# Patient Record
Sex: FEMALE | Race: WHITE | NOT HISPANIC OR LATINO | Employment: FULL TIME | URBAN - METROPOLITAN AREA
[De-identification: names, ages, dates, MRNs, and addresses within clinical notes are randomized per-mention and may not be internally consistent; named-entity substitution may affect disease eponyms.]

---

## 2017-02-01 ENCOUNTER — ALLSCRIPTS OFFICE VISIT (OUTPATIENT)
Dept: OTHER | Facility: OTHER | Age: 47
End: 2017-02-01

## 2017-02-01 DIAGNOSIS — Z12.31 ENCOUNTER FOR SCREENING MAMMOGRAM FOR MALIGNANT NEOPLASM OF BREAST: ICD-10-CM

## 2017-03-06 DIAGNOSIS — Z13.1 ENCOUNTER FOR SCREENING FOR DIABETES MELLITUS: ICD-10-CM

## 2017-03-06 DIAGNOSIS — Z13.0 ENCOUNTER FOR SCREENING FOR DISEASES OF THE BLOOD AND BLOOD-FORMING ORGANS AND CERTAIN DISORDERS INVOLVING THE IMMUNE MECHANISM: ICD-10-CM

## 2017-03-06 DIAGNOSIS — Z13.29 ENCOUNTER FOR SCREENING FOR OTHER SUSPECTED ENDOCRINE DISORDER: ICD-10-CM

## 2017-03-06 DIAGNOSIS — Z13.220 ENCOUNTER FOR SCREENING FOR LIPOID DISORDERS: ICD-10-CM

## 2017-04-24 ENCOUNTER — ALLSCRIPTS OFFICE VISIT (OUTPATIENT)
Dept: OTHER | Facility: OTHER | Age: 47
End: 2017-04-24

## 2017-05-03 ENCOUNTER — TRANSCRIBE ORDERS (OUTPATIENT)
Dept: URGENT CARE | Facility: MEDICAL CENTER | Age: 47
End: 2017-05-03

## 2017-05-03 ENCOUNTER — OFFICE VISIT (OUTPATIENT)
Dept: URGENT CARE | Facility: MEDICAL CENTER | Age: 47
End: 2017-05-03
Payer: COMMERCIAL

## 2017-05-03 ENCOUNTER — APPOINTMENT (OUTPATIENT)
Dept: LAB | Facility: MEDICAL CENTER | Age: 47
End: 2017-05-03
Attending: FAMILY MEDICINE
Payer: COMMERCIAL

## 2017-05-03 DIAGNOSIS — Z02.1 PRE-EMPLOYMENT HEALTH SCREENING EXAMINATION: ICD-10-CM

## 2017-05-03 DIAGNOSIS — Z02.1 PRE-EMPLOYMENT HEALTH SCREENING EXAMINATION: Primary | ICD-10-CM

## 2017-05-03 PROCEDURE — 99213 OFFICE O/P EST LOW 20 MIN: CPT

## 2017-05-18 ENCOUNTER — ALLSCRIPTS OFFICE VISIT (OUTPATIENT)
Dept: OTHER | Facility: OTHER | Age: 47
End: 2017-05-18

## 2017-06-07 ENCOUNTER — HOSPITAL ENCOUNTER (OUTPATIENT)
Dept: MAMMOGRAPHY | Facility: CLINIC | Age: 47
Discharge: HOME | End: 2017-06-07
Payer: COMMERCIAL

## 2017-06-07 DIAGNOSIS — Z12.31 ENCOUNTER FOR SCREENING MAMMOGRAM FOR MALIGNANT NEOPLASM OF BREAST: ICD-10-CM

## 2017-06-07 PROCEDURE — G0202 SCR MAMMO BI INCL CAD: HCPCS

## 2017-06-07 PROCEDURE — 77063 BREAST TOMOSYNTHESIS BI: CPT

## 2017-06-13 ENCOUNTER — GENERIC CONVERSION - ENCOUNTER (OUTPATIENT)
Dept: OTHER | Facility: OTHER | Age: 47
End: 2017-06-13

## 2017-10-28 ENCOUNTER — GENERIC CONVERSION - ENCOUNTER (OUTPATIENT)
Dept: OTHER | Facility: OTHER | Age: 47
End: 2017-10-28

## 2017-12-26 ENCOUNTER — OFFICE VISIT (OUTPATIENT)
Dept: URGENT CARE | Facility: CLINIC | Age: 47
End: 2017-12-26
Payer: COMMERCIAL

## 2017-12-26 PROCEDURE — 99213 OFFICE O/P EST LOW 20 MIN: CPT

## 2017-12-27 NOTE — PROGRESS NOTES
Assessment   1  Sinusitis (473 9) (J32 9)    Plan   Sinusitis    · Fluticasone Propionate 50 MCG/ACT Nasal Suspension; 1 spray in ea nostril bid   · Zithromax Z-Claudy 250 MG Oral Tablet (Azithromycin); TAKE AS DIRECTED    Discussion/Summary   Discussion Summary: This appears to be a sinus infection  Inc fluids and use the spray as written  Cosider a decongestant antihistamine combination  the antibiotic if you worsen  Chief Complaint   1  Cough  Chief Complaint Free Text Note Form: Pt c/o cough, headache and sinus pressure since Friday  History of Present Illness   HPI: Pt presents with a five day history of inc sinus pressure and HA  No fever  Hospital Based Practices Required Assessment:      Pain Assessment      the patient states they have pain  (on a scale of 0 to 10, the patient rates the pain at 7 )      Abuse And Domestic Violence Screen       Yes, the patient is safe at home  Depression And Suicide Screen  No, the patient has not had thoughts of hurting themself  No, the patient has not felt depressed in the past 7 days  Prefered Language is  [place]  Primary Language is  English  Review of Systems   Focused-Female:      Constitutional: No fever, no chills, feels well, no tiredness, no recent weight gain or loss  ENT: no ear ache, no loss of hearing, no nosebleeds or nasal discharge, no sore throat or hoarseness  Respiratory: no complaints of shortness of breath, no wheezing, no dyspnea on exertion, no orthopnea or PND  Musculoskeletal: no complaints of arthralgia, no myalgia, no joint swelling or stiffness, no limb pain or swelling  Integumentary: no complaints of skin rash or lesion, no itching or dry skin, no skin wounds  Active Problems   1  BPPV (benign paroxysmal positional vertigo) (386 11) (H81 10)   2  Dietary calcium deficiency (269 3) (E58)   3   Encounter for gynecological examination without abnormal finding (V72 31) (Z01 419) 4  Flu vaccine need (V04 81) (Z23)   5  Hyperlipidemia (272 4) (E78 5)   6  Hypothyroidism (244 9) (E03 9)   7  Inadequate exercise (V69 0) (Z72 3)   8  Perimenopause (627 2) (N95 1)   9  Visit for screening mammogram (V76 12) (Z12 31)    Past Medical History   1  Denied: History of abnormal cervical Pap smear   2  History of Graves' disease (V12 29) (Z86 39)   3  Denied: History of herpes simplex infection   4  History of pregnancy (V13 29)   5  Denied: History of sexually transmitted disease   6  History of Impacted cerumen of right ear (380 4) (H61 21)   7  History of Migraine without aura and responsive to treatment (346 10) (G43 009)   8  History of Postablative hypothyroidism (244 1) (E89 0)   9  History of Screening for deficiency anemia (V78 1) (Z13 0)   10  History of Screening for diabetes mellitus (DM) (V77 1) (Z13 1)   11  History of Screening for lipid disorders (V77 91) (Z13 220)   12  History of Screening for thyroid disorder (V77 0) (Z13 29)   13  History of Varicella without complication (537 8) (K03 5)    Family History   Mother    1  Family history of depression (V17 0) (Z81 8)   2  Family history of migraine (V17 2) (Z82 0)   3  Family history of osteopenia (V17 89) (Z82 69)  Father    4  Family history of aortic aneurysm (V17 49) (Z82 49)   5  Family history of hypercholesterolemia (V18 19) (Z83 42)   6  Family history of hypertension (V17 49) (Z82 49)   7  Family history of ischemic heart disease (V17 3) (Z82 49)   8  Family history of thyroid disorder (V18 19) (Z83 49)  Son    5  Family history of anxiety disorder (V17 0) (Z81 8)   10  Family history of attention deficit hyperactivity disorder (ADHD) (V17 0) (Z81 8)  Brother    6  Family history of anxiety disorder (V17 0) (Z81 8)   12  Family history of depression (V17 0) (Z81 8)  Maternal Grandmother    13  Family history of stroke (V17 1) (Z82 3)   14  Family history of type 2 diabetes mellitus (V18 0) (Z83 3)  Paternal Grandmother    13  Family history of osteoporosis (V17 81) (Z82 62)  Maternal Grandfather    12  Family history of skin cancer (V16 8) (Z80 8)  Uncle    17  Family history of skin cancer (V16 8) (Z80 8)  Maternal Uncle    25  Family history of lung cancer (V16 1) (Z80 1)  Paternal Uncle    23  Family history of thyroid disorder (V18 19) (Z83 49)  Cousin    20  Family history of thyroid disorder (V18 19) (Z83 49)    Social History    · Alcohol use (V49 89) (Z78 9)   · Denied: History of drug use   · Inadequate exercise (V69 0) (Z72 3)   · Advent   ·    · Never a smoker   · Occupation    Surgical History   1  History of Neuroplasty Decompression Median Nerve At Carpal Tunnel   2  History of Oral Surgery Tooth Extraction   3  History of Uterine Myomectomy Vaginal Approach   4  History of Vaginal Hysterectomy    Current Meds    1  Daily Multi Oral Tablet; Therapy: (Recorded:28Nye1654) to Recorded   2  Levothyroxine Sodium 88 MCG Oral Tablet; TAKE 1 TABLET DAILY; Therapy: 24Apr2017 to (Evaluate:21Jan2018)  Requested for: 56Vrb9102; Last     Rx:[id number] Ordered    Allergies   1  No Known Drug Allergies    Vitals   Signs   Recorded: 23MCF9474 10:14AM   Temperature: 97 8 F  Heart Rate: 80  Respiration: 16  Systolic: 434  Diastolic: 73  Height: 5 ft 5 in  Weight: 153 lb   BMI Calculated: 25 46  BSA Calculated: 1 77  O2 Saturation: 99  Pain Scale: 7    Physical Exam        Constitutional      General appearance: No acute distress, well appearing and well nourished  Eyes      Conjunctiva and lids: No swelling, erythema or discharge  Ears, Nose, Mouth, and Throat      External inspection of ears and nose: Normal        Oropharynx: Normal with no erythema, edema, exudate or lesions  Pulmonary      Respiratory effort: No increased work of breathing or signs of respiratory distress  Auscultation of lungs: Clear to auscultation         Cardiovascular      Auscultation of heart: Normal rate and rhythm, normal S1 and S2, without murmurs  Musculoskeletal      Gait and station: Normal        Digits and nails: Normal without clubbing or cyanosis  Inspection/palpation of joints, bones, and muscles: Normal        Skin      Skin and subcutaneous tissue: Normal without rashes or lesions  Psychiatric      Orientation to person, place, and time: Normal        Mood and affect: Normal        Additional Exam:  There is vague frontal sinus tenderness        Signatures    Electronically signed by : Marbin Mendoza MD; Dec 26 2017  6:05PM EST                       (Author)

## 2018-01-12 VITALS
DIASTOLIC BLOOD PRESSURE: 84 MMHG | SYSTOLIC BLOOD PRESSURE: 120 MMHG | HEIGHT: 65 IN | BODY MASS INDEX: 26.33 KG/M2 | OXYGEN SATURATION: 98 % | WEIGHT: 158 LBS | HEART RATE: 56 BPM | TEMPERATURE: 98.4 F

## 2018-01-13 VITALS
WEIGHT: 157 LBS | DIASTOLIC BLOOD PRESSURE: 78 MMHG | BODY MASS INDEX: 26.16 KG/M2 | SYSTOLIC BLOOD PRESSURE: 120 MMHG | HEIGHT: 65 IN

## 2018-01-13 VITALS
RESPIRATION RATE: 17 BRPM | OXYGEN SATURATION: 98 % | TEMPERATURE: 99.7 F | SYSTOLIC BLOOD PRESSURE: 110 MMHG | HEART RATE: 78 BPM | BODY MASS INDEX: 26.49 KG/M2 | HEIGHT: 65 IN | WEIGHT: 159 LBS | DIASTOLIC BLOOD PRESSURE: 80 MMHG

## 2018-01-15 NOTE — RESULT NOTES
Verified Results  MAMMO SCREENING BILATERAL W 3D & CAD 2017 05:52PM Edward Flannery Order Number: [id number]   Performing Comments: 49 yo  needs screening   - Patient Instructions: To schedule this appointment, please contact Central Scheduling at [phone]  Do not wear any perfume, powder, lotion or deodorant on breast or underarm area  Please bring your doctors order, referral (if needed) and insurance information with you on the day of the test  Failure to bring this information may result in this test being rescheduled  Arrive 15 minutes prior to your appointment time to register  On the day of your test, please bring any prior mammogram or breast studies with you that were not performed at a [facility]  Failure to bring prior exams may result in your test needing to be rescheduled  Test Name Result Flag Reference   MAMMO SCREENING BILATERAL W 3D & CAD (Report)     Patient History:   Patient had first child at age 43  No known family history of cancer  Patient has never smoked  Patient's BMI is 25 8  Reason for exam: screening, asymptomatic  Mammo Screening Bilateral W DBT and CAD: 2017 - Check In    #: [de-identified]   2D/3D Procedure   3D views: Bilateral MLO view(s) were taken  2D views: Bilateral CC view(s) were taken  Technologist: JACE Moon (R)(M)   Prior study comparison: 2015, bilateral 3D yanni    mammo, performed at [facility]  2014,    bilateral 3D yanni mammo, performed at [facility]  2013, mammo diagnostic right W CAD, performed at [facility]  2013, mammo screening bilateral W CAD,   performed at [facility]  2011, mammo    screening bilateral W CAD, performed at [facility]  2009, mammo screening bilateral W CAD, performed at    [facility]       The breast tissue is heterogeneously dense, potentially limiting    the sensitivity of mammography  Patient risk, included in this    report, assists in determining the appropriate screening regimen    (such as 3-D mammography or the inclusion of automated breast    ultrasound or MRI)  3-D mammography may also remain indicated as    screening  A combination of mediolateral oblique 3-D tomographic slices as    well as standard two-dimensional orthogonal images were obtained  Due to compatibility and/or proprietary issues between vendors of   the outside images, the prior 3-D exam may not be viewable in    its entirety for comparison  ï¾      The parenchymal pattern appears stable  No dominant soft tissue    mass or suspicious calcifications are noted  The skin and nipple   contours are within normal limits  No mammographic evidence of malignancy  No    significant changes when compared with prior studies  ACR BI-RADSï¾® Assessments: BiRad:1 - Negative     Recommendation:   Routine screening mammogram in 1 year  A reminder letter will be   scheduled  8-10% of cancers will be missed on mammography  Management of a    palpable abnormality must be based on clinical grounds  Patients    will be notified of their results via letter from our facility  Accredited by [employer] of Radiology and FDA       Transcription Location: [facility]: [id number]     Risk Value(s):   Tyrer-Cuzick 10 Year: 3 300%, Tyrer-Cuzick Lifetime: 15 400%,    Myriad Table: 1 5%, ROOPA 5 Year: 1 3%, NCI Lifetime: 12 5%   Signed by:   Zeke Briscoe MD   6/13/17

## 2018-01-22 VITALS — TEMPERATURE: 98.8 F

## 2018-01-23 VITALS
HEART RATE: 80 BPM | WEIGHT: 153 LBS | SYSTOLIC BLOOD PRESSURE: 123 MMHG | DIASTOLIC BLOOD PRESSURE: 73 MMHG | HEIGHT: 65 IN | OXYGEN SATURATION: 99 % | BODY MASS INDEX: 25.49 KG/M2 | TEMPERATURE: 97.8 F | RESPIRATION RATE: 16 BRPM

## 2018-05-08 DIAGNOSIS — E03.9 HYPOTHYROIDISM, UNSPECIFIED TYPE: Primary | ICD-10-CM

## 2018-05-08 RX ORDER — LEVOTHYROXINE SODIUM 88 UG/1
1 TABLET ORAL DAILY
COMMUNITY
Start: 2017-04-24 | End: 2018-05-08 | Stop reason: SDUPTHER

## 2018-05-08 RX ORDER — FLUTICASONE PROPIONATE 50 MCG
1 SPRAY, SUSPENSION (ML) NASAL 2 TIMES DAILY
COMMUNITY
Start: 2017-12-26 | End: 2020-01-23 | Stop reason: ALTCHOICE

## 2018-05-08 RX ORDER — AZITHROMYCIN 250 MG/1
TABLET, FILM COATED ORAL
COMMUNITY
Start: 2017-12-26 | End: 2018-08-30 | Stop reason: ALTCHOICE

## 2018-05-08 RX ORDER — LEVOTHYROXINE SODIUM 88 UG/1
88 TABLET ORAL DAILY
Qty: 90 TABLET | Refills: 1 | Status: SHIPPED | OUTPATIENT
Start: 2018-05-08 | End: 2018-10-14 | Stop reason: SDUPTHER

## 2018-08-30 ENCOUNTER — OFFICE VISIT (OUTPATIENT)
Dept: FAMILY MEDICINE CLINIC | Facility: CLINIC | Age: 48
End: 2018-08-30
Payer: COMMERCIAL

## 2018-08-30 VITALS
WEIGHT: 154.9 LBS | DIASTOLIC BLOOD PRESSURE: 90 MMHG | HEART RATE: 83 BPM | RESPIRATION RATE: 16 BRPM | TEMPERATURE: 98.6 F | SYSTOLIC BLOOD PRESSURE: 130 MMHG | BODY MASS INDEX: 27.45 KG/M2 | HEIGHT: 63 IN | OXYGEN SATURATION: 99 %

## 2018-08-30 DIAGNOSIS — F41.9 ANXIETY: ICD-10-CM

## 2018-08-30 DIAGNOSIS — R53.83 FATIGUE, UNSPECIFIED TYPE: ICD-10-CM

## 2018-08-30 DIAGNOSIS — E78.2 MIXED HYPERLIPIDEMIA: ICD-10-CM

## 2018-08-30 DIAGNOSIS — E58 DIETARY CALCIUM DEFICIENCY: ICD-10-CM

## 2018-08-30 DIAGNOSIS — Z00.00 WELL ADULT EXAM: Primary | ICD-10-CM

## 2018-08-30 DIAGNOSIS — H81.10 BENIGN PAROXYSMAL POSITIONAL VERTIGO, UNSPECIFIED LATERALITY: ICD-10-CM

## 2018-08-30 DIAGNOSIS — E89.0 POSTABLATIVE HYPOTHYROIDISM: ICD-10-CM

## 2018-08-30 PROBLEM — E78.5 HYPERLIPIDEMIA: Status: ACTIVE | Noted: 2017-04-24

## 2018-08-30 PROBLEM — E03.9 HYPOTHYROIDISM: Status: ACTIVE | Noted: 2017-04-24

## 2018-08-30 PROBLEM — N95.1 PERIMENOPAUSE: Status: ACTIVE | Noted: 2017-04-24

## 2018-08-30 PROCEDURE — 99396 PREV VISIT EST AGE 40-64: CPT | Performed by: FAMILY MEDICINE

## 2018-08-30 NOTE — PROGRESS NOTES
Assessment/Plan:  Patient is seen for a well visit  Her diet is not as good as it should be  She does not exercise  I encouraged her to do so  She drinks about a half cup of caffeine a day  She asked about having physical therpay at [facility] for vertigo  Diagnoses and all orders for this visit:    Well adult exam    Mixed hyperlipidemia  -     TSH, 3rd generation with Free T4 reflex; Future  -     Comprehensive metabolic panel; Future  -     CBC and differential; Future  -     Lipid Panel with Direct LDL reflex; Future    Dietary calcium deficiency  -     Comprehensive metabolic panel; Future  -     CBC and differential; Future    Benign paroxysmal positional vertigo, unspecified laterality  -     Comprehensive metabolic panel; Future  -     CBC and differential; Future    Postablative hypothyroidism  -     TSH, 3rd generation with Free T4 reflex; Future  -     Comprehensive metabolic panel; Future  -     CBC and differential; Future    Fatigue, unspecified type  -     Vitamin D 25 hydroxy; Future    Anxiety  -     Vitamin D 25 hydroxy; Future  -     sertraline (ZOLOFT) 50 mg tablet; Start with one half tablet daily for two weeks, then take one tablet daily          Subjective:      Patient ID: Dania Rushing is a 50 y o  female  Patient is here for a well visit  She has had increased bouts of vertigo  She questions if this is related to anxiety - stress at work and stress at home  She works at [employer]  Her son is ADHD and ODD - age 6  The following portions of the patient's history were reviewed and updated as appropriate: allergies, current medications, past family history, past medical history, past social history, past surgical history and problem list     Review of Systems   Constitutional: Negative  Respiratory: Negative  Cardiovascular: Negative  Musculoskeletal: Positive for neck pain  Neurological: Positive for headaches  Vertigo  Objective:      /90 (BP Location: Left arm, Patient Position: Sitting, Cuff Size: Large)   Pulse 83   Temp 98 6 °F (37 °C) (Tympanic)   Resp 16   Ht 5' 3 39" (1 61 m)   Wt 70 3 kg (154 lb 14 4 oz)   LMP  (LMP Unknown)   SpO2 99%   Breastfeeding? No   BMI 27 11 kg/m²          Physical Exam   Constitutional: She is oriented to person, place, and time  She appears well-developed and well-nourished  HENT:   Head: Normocephalic  Right Ear: Tympanic membrane normal    Left Ear: Tympanic membrane normal    Nose: Nose normal    Mouth/Throat: Oropharynx is clear and moist and mucous membranes are normal    Eyes: Pupils are equal, round, and reactive to light  Neck: Normal range of motion  No thyromegaly present  Cardiovascular: Normal rate, regular rhythm, normal heart sounds and intact distal pulses  Pulmonary/Chest: Effort normal and breath sounds normal    Abdominal: Soft  Bowel sounds are normal  There is no tenderness  Musculoskeletal: Normal range of motion  She exhibits no edema  Lymphadenopathy:     She has no cervical adenopathy  Neurological: She is alert and oriented to person, place, and time  She has normal reflexes  Skin: Skin is warm and dry  Psychiatric: She has a normal mood and affect  Nursing note and vitals reviewed

## 2018-09-04 ENCOUNTER — OFFICE VISIT (OUTPATIENT)
Dept: URGENT CARE | Facility: CLINIC | Age: 48
End: 2018-09-04
Payer: COMMERCIAL

## 2018-09-04 ENCOUNTER — TELEPHONE (OUTPATIENT)
Dept: FAMILY MEDICINE CLINIC | Facility: CLINIC | Age: 48
End: 2018-09-04

## 2018-09-04 VITALS
OXYGEN SATURATION: 98 % | DIASTOLIC BLOOD PRESSURE: 65 MMHG | WEIGHT: 155.6 LBS | SYSTOLIC BLOOD PRESSURE: 135 MMHG | HEIGHT: 65 IN | RESPIRATION RATE: 16 BRPM | TEMPERATURE: 97 F | HEART RATE: 91 BPM | BODY MASS INDEX: 25.92 KG/M2

## 2018-09-04 DIAGNOSIS — R30.0 DYSURIA: Primary | ICD-10-CM

## 2018-09-04 DIAGNOSIS — N30.01 ACUTE CYSTITIS WITH HEMATURIA: ICD-10-CM

## 2018-09-04 LAB
SL AMB  POCT GLUCOSE, UA: NEGATIVE
SL AMB LEUKOCYTE ESTERASE,UA: ABNORMAL
SL AMB POCT BILIRUBIN,UA: NEGATIVE
SL AMB POCT BLOOD,UA: ABNORMAL
SL AMB POCT CLARITY,UA: ABNORMAL
SL AMB POCT COLOR,UA: YELLOW
SL AMB POCT KETONES,UA: NEGATIVE
SL AMB POCT NITRITE,UA: NEGATIVE
SL AMB POCT PH,UA: 6
SL AMB POCT SPECIFIC GRAVITY,UA: 1.03
SL AMB POCT URINE PROTEIN: 300
SL AMB POCT UROBILINOGEN: 0.2

## 2018-09-04 PROCEDURE — 87086 URINE CULTURE/COLONY COUNT: CPT | Performed by: FAMILY MEDICINE

## 2018-09-04 PROCEDURE — 87186 SC STD MICRODIL/AGAR DIL: CPT | Performed by: FAMILY MEDICINE

## 2018-09-04 PROCEDURE — G0382 LEV 3 HOSP TYPE B ED VISIT: HCPCS | Performed by: FAMILY MEDICINE

## 2018-09-04 PROCEDURE — S9083 URGENT CARE CENTER GLOBAL: HCPCS | Performed by: FAMILY MEDICINE

## 2018-09-04 RX ORDER — NITROFURANTOIN 25; 75 MG/1; MG/1
100 CAPSULE ORAL 2 TIMES DAILY
Qty: 10 CAPSULE | Refills: 0 | Status: SHIPPED | OUTPATIENT
Start: 2018-09-04 | End: 2018-09-20 | Stop reason: ALTCHOICE

## 2018-09-04 NOTE — PATIENT INSTRUCTIONS
We are treating this as a urinary tract infection  Complete a 5 day course of the antibiotic  Call here in 48 hours if you are not improving

## 2018-09-04 NOTE — PROGRESS NOTES
Assessment/Plan:      Diagnoses and all orders for this visit:    Dysuria  -     POCT urine dip auto non-scope  -     Urine culture    Acute cystitis with hematuria  -     nitrofurantoin (MACROBID) 100 mg capsule; Take 1 capsule (100 mg total) by mouth 2 (two) times a day          Subjective:     Patient ID: Keith Carrizales is a 50 y o  female  Patient presents with several days of dysuria and increased frequency of urination  Urinalysis is positive for leukocytes as well as blood  Review of Systems   Constitutional: Negative  HENT: Negative  Eyes: Negative  Respiratory: Negative  Genitourinary: Positive for dysuria and frequency  Objective:     Physical Exam   Constitutional: She appears well-developed and well-nourished  HENT:   Head: Normocephalic and atraumatic  Eyes: Conjunctivae are normal    Cardiovascular: Regular rhythm  Pulmonary/Chest: Effort normal    Psychiatric: She has a normal mood and affect

## 2018-09-04 NOTE — TELEPHONE ENCOUNTER
Pt has an appt with [facility] for PT  She is looking for a referral but I think she means an order  She has an appt on 9/21/18  Are you referring her?

## 2018-09-05 DIAGNOSIS — R42 VERTIGO: Primary | ICD-10-CM

## 2018-09-05 NOTE — TELEPHONE ENCOUNTER
Yes, she works at [employer] and asked if she could go to PT there regarding vertigo  I will put order in

## 2018-09-07 LAB
BACTERIA UR CULT: ABNORMAL
BACTERIA UR CULT: ABNORMAL

## 2018-09-10 LAB
25(OH)D3 SERPL-MCNC: 23 NG/ML (ref 30–100)
ALBUMIN SERPL-MCNC: 4.3 G/DL (ref 3.6–5.1)
ALBUMIN/GLOB SERPL: 1.4 (CALC) (ref 1–2.5)
ALP SERPL-CCNC: 99 U/L (ref 33–115)
ALT SERPL-CCNC: 21 U/L (ref 6–29)
AST SERPL-CCNC: 18 U/L (ref 10–35)
BASOPHILS # BLD AUTO: 78 CELLS/UL (ref 0–200)
BASOPHILS NFR BLD AUTO: 1.6 %
BILIRUB SERPL-MCNC: 0.6 MG/DL (ref 0.2–1.2)
BUN SERPL-MCNC: 15 MG/DL (ref 7–25)
BUN/CREAT SERPL: NORMAL (CALC) (ref 6–22)
CALCIUM SERPL-MCNC: 9.7 MG/DL (ref 8.6–10.2)
CHLORIDE SERPL-SCNC: 104 MMOL/L (ref 98–110)
CHOLEST SERPL-MCNC: 223 MG/DL
CHOLEST/HDLC SERPL: 2.5 (CALC)
CO2 SERPL-SCNC: 27 MMOL/L (ref 20–32)
CREAT SERPL-MCNC: 0.85 MG/DL (ref 0.5–1.1)
EOSINOPHIL # BLD AUTO: 181 CELLS/UL (ref 15–500)
EOSINOPHIL NFR BLD AUTO: 3.7 %
ERYTHROCYTE [DISTWIDTH] IN BLOOD BY AUTOMATED COUNT: 12.8 % (ref 11–15)
GLOBULIN SER CALC-MCNC: 3.1 G/DL (CALC) (ref 1.9–3.7)
GLUCOSE SERPL-MCNC: 93 MG/DL (ref 65–99)
HCT VFR BLD AUTO: 38.2 % (ref 35–45)
HDLC SERPL-MCNC: 90 MG/DL
HGB BLD-MCNC: 12.6 G/DL (ref 11.7–15.5)
LDLC SERPL CALC-MCNC: 118 MG/DL (CALC)
LYMPHOCYTES # BLD AUTO: 1250 CELLS/UL (ref 850–3900)
LYMPHOCYTES NFR BLD AUTO: 25.5 %
MCH RBC QN AUTO: 29.9 PG (ref 27–33)
MCHC RBC AUTO-ENTMCNC: 33 G/DL (ref 32–36)
MCV RBC AUTO: 90.7 FL (ref 80–100)
MONOCYTES # BLD AUTO: 451 CELLS/UL (ref 200–950)
MONOCYTES NFR BLD AUTO: 9.2 %
NEUTROPHILS # BLD AUTO: 2940 CELLS/UL (ref 1500–7800)
NEUTROPHILS NFR BLD AUTO: 60 %
NONHDLC SERPL-MCNC: 133 MG/DL (CALC)
PLATELET # BLD AUTO: 299 THOUSAND/UL (ref 140–400)
PMV BLD REES-ECKER: 10.8 FL (ref 7.5–12.5)
POTASSIUM SERPL-SCNC: 4.4 MMOL/L (ref 3.5–5.3)
PROT SERPL-MCNC: 7.4 G/DL (ref 6.1–8.1)
RBC # BLD AUTO: 4.21 MILLION/UL (ref 3.8–5.1)
SL AMB EGFR AFRICAN AMERICAN: 93 ML/MIN/1.73M2
SL AMB EGFR NON AFRICAN AMERICAN: 80 ML/MIN/1.73M2
SODIUM SERPL-SCNC: 138 MMOL/L (ref 135–146)
TRIGL SERPL-MCNC: 61 MG/DL
TSH SERPL-ACNC: 0.52 MIU/L
WBC # BLD AUTO: 4.9 THOUSAND/UL (ref 3.8–10.8)

## 2018-09-20 ENCOUNTER — ANNUAL EXAM (OUTPATIENT)
Dept: FAMILY MEDICINE CLINIC | Facility: CLINIC | Age: 48
End: 2018-09-20
Payer: COMMERCIAL

## 2018-09-20 VITALS
HEIGHT: 65 IN | TEMPERATURE: 98.6 F | RESPIRATION RATE: 18 BRPM | DIASTOLIC BLOOD PRESSURE: 84 MMHG | HEART RATE: 63 BPM | SYSTOLIC BLOOD PRESSURE: 120 MMHG | BODY MASS INDEX: 26.02 KG/M2 | OXYGEN SATURATION: 93 % | WEIGHT: 156.2 LBS

## 2018-09-20 DIAGNOSIS — F41.9 ANXIETY: ICD-10-CM

## 2018-09-20 DIAGNOSIS — Z01.419 WELL WOMAN EXAM WITH ROUTINE GYNECOLOGICAL EXAM: Primary | ICD-10-CM

## 2018-09-20 DIAGNOSIS — Z12.39 BREAST CANCER SCREENING: ICD-10-CM

## 2018-09-20 DIAGNOSIS — R92.2 DENSE BREAST TISSUE: ICD-10-CM

## 2018-09-20 DIAGNOSIS — E89.0 POSTABLATIVE HYPOTHYROIDISM: ICD-10-CM

## 2018-09-20 PROCEDURE — 99396 PREV VISIT EST AGE 40-64: CPT | Performed by: FAMILY MEDICINE

## 2018-09-20 NOTE — PROGRESS NOTES
Assessment/Plan:  Patient is a 50year old female sen for a well woman exam   She had a hysterectomy about 10 years ago, so she does not need a PAP smear  Gets flu shot at work  Discussed diet and exercise  Importance of Calcium and Vit D, especially since her Vit D level was low at 23  Jerryl Rm Also her cholesterol is mildly elevated at 223  She was given an order for a mammogram   Her anxiety is doing better with Sertraline  Diagnoses and all orders for this visit:    Well woman exam with routine gynecological exam    Breast cancer screening  -     Mammo screening bilateral w 3d & cad; Future    Dense breast tissue  -     Mammo screening bilateral w 3d & cad; Future    Postablative hypothyroidism    Anxiety          Subjective:   Chief Complaint   Patient presents with    Gynecologic Exam        Patient ID: Raghav Irizarry is a 50 y o  female  Patient is here for well woman exam  Also recheck on anxiety - feels like she is not as wound up  Patient had hysterectomy about 10 years ago  Does have ovaries  Starts physical therapy tomorrow  The following portions of the patient's history were reviewed and updated as appropriate: allergies, current medications, past family history, past medical history, past social history, past surgical history and problem list     Review of Systems   Constitutional: Negative for chills and fever  HENT: Negative for congestion and sore throat  Respiratory: Negative for chest tightness  Cardiovascular: Negative for chest pain and palpitations  Gastrointestinal: Negative for abdominal pain, constipation, diarrhea and nausea  Genitourinary: Negative for difficulty urinating and menstrual problem  Skin: Negative  Neurological: Negative for dizziness and headaches  Psychiatric/Behavioral: The patient is nervous/anxious (improved)            Objective:      /84 (BP Location: Left arm, Patient Position: Sitting, Cuff Size: Large)   Pulse 63   Temp 98 6 °F (37 °C) (Tympanic)   Resp 18   Ht 5' 5" (1 651 m)   Wt 70 9 kg (156 lb 3 2 oz)   LMP  (LMP Unknown)   SpO2 93%   Breastfeeding? No   BMI 25 99 kg/m²          Physical Exam   Constitutional: She is oriented to person, place, and time  She appears well-developed  No distress  HENT:   Head: Normocephalic  Right Ear: Tympanic membrane normal    Left Ear: Tympanic membrane normal    Mouth/Throat: Oropharynx is clear and moist and mucous membranes are normal    Neck: No thyromegaly present  Cardiovascular: Normal rate, regular rhythm and normal heart sounds  Pulmonary/Chest: Effort normal and breath sounds normal  Right breast exhibits no mass and no tenderness  Left breast exhibits no mass and no tenderness  Abdominal: Soft  Bowel sounds are normal  There is no tenderness  Genitourinary: Vagina normal  No vaginal discharge found  Musculoskeletal: She exhibits no edema  Lymphadenopathy:     She has no cervical adenopathy  Neurological: She is alert and oriented to person, place, and time  Skin: Skin is warm and dry  Psychiatric: She has a normal mood and affect  Nursing note and vitals reviewed

## 2018-09-26 ENCOUNTER — HOSPITAL ENCOUNTER (OUTPATIENT)
Dept: MAMMOGRAPHY | Facility: CLINIC | Age: 48
Discharge: HOME | End: 2018-09-26
Payer: COMMERCIAL

## 2018-09-26 DIAGNOSIS — Z12.39 BREAST CANCER SCREENING: ICD-10-CM

## 2018-09-26 DIAGNOSIS — R92.2 DENSE BREAST TISSUE: ICD-10-CM

## 2018-09-26 PROCEDURE — 77067 SCR MAMMO BI INCL CAD: CPT

## 2018-09-26 PROCEDURE — 77063 BREAST TOMOSYNTHESIS BI: CPT

## 2018-10-14 DIAGNOSIS — E03.9 HYPOTHYROIDISM, UNSPECIFIED TYPE: ICD-10-CM

## 2018-10-15 RX ORDER — LEVOTHYROXINE SODIUM 88 UG/1
TABLET ORAL
Qty: 90 TABLET | Refills: 1 | Status: SHIPPED | OUTPATIENT
Start: 2018-10-15 | End: 2019-04-01 | Stop reason: SDUPTHER

## 2018-10-30 DIAGNOSIS — F41.9 ANXIETY: ICD-10-CM

## 2019-01-24 ENCOUNTER — OFFICE VISIT (OUTPATIENT)
Dept: FAMILY MEDICINE CLINIC | Facility: CLINIC | Age: 49
End: 2019-01-24
Payer: COMMERCIAL

## 2019-01-24 VITALS
TEMPERATURE: 98.6 F | BODY MASS INDEX: 25.67 KG/M2 | SYSTOLIC BLOOD PRESSURE: 116 MMHG | OXYGEN SATURATION: 98 % | HEART RATE: 65 BPM | RESPIRATION RATE: 16 BRPM | DIASTOLIC BLOOD PRESSURE: 70 MMHG | HEIGHT: 65 IN | WEIGHT: 154.1 LBS

## 2019-01-24 DIAGNOSIS — E78.2 MIXED HYPERLIPIDEMIA: ICD-10-CM

## 2019-01-24 DIAGNOSIS — E89.0 POSTABLATIVE HYPOTHYROIDISM: Primary | ICD-10-CM

## 2019-01-24 PROCEDURE — 99213 OFFICE O/P EST LOW 20 MIN: CPT | Performed by: FAMILY MEDICINE

## 2019-01-24 PROCEDURE — 3008F BODY MASS INDEX DOCD: CPT | Performed by: FAMILY MEDICINE

## 2019-01-24 NOTE — PROGRESS NOTES
Assessment/Plan:  Patient is seen for hyperlipidemia and underactive thyroid  Diagnoses and all orders for this visit:    Postablative hypothyroidism  -     TSH, 3rd generation with Free T4 reflex; Future    Mixed hyperlipidemia          Subjective:   Chief Complaint   Patient presents with    patient present for 4 month follow up to chronic conditions        Patient ID: Randi Adame is a 46 y o  female  Patient is here for follow up on Sertraline and hypothyroidism  She does not have dizziness and headaches as she had in the past  No vertigo  The following portions of the patient's history were reviewed and updated as appropriate: allergies, current medications, past medical history, past social history, past surgical history and problem list     Review of Systems   Constitutional: Negative for chills and fever  HENT: Negative for congestion and sore throat  Respiratory: Negative for chest tightness  Cardiovascular: Negative for chest pain and palpitations  Gastrointestinal: Negative for abdominal pain, constipation, diarrhea and nausea  Genitourinary: Negative for difficulty urinating  Skin: Negative  Neurological: Negative for dizziness and headaches  Psychiatric/Behavioral: Negative  Objective:      /70 (BP Location: Left arm, Patient Position: Sitting, Cuff Size: Standard)   Pulse 65   Temp 98 6 °F (37 °C) (Tympanic)   Resp 16   Ht 5' 5" (1 651 m)   Wt 69 9 kg (154 lb 1 6 oz)   LMP  (LMP Unknown)   SpO2 98%   Breastfeeding? No   BMI 25 64 kg/m²          Physical Exam   Constitutional: She is oriented to person, place, and time  She appears well-developed  No distress  Neck: Carotid bruit is not present  No thyromegaly present  Cardiovascular: Normal rate, regular rhythm and normal heart sounds  Pulmonary/Chest: Effort normal and breath sounds normal    Musculoskeletal: She exhibits no edema     Lymphadenopathy:     She has no cervical adenopathy  Neurological: She is alert and oriented to person, place, and time  Skin: Skin is warm and dry  Psychiatric: She has a normal mood and affect  Nursing note and vitals reviewed

## 2019-04-01 DIAGNOSIS — E03.9 HYPOTHYROIDISM, UNSPECIFIED TYPE: ICD-10-CM

## 2019-04-01 DIAGNOSIS — F41.9 ANXIETY: ICD-10-CM

## 2019-04-01 RX ORDER — LEVOTHYROXINE SODIUM 88 UG/1
TABLET ORAL
Qty: 90 TABLET | Refills: 1 | Status: SHIPPED | OUTPATIENT
Start: 2019-04-01 | End: 2019-10-03 | Stop reason: SDUPTHER

## 2019-06-23 DIAGNOSIS — F41.9 ANXIETY: ICD-10-CM

## 2019-09-17 DIAGNOSIS — E78.2 MIXED HYPERLIPIDEMIA: Primary | ICD-10-CM

## 2019-09-17 DIAGNOSIS — Z13.0 SCREENING FOR DEFICIENCY ANEMIA: ICD-10-CM

## 2019-09-27 ENCOUNTER — APPOINTMENT (OUTPATIENT)
Dept: LAB | Facility: CLINIC | Age: 49
End: 2019-09-27
Payer: COMMERCIAL

## 2019-09-27 DIAGNOSIS — E89.0 POSTABLATIVE HYPOTHYROIDISM: ICD-10-CM

## 2019-09-27 LAB
ALBUMIN SERPL BCP-MCNC: 4.3 G/DL (ref 3.5–5)
ALP SERPL-CCNC: 104 U/L (ref 46–116)
ALT SERPL W P-5'-P-CCNC: 41 U/L (ref 12–78)
ANION GAP SERPL CALCULATED.3IONS-SCNC: 8 MMOL/L (ref 4–13)
AST SERPL W P-5'-P-CCNC: 26 U/L (ref 5–45)
BASOPHILS # BLD AUTO: 0.07 THOUSANDS/ΜL (ref 0–0.1)
BASOPHILS NFR BLD AUTO: 2 % (ref 0–1)
BILIRUB SERPL-MCNC: 0.56 MG/DL (ref 0.2–1)
BUN SERPL-MCNC: 16 MG/DL (ref 5–25)
CALCIUM SERPL-MCNC: 9.6 MG/DL (ref 8.3–10.1)
CHLORIDE SERPL-SCNC: 108 MMOL/L (ref 100–108)
CHOLEST SERPL-MCNC: 241 MG/DL (ref 50–200)
CO2 SERPL-SCNC: 24 MMOL/L (ref 21–32)
CREAT SERPL-MCNC: 0.89 MG/DL (ref 0.6–1.3)
EOSINOPHIL # BLD AUTO: 0.23 THOUSAND/ΜL (ref 0–0.61)
EOSINOPHIL NFR BLD AUTO: 5 % (ref 0–6)
ERYTHROCYTE [DISTWIDTH] IN BLOOD BY AUTOMATED COUNT: 13.2 % (ref 11.6–15.1)
GFR SERPL CREATININE-BSD FRML MDRD: 76 ML/MIN/1.73SQ M
GLUCOSE P FAST SERPL-MCNC: 84 MG/DL (ref 65–99)
HCT VFR BLD AUTO: 41.1 % (ref 34.8–46.1)
HDLC SERPL-MCNC: 77 MG/DL (ref 40–60)
HGB BLD-MCNC: 12.8 G/DL (ref 11.5–15.4)
IMM GRANULOCYTES # BLD AUTO: 0.05 THOUSAND/UL (ref 0–0.2)
IMM GRANULOCYTES NFR BLD AUTO: 1 % (ref 0–2)
LDLC SERPL CALC-MCNC: 142 MG/DL (ref 0–100)
LYMPHOCYTES # BLD AUTO: 1.14 THOUSANDS/ΜL (ref 0.6–4.47)
LYMPHOCYTES NFR BLD AUTO: 24 % (ref 14–44)
MCH RBC QN AUTO: 29.3 PG (ref 26.8–34.3)
MCHC RBC AUTO-ENTMCNC: 31.1 G/DL (ref 31.4–37.4)
MCV RBC AUTO: 94 FL (ref 82–98)
MONOCYTES # BLD AUTO: 0.4 THOUSAND/ΜL (ref 0.17–1.22)
MONOCYTES NFR BLD AUTO: 9 % (ref 4–12)
NEUTROPHILS # BLD AUTO: 2.81 THOUSANDS/ΜL (ref 1.85–7.62)
NEUTS SEG NFR BLD AUTO: 59 % (ref 43–75)
NRBC BLD AUTO-RTO: 0 /100 WBCS
PLATELET # BLD AUTO: 266 THOUSANDS/UL (ref 149–390)
PMV BLD AUTO: 10.2 FL (ref 8.9–12.7)
POTASSIUM SERPL-SCNC: 4.3 MMOL/L (ref 3.5–5.3)
PROT SERPL-MCNC: 8.1 G/DL (ref 6.4–8.2)
RBC # BLD AUTO: 4.37 MILLION/UL (ref 3.81–5.12)
SODIUM SERPL-SCNC: 140 MMOL/L (ref 136–145)
TRIGL SERPL-MCNC: 111 MG/DL
TSH SERPL DL<=0.05 MIU/L-ACNC: 2.38 UIU/ML (ref 0.36–3.74)
WBC # BLD AUTO: 4.7 THOUSAND/UL (ref 4.31–10.16)

## 2019-09-27 PROCEDURE — 36415 COLL VENOUS BLD VENIPUNCTURE: CPT

## 2019-09-27 PROCEDURE — 80061 LIPID PANEL: CPT

## 2019-09-27 PROCEDURE — 85025 COMPLETE CBC W/AUTO DIFF WBC: CPT

## 2019-09-27 PROCEDURE — 84443 ASSAY THYROID STIM HORMONE: CPT

## 2019-09-27 PROCEDURE — 80053 COMPREHEN METABOLIC PANEL: CPT

## 2019-10-03 ENCOUNTER — ANNUAL EXAM (OUTPATIENT)
Dept: FAMILY MEDICINE CLINIC | Facility: CLINIC | Age: 49
End: 2019-10-03
Payer: COMMERCIAL

## 2019-10-03 VITALS
BODY MASS INDEX: 26.26 KG/M2 | RESPIRATION RATE: 17 BRPM | SYSTOLIC BLOOD PRESSURE: 104 MMHG | HEART RATE: 72 BPM | TEMPERATURE: 98.2 F | HEIGHT: 66 IN | DIASTOLIC BLOOD PRESSURE: 68 MMHG | OXYGEN SATURATION: 99 % | WEIGHT: 163.4 LBS

## 2019-10-03 DIAGNOSIS — R53.83 FATIGUE, UNSPECIFIED TYPE: ICD-10-CM

## 2019-10-03 DIAGNOSIS — Z01.419 WELL WOMAN EXAM WITH ROUTINE GYNECOLOGICAL EXAM: Primary | ICD-10-CM

## 2019-10-03 DIAGNOSIS — Z12.11 COLON CANCER SCREENING: ICD-10-CM

## 2019-10-03 DIAGNOSIS — Z12.39 BREAST CANCER SCREENING: ICD-10-CM

## 2019-10-03 DIAGNOSIS — E03.9 HYPOTHYROIDISM, UNSPECIFIED TYPE: ICD-10-CM

## 2019-10-03 DIAGNOSIS — G25.5 MUSCLE, JERKY MOVEMENTS (UNCONTROLLED): ICD-10-CM

## 2019-10-03 PROCEDURE — 99396 PREV VISIT EST AGE 40-64: CPT | Performed by: FAMILY MEDICINE

## 2019-10-03 RX ORDER — LEVOTHYROXINE SODIUM 88 UG/1
88 TABLET ORAL DAILY
Qty: 90 TABLET | Refills: 1 | Status: SHIPPED | OUTPATIENT
Start: 2019-10-03 | End: 2020-04-06

## 2019-10-03 NOTE — PROGRESS NOTES
Assessment/Plan:  Patient is a 46 Year old female seen for a well woman exam  She had a hysterectomy, so we do not do a PAP smear  We discussed her fasting bloodwork  She is due for a screening colonoscopy, so I referred her to gastroenterology  She also has some concern regarding muscle twitches and her father  Having Parkinson's  I ordered some additional blood work and she may need to see neurology  No problem-specific Assessment & Plan notes found for this encounter  Diagnoses and all orders for this visit:    Well woman exam with routine gynecological exam    Hypothyroidism, unspecified type  -     levothyroxine 88 mcg tablet; Take 1 tablet (88 mcg total) by mouth daily    Fatigue, unspecified type  -     CK (with reflex to MB); Future  -     Vitamin B12; Future  -     Vitamin D 25 hydroxy; Future    Muscle, jerky movements (uncontrolled)  -     CK (with reflex to MB); Future  -     Vitamin B12; Future  -     Vitamin D 25 hydroxy; Future    Breast cancer screening  -     Mammo screening bilateral w 3d & cad; Future    Colon cancer screening  -     Ambulatory referral to Gastroenterology          Subjective:   Chief Complaint   Patient presents with    Gynecologic Exam     Pt presents for an annual PAP  Pt states for the past few months she has been feeling fatigue  Pt states she has some concerns about her muscles jumping for the past six months  Pt states it has been happening more often than usual and Dad a Hx of Parkinson's        Patient ID: Yajaira Calles is a 46 y o  female  Patient is here for well woman exam   Had hysterectomy about 10 years ago for fibroids    Wakes up tired,  Dad with Parkinson's diagnosis recently - 78years old      The following portions of the patient's history were reviewed and updated as appropriate: allergies, current medications, past family history, past medical history, past social history, past surgical history and problem list     Review of Systems Objective:      /68 (BP Location: Right arm, Patient Position: Sitting, Cuff Size: Adult)   Pulse 72   Temp 98 2 °F (36 8 °C) (Tympanic)   Resp 17   Ht 5' 5 55" (1 665 m)   Wt 74 1 kg (163 lb 6 4 oz)   LMP  (LMP Unknown)   SpO2 99%   BMI 26 74 kg/m²          Physical Exam   Constitutional: She is oriented to person, place, and time  She appears well-developed  No distress  HENT:   Head: Normocephalic  Right Ear: Tympanic membrane normal    Left Ear: Tympanic membrane normal    Mouth/Throat: Oropharynx is clear and moist and mucous membranes are normal    Neck: No thyromegaly present  Cardiovascular: Normal rate, regular rhythm and normal heart sounds  Pulmonary/Chest: Effort normal and breath sounds normal  Right breast exhibits no mass  Left breast exhibits no mass  Abdominal: Soft  Bowel sounds are normal  There is no tenderness  Genitourinary: Vagina normal  Cervix exhibits no discharge and no friability  Right adnexum displays no mass and no tenderness  Left adnexum displays no mass and no tenderness  No vaginal discharge found  Genitourinary Comments: Uterus absent  Musculoskeletal: She exhibits no edema  Lymphadenopathy:     She has no cervical adenopathy  She has no axillary adenopathy  Neurological: She is alert and oriented to person, place, and time  She displays normal reflexes  She exhibits normal muscle tone  Skin: Skin is warm and dry  Psychiatric: She has a normal mood and affect  Nursing note and vitals reviewed  BMI Counseling: Body mass index is 26 74 kg/m²  The BMI is above normal  Nutrition recommendations include moderation in carbohydrate intake, increasing intake of lean protein and reducing intake of saturated fat and trans fat  Exercise recommendations include moderate aerobic physical activity for 150 minutes/week

## 2019-12-23 ENCOUNTER — APPOINTMENT (OUTPATIENT)
Dept: LAB | Facility: CLINIC | Age: 49
End: 2019-12-23
Payer: COMMERCIAL

## 2019-12-23 DIAGNOSIS — R53.83 FATIGUE, UNSPECIFIED TYPE: ICD-10-CM

## 2019-12-23 DIAGNOSIS — G25.5 MUSCLE, JERKY MOVEMENTS (UNCONTROLLED): ICD-10-CM

## 2019-12-23 LAB
25(OH)D3 SERPL-MCNC: 27.9 NG/ML (ref 30–100)
CK MB SERPL-MCNC: 1.1 NG/ML (ref 0–5)
CK MB SERPL-MCNC: <1 % (ref 0–2.5)
CK SERPL-CCNC: 131 U/L (ref 26–192)
VIT B12 SERPL-MCNC: 916 PG/ML (ref 100–900)

## 2019-12-23 PROCEDURE — 36415 COLL VENOUS BLD VENIPUNCTURE: CPT

## 2019-12-23 PROCEDURE — 82306 VITAMIN D 25 HYDROXY: CPT

## 2019-12-23 PROCEDURE — 82607 VITAMIN B-12: CPT

## 2019-12-23 PROCEDURE — 82553 CREATINE MB FRACTION: CPT

## 2019-12-23 PROCEDURE — 82550 ASSAY OF CK (CPK): CPT

## 2020-01-19 DIAGNOSIS — F41.9 ANXIETY: ICD-10-CM

## 2020-01-29 ENCOUNTER — HOSPITAL ENCOUNTER (OUTPATIENT)
Dept: BONE DENSITY | Facility: CLINIC | Age: 50
Discharge: HOME | End: 2020-01-29
Payer: COMMERCIAL

## 2020-01-29 VITALS — HEIGHT: 65 IN | WEIGHT: 155 LBS | BODY MASS INDEX: 25.83 KG/M2

## 2020-01-29 DIAGNOSIS — Z12.39 BREAST CANCER SCREENING: ICD-10-CM

## 2020-01-29 PROCEDURE — 77063 BREAST TOMOSYNTHESIS BI: CPT

## 2020-01-29 PROCEDURE — 77067 SCR MAMMO BI INCL CAD: CPT

## 2020-02-05 ENCOUNTER — TELEPHONE (OUTPATIENT)
Dept: FAMILY MEDICINE CLINIC | Facility: CLINIC | Age: 50
End: 2020-02-05

## 2020-02-05 NOTE — TELEPHONE ENCOUNTER
----- Message from Jessica Jarvis PA-C sent at 2/5/2020 11:41 AM EST -----  Please call patient with results  Normal mammogram   Repeat 1 year    Thank you

## 2020-02-27 ENCOUNTER — OFFICE VISIT (OUTPATIENT)
Dept: FAMILY MEDICINE CLINIC | Facility: CLINIC | Age: 50
End: 2020-02-27
Payer: COMMERCIAL

## 2020-02-27 VITALS
SYSTOLIC BLOOD PRESSURE: 114 MMHG | TEMPERATURE: 97.6 F | BODY MASS INDEX: 28.59 KG/M2 | OXYGEN SATURATION: 98 % | WEIGHT: 171.6 LBS | HEIGHT: 65 IN | DIASTOLIC BLOOD PRESSURE: 70 MMHG | HEART RATE: 61 BPM

## 2020-02-27 DIAGNOSIS — G44.52 NEW DAILY PERSISTENT HEADACHE: ICD-10-CM

## 2020-02-27 DIAGNOSIS — F41.9 ANXIETY: ICD-10-CM

## 2020-02-27 DIAGNOSIS — R42 VERTIGO: ICD-10-CM

## 2020-02-27 DIAGNOSIS — E89.0 POSTABLATIVE HYPOTHYROIDISM: ICD-10-CM

## 2020-02-27 DIAGNOSIS — N95.1 PERIMENOPAUSE: ICD-10-CM

## 2020-02-27 DIAGNOSIS — R25.3 MUSCLE TWITCHING: Primary | ICD-10-CM

## 2020-02-27 DIAGNOSIS — E55.9 VITAMIN D DEFICIENCY: ICD-10-CM

## 2020-02-27 PROCEDURE — 1036F TOBACCO NON-USER: CPT | Performed by: FAMILY MEDICINE

## 2020-02-27 PROCEDURE — 99213 OFFICE O/P EST LOW 20 MIN: CPT | Performed by: FAMILY MEDICINE

## 2020-02-27 RX ORDER — MECLIZINE HYDROCHLORIDE 25 MG/1
25 TABLET ORAL EVERY 8 HOURS PRN
Qty: 100 TABLET | Refills: 0 | Status: SHIPPED | OUTPATIENT
Start: 2020-02-27

## 2020-02-27 RX ORDER — MULTIVIT-MIN/IRON/FOLIC ACID/K 18-600-40
1 CAPSULE ORAL DAILY
Start: 2020-02-27 | End: 2022-03-10 | Stop reason: ALTCHOICE

## 2020-02-28 NOTE — PROGRESS NOTES
Assessment/Plan:  Discussed fatigue - she does not feel that she is depressed since she is on Zoloft  She goes to sleep but wakes up several times a night  Feels that her attention is not as sharp  She does snore, but her  has not noticed any apnea  I did refer to neurology because of muscle twitching -patient is concerned because she has a family history of Parkinson's  Patient also is with new daily headache  Diagnoses and all orders for this visit:    Muscle twitching  -     Ambulatory referral to Neurology; Future    Postablative hypothyroidism    Anxiety    Perimenopause    Vitamin D deficiency  -     Cholecalciferol (VITAMIN D) 50 MCG (2000 UT) CAPS; Take 1 capsule (2,000 Units total) by mouth daily    Vertigo  -     meclizine (ANTIVERT) 25 mg tablet; Take 1 tablet (25 mg total) by mouth every 8 (eight) hours as needed for dizziness    New daily persistent headache  -     Ambulatory referral to Neurology; Future          Subjective:   Chief Complaint   Patient presents with    Follow-up     Review labs from 12/23/19  Patient ID: Meredith Walker is a 44 y o  female  Patient is here for follow up of muscle twitches  Also feling fatigue  Has been taking a lot of Excedrin - dull ache in head almost all the time - is taking a lot of Excedrin      The following portions of the patient's history were reviewed and updated as appropriate: allergies, current medications, past family history, past medical history, past social history, past surgical history and problem list     Review of Systems   Constitutional: Negative for chills and fever  HENT: Negative for congestion and sore throat  Respiratory: Negative for chest tightness  Cardiovascular: Negative for chest pain and palpitations  Gastrointestinal: Negative for abdominal pain, constipation, diarrhea and nausea  Genitourinary: Negative for difficulty urinating  Musculoskeletal:        Muscle twitches   Skin: Negative  Neurological: Positive for headaches  Negative for dizziness  Psychiatric/Behavioral: Negative  Objective:      /70 (BP Location: Left arm, Patient Position: Sitting, Cuff Size: Adult)   Pulse 61   Temp 97 6 °F (36 4 °C) (Tympanic)   Ht 5' 5" (1 651 m)   Wt 77 8 kg (171 lb 9 6 oz)   LMP  (LMP Unknown)   SpO2 98%   BMI 28 56 kg/m²          Physical Exam   Constitutional: She is oriented to person, place, and time  She appears well-developed  No distress  Neck: Carotid bruit is not present  No thyromegaly present  Cardiovascular: Normal rate, regular rhythm and normal heart sounds  Pulmonary/Chest: Effort normal and breath sounds normal    Musculoskeletal: She exhibits no edema  Lymphadenopathy:     She has no cervical adenopathy  Neurological: She is alert and oriented to person, place, and time  Skin: Skin is warm and dry  Psychiatric: She has a normal mood and affect  Nursing note and vitals reviewed

## 2020-04-05 DIAGNOSIS — E03.9 HYPOTHYROIDISM, UNSPECIFIED TYPE: ICD-10-CM

## 2020-04-05 DIAGNOSIS — F41.9 ANXIETY: ICD-10-CM

## 2020-04-06 RX ORDER — LEVOTHYROXINE SODIUM 88 UG/1
TABLET ORAL
Qty: 90 TABLET | Refills: 0 | Status: SHIPPED | OUTPATIENT
Start: 2020-04-06 | End: 2020-07-01

## 2020-06-08 ENCOUNTER — TELEPHONE (OUTPATIENT)
Dept: NEUROLOGY | Facility: CLINIC | Age: 50
End: 2020-06-08

## 2020-06-15 ENCOUNTER — CONSULT (OUTPATIENT)
Dept: NEUROLOGY | Facility: CLINIC | Age: 50
End: 2020-06-15
Payer: COMMERCIAL

## 2020-06-15 VITALS
TEMPERATURE: 98.7 F | HEIGHT: 65 IN | WEIGHT: 172.8 LBS | DIASTOLIC BLOOD PRESSURE: 73 MMHG | BODY MASS INDEX: 28.79 KG/M2 | SYSTOLIC BLOOD PRESSURE: 137 MMHG | RESPIRATION RATE: 18 BRPM | HEART RATE: 66 BPM

## 2020-06-15 DIAGNOSIS — R79.89 LOW VITAMIN D LEVEL: ICD-10-CM

## 2020-06-15 DIAGNOSIS — R51.9 PERSISTENT HEADACHES: ICD-10-CM

## 2020-06-15 DIAGNOSIS — R25.3 MUSCLE TWITCHING: Primary | ICD-10-CM

## 2020-06-15 PROCEDURE — 99244 OFF/OP CNSLTJ NEW/EST MOD 40: CPT | Performed by: PSYCHIATRY & NEUROLOGY

## 2020-06-15 PROCEDURE — 3008F BODY MASS INDEX DOCD: CPT | Performed by: PSYCHIATRY & NEUROLOGY

## 2020-06-15 RX ORDER — BACLOFEN 10 MG/1
TABLET ORAL
Qty: 90 TABLET | Refills: 1 | Status: SHIPPED | OUTPATIENT
Start: 2020-06-15 | End: 2020-08-03 | Stop reason: SDUPTHER

## 2020-06-30 ENCOUNTER — TELEPHONE (OUTPATIENT)
Dept: NEUROLOGY | Facility: CLINIC | Age: 50
End: 2020-06-30

## 2020-06-30 DIAGNOSIS — F41.9 ANXIETY: ICD-10-CM

## 2020-06-30 DIAGNOSIS — E03.9 HYPOTHYROIDISM, UNSPECIFIED TYPE: ICD-10-CM

## 2020-07-01 RX ORDER — LEVOTHYROXINE SODIUM 88 UG/1
TABLET ORAL
Qty: 90 TABLET | Refills: 0 | Status: SHIPPED | OUTPATIENT
Start: 2020-07-01 | End: 2020-10-04

## 2020-08-03 DIAGNOSIS — R51.9 PERSISTENT HEADACHES: ICD-10-CM

## 2020-08-03 RX ORDER — BACLOFEN 10 MG/1
TABLET ORAL
Qty: 20 TABLET | Refills: 0 | Status: SHIPPED | OUTPATIENT
Start: 2020-08-03 | End: 2020-10-22 | Stop reason: SDUPTHER

## 2020-08-03 RX ORDER — BACLOFEN 10 MG/1
TABLET ORAL
Qty: 180 TABLET | Refills: 1 | Status: SHIPPED | OUTPATIENT
Start: 2020-08-03 | End: 2021-01-18

## 2020-08-03 NOTE — TELEPHONE ENCOUNTER
Patient calling in to give update  She staets she is doing well with increase on baclofen 20mg daily  Headaches are minimal    Patient [facility]  I have pended both for your review   please sign off if agreeable

## 2020-08-13 ENCOUNTER — APPOINTMENT (OUTPATIENT)
Dept: LAB | Facility: CLINIC | Age: 50
End: 2020-08-13
Payer: COMMERCIAL

## 2020-08-13 DIAGNOSIS — R25.3 MUSCLE TWITCHING: ICD-10-CM

## 2020-08-13 DIAGNOSIS — R79.89 LOW VITAMIN D LEVEL: ICD-10-CM

## 2020-08-13 LAB
25(OH)D3 SERPL-MCNC: 29 NG/ML (ref 30–100)
ALBUMIN SERPL BCP-MCNC: 3.8 G/DL (ref 3.5–5)
ALP SERPL-CCNC: 105 U/L (ref 46–116)
ALT SERPL W P-5'-P-CCNC: 28 U/L (ref 12–78)
ANION GAP SERPL CALCULATED.3IONS-SCNC: 6 MMOL/L (ref 4–13)
AST SERPL W P-5'-P-CCNC: 20 U/L (ref 5–45)
BASOPHILS # BLD AUTO: 0.06 THOUSANDS/ΜL (ref 0–0.1)
BASOPHILS NFR BLD AUTO: 1 % (ref 0–1)
BILIRUB SERPL-MCNC: 0.55 MG/DL (ref 0.2–1)
BUN SERPL-MCNC: 16 MG/DL (ref 5–25)
CA-I BLD-SCNC: 1.23 MMOL/L (ref 1.12–1.32)
CALCIUM SERPL-MCNC: 9.1 MG/DL (ref 8.3–10.1)
CHLORIDE SERPL-SCNC: 110 MMOL/L (ref 100–108)
CK SERPL-CCNC: 91 U/L (ref 26–192)
CO2 SERPL-SCNC: 25 MMOL/L (ref 21–32)
CREAT SERPL-MCNC: 0.89 MG/DL (ref 0.6–1.3)
EOSINOPHIL # BLD AUTO: 0.21 THOUSAND/ΜL (ref 0–0.61)
EOSINOPHIL NFR BLD AUTO: 4 % (ref 0–6)
ERYTHROCYTE [DISTWIDTH] IN BLOOD BY AUTOMATED COUNT: 13.2 % (ref 11.6–15.1)
FOLATE SERPL-MCNC: >20 NG/ML (ref 3.1–17.5)
GFR SERPL CREATININE-BSD FRML MDRD: 75 ML/MIN/1.73SQ M
GLUCOSE P FAST SERPL-MCNC: 86 MG/DL (ref 65–99)
HCT VFR BLD AUTO: 41.5 % (ref 34.8–46.1)
HGB BLD-MCNC: 13.4 G/DL (ref 11.5–15.4)
IMM GRANULOCYTES # BLD AUTO: 0.02 THOUSAND/UL (ref 0–0.2)
IMM GRANULOCYTES NFR BLD AUTO: 0 % (ref 0–2)
LYMPHOCYTES # BLD AUTO: 1 THOUSANDS/ΜL (ref 0.6–4.47)
LYMPHOCYTES NFR BLD AUTO: 21 % (ref 14–44)
MAGNESIUM SERPL-MCNC: 2.5 MG/DL (ref 1.6–2.6)
MCH RBC QN AUTO: 30 PG (ref 26.8–34.3)
MCHC RBC AUTO-ENTMCNC: 32.3 G/DL (ref 31.4–37.4)
MCV RBC AUTO: 93 FL (ref 82–98)
MONOCYTES # BLD AUTO: 0.45 THOUSAND/ΜL (ref 0.17–1.22)
MONOCYTES NFR BLD AUTO: 9 % (ref 4–12)
NEUTROPHILS # BLD AUTO: 3.1 THOUSANDS/ΜL (ref 1.85–7.62)
NEUTS SEG NFR BLD AUTO: 65 % (ref 43–75)
NRBC BLD AUTO-RTO: 0 /100 WBCS
PLATELET # BLD AUTO: 266 THOUSANDS/UL (ref 149–390)
PMV BLD AUTO: 11.1 FL (ref 8.9–12.7)
POTASSIUM SERPL-SCNC: 4.3 MMOL/L (ref 3.5–5.3)
PROT SERPL-MCNC: 8 G/DL (ref 6.4–8.2)
RBC # BLD AUTO: 4.46 MILLION/UL (ref 3.81–5.12)
SODIUM SERPL-SCNC: 141 MMOL/L (ref 136–145)
T4 FREE SERPL-MCNC: 1.12 NG/DL (ref 0.76–1.46)
TSH SERPL DL<=0.05 MIU/L-ACNC: 4.35 UIU/ML (ref 0.36–3.74)
WBC # BLD AUTO: 4.84 THOUSAND/UL (ref 4.31–10.16)

## 2020-08-13 PROCEDURE — 36415 COLL VENOUS BLD VENIPUNCTURE: CPT

## 2020-08-13 PROCEDURE — 85025 COMPLETE CBC W/AUTO DIFF WBC: CPT

## 2020-08-13 PROCEDURE — 82746 ASSAY OF FOLIC ACID SERUM: CPT

## 2020-08-13 PROCEDURE — 82085 ASSAY OF ALDOLASE: CPT

## 2020-08-13 PROCEDURE — 82330 ASSAY OF CALCIUM: CPT

## 2020-08-13 PROCEDURE — 80053 COMPREHEN METABOLIC PANEL: CPT

## 2020-08-13 PROCEDURE — 82306 VITAMIN D 25 HYDROXY: CPT

## 2020-08-13 PROCEDURE — 82550 ASSAY OF CK (CPK): CPT

## 2020-08-13 PROCEDURE — 83735 ASSAY OF MAGNESIUM: CPT

## 2020-08-13 PROCEDURE — 84439 ASSAY OF FREE THYROXINE: CPT

## 2020-08-13 PROCEDURE — 84443 ASSAY THYROID STIM HORMONE: CPT

## 2020-08-14 LAB — ALDOLASE SERPL-CCNC: 2.9 U/L (ref 3.3–10.3)

## 2020-09-09 ENCOUNTER — TELEPHONE (OUTPATIENT)
Dept: NEUROLOGY | Facility: CLINIC | Age: 50
End: 2020-09-09

## 2020-09-15 NOTE — TELEPHONE ENCOUNTER
I have not prescribed the Baclofen in the past  It appears that it was started by Dr Janell Turner in June? Andrew Ano, can you find out who we send this to in neuro?

## 2020-09-16 NOTE — TELEPHONE ENCOUNTER
Tried calling patient about labs that were not covered in June 2020  I left a voicemail message for patient to return my call   I left my name and number

## 2020-09-16 NOTE — TELEPHONE ENCOUNTER
Good Afternoon,  You ordered blood work for patient back in June 2020, patient had it done on 08/13/2020 and the insurance company is denying these labs  This message was sent to Dr Fernandez [place] office in error since we did not order the blood work  The denial is scanned into the media tab  Thank you

## 2020-10-02 NOTE — TELEPHONE ENCOUNTER
Spoke with Rogelio Christiansen from the business office   The billing for the labs are taken care of

## 2020-10-04 DIAGNOSIS — F41.9 ANXIETY: ICD-10-CM

## 2020-10-04 DIAGNOSIS — E03.9 HYPOTHYROIDISM, UNSPECIFIED TYPE: ICD-10-CM

## 2020-10-04 RX ORDER — LEVOTHYROXINE SODIUM 88 UG/1
TABLET ORAL
Qty: 90 TABLET | Refills: 0 | Status: SHIPPED | OUTPATIENT
Start: 2020-10-04 | End: 2020-10-22 | Stop reason: SDUPTHER

## 2020-10-08 ENCOUNTER — TELEPHONE (OUTPATIENT)
Dept: NEUROLOGY | Facility: CLINIC | Age: 50
End: 2020-10-08

## 2020-10-14 ENCOUNTER — HOSPITAL ENCOUNTER (OUTPATIENT)
Dept: NEUROLOGY | Facility: CLINIC | Age: 50
Discharge: HOME | End: 2020-10-14
Payer: COMMERCIAL

## 2020-10-14 DIAGNOSIS — R25.3 MUSCLE TWITCHING: ICD-10-CM

## 2020-10-14 PROCEDURE — 95911 NRV CNDJ TEST 9-10 STUDIES: CPT | Performed by: PSYCHIATRY & NEUROLOGY

## 2020-10-14 PROCEDURE — 95886 MUSC TEST DONE W/N TEST COMP: CPT | Performed by: PSYCHIATRY & NEUROLOGY

## 2020-10-21 ENCOUNTER — OFFICE VISIT (OUTPATIENT)
Dept: NEUROLOGY | Facility: CLINIC | Age: 50
End: 2020-10-21
Payer: COMMERCIAL

## 2020-10-21 VITALS
SYSTOLIC BLOOD PRESSURE: 119 MMHG | TEMPERATURE: 98.2 F | HEIGHT: 65 IN | HEART RATE: 63 BPM | BODY MASS INDEX: 28.82 KG/M2 | DIASTOLIC BLOOD PRESSURE: 65 MMHG | WEIGHT: 173 LBS

## 2020-10-21 DIAGNOSIS — R25.3 MUSCLE TWITCHING: Primary | ICD-10-CM

## 2020-10-21 DIAGNOSIS — R51.9 PERSISTENT HEADACHES: ICD-10-CM

## 2020-10-21 PROCEDURE — 1036F TOBACCO NON-USER: CPT | Performed by: PHYSICIAN ASSISTANT

## 2020-10-21 PROCEDURE — 99214 OFFICE O/P EST MOD 30 MIN: CPT | Performed by: PHYSICIAN ASSISTANT

## 2020-10-22 ENCOUNTER — OFFICE VISIT (OUTPATIENT)
Dept: FAMILY MEDICINE CLINIC | Facility: CLINIC | Age: 50
End: 2020-10-22
Payer: COMMERCIAL

## 2020-10-22 VITALS
OXYGEN SATURATION: 98 % | WEIGHT: 173.6 LBS | DIASTOLIC BLOOD PRESSURE: 72 MMHG | BODY MASS INDEX: 28.92 KG/M2 | HEART RATE: 58 BPM | HEIGHT: 65 IN | TEMPERATURE: 98.2 F | SYSTOLIC BLOOD PRESSURE: 130 MMHG

## 2020-10-22 DIAGNOSIS — E55.9 VITAMIN D DEFICIENCY: ICD-10-CM

## 2020-10-22 DIAGNOSIS — Z12.11 SCREENING FOR COLON CANCER: ICD-10-CM

## 2020-10-22 DIAGNOSIS — E03.9 HYPOTHYROIDISM, UNSPECIFIED TYPE: Primary | ICD-10-CM

## 2020-10-22 DIAGNOSIS — E78.2 MIXED HYPERLIPIDEMIA: ICD-10-CM

## 2020-10-22 PROCEDURE — 99214 OFFICE O/P EST MOD 30 MIN: CPT | Performed by: FAMILY MEDICINE

## 2020-10-22 RX ORDER — ERGOCALCIFEROL (VITAMIN D2) 1250 MCG
50000 CAPSULE ORAL WEEKLY
Qty: 12 CAPSULE | Refills: 0 | Status: SHIPPED | OUTPATIENT
Start: 2020-10-22 | End: 2021-01-12

## 2020-10-22 RX ORDER — LEVOTHYROXINE SODIUM 0.1 MG/1
100 TABLET ORAL DAILY
Qty: 90 TABLET | Refills: 1 | Status: SHIPPED | OUTPATIENT
Start: 2020-10-22 | End: 2021-02-25

## 2021-01-10 DIAGNOSIS — E55.9 VITAMIN D DEFICIENCY: ICD-10-CM

## 2021-01-10 DIAGNOSIS — F41.9 ANXIETY: ICD-10-CM

## 2021-01-10 LAB
CHOLEST SERPL-MCNC: 229 MG/DL
CHOLEST/HDLC SERPL: 3.4 (CALC)
HDLC SERPL-MCNC: 68 MG/DL
LDLC SERPL CALC-MCNC: 142 MG/DL (CALC)
NONHDLC SERPL-MCNC: 161 MG/DL (CALC)
T4 FREE SERPL-MCNC: 1.6 NG/DL (ref 0.8–1.8)
TRIGL SERPL-MCNC: 89 MG/DL
TSH SERPL-ACNC: 0.13 MIU/L

## 2021-01-12 RX ORDER — ERGOCALCIFEROL 1.25 MG/1
CAPSULE ORAL
Qty: 12 CAPSULE | Refills: 3 | Status: SHIPPED | OUTPATIENT
Start: 2021-01-12 | End: 2021-12-30 | Stop reason: SDUPTHER

## 2021-01-18 DIAGNOSIS — R51.9 PERSISTENT HEADACHES: ICD-10-CM

## 2021-01-18 RX ORDER — BACLOFEN 10 MG/1
TABLET ORAL
Qty: 180 TABLET | Refills: 3 | Status: SHIPPED | OUTPATIENT
Start: 2021-01-18 | End: 2022-02-08 | Stop reason: SDUPTHER

## 2021-02-23 ENCOUNTER — TELEPHONE (OUTPATIENT)
Dept: NEUROLOGY | Facility: CLINIC | Age: 51
End: 2021-02-23

## 2021-02-23 NOTE — TELEPHONE ENCOUNTER
Patient returned call and Registration completed 2/24/2021 7:30AM Daniel Miss at [facility]  Covid-screening questions completed  Aware of all policies - mask, visitor and no show fee

## 2021-02-24 ENCOUNTER — OFFICE VISIT (OUTPATIENT)
Dept: NEUROLOGY | Facility: CLINIC | Age: 51
End: 2021-02-24
Payer: COMMERCIAL

## 2021-02-24 VITALS
BODY MASS INDEX: 29.36 KG/M2 | HEART RATE: 72 BPM | SYSTOLIC BLOOD PRESSURE: 137 MMHG | DIASTOLIC BLOOD PRESSURE: 67 MMHG | WEIGHT: 176.2 LBS | HEIGHT: 65 IN

## 2021-02-24 DIAGNOSIS — R25.3 MUSCLE TWITCHING: Primary | ICD-10-CM

## 2021-02-24 DIAGNOSIS — R42 VERTIGO: ICD-10-CM

## 2021-02-24 DIAGNOSIS — G44.209 TENSION HEADACHE: ICD-10-CM

## 2021-02-24 PROCEDURE — 99214 OFFICE O/P EST MOD 30 MIN: CPT | Performed by: PHYSICIAN ASSISTANT

## 2021-02-24 NOTE — ASSESSMENT & PLAN NOTE
Patient with history of BPPV  She reports some vertigo more recently that seems to be brought on by stress, and she experiences associated head pressure, imbalance  This is not always brought on by movement  She has never had brain MRI  Will order MRI brain with IACs to evaluate for central process  Also discussed she could be experiencing a vertiginous migraine  This does not occur often, will monitor

## 2021-02-24 NOTE — ASSESSMENT & PLAN NOTE
Patient with intermittent muscle twitching in all of the extremities  Reviewed workup  Labs essentially unremarkable, EMG was normal with no evidence to support a generalized neurogenic or myopathic process  No evidence of atrophy, fasciculations or weakness on exam      Again discussed this is likely benign fasciculation syndrome  Reassurance given that her neurologic workup is unremarkable for a more concerning neuromuscular disorder  She is currently taking magnesium along with baclofen due to her concurrent tension-type headaches  This regimen has not really helped her twitching  Discussed that treatment is purely symptomatic, and if the twitching is bothering her too much and interfering with daily activities we can consider further treatment, however this may cause adverse effects with the medications  Medications to consider would be gabapentin, carbamazepine  She would like to hold off on medication and continue to monitor  Encouraged the patient to stay hydrated, get proper rest, limit caffeine/stimulants

## 2021-02-24 NOTE — ASSESSMENT & PLAN NOTE
Responding very well to baclofen 10mg BID along with magnesium supplement  Will continue current regimen

## 2021-02-24 NOTE — PROGRESS NOTES
Patient ID: Tonie Fan is a 44 y o  female  Assessment/Plan:    Muscle twitching  Patient with intermittent muscle twitching in all of the extremities  Reviewed workup  Labs essentially unremarkable, EMG was normal with no evidence to support a generalized neurogenic or myopathic process  No evidence of atrophy, fasciculations or weakness on exam      Again discussed this is likely benign fasciculation syndrome  Reassurance given that her neurologic workup is unremarkable for a more concerning neuromuscular disorder  She is currently taking magnesium along with baclofen due to her concurrent tension-type headaches  This regimen has not really helped her twitching  Discussed that treatment is purely symptomatic, and if the twitching is bothering her too much and interfering with daily activities we can consider further treatment, however this may cause adverse effects with the medications  Medications to consider would be gabapentin, carbamazepine  She would like to hold off on medication and continue to monitor  Encouraged the patient to stay hydrated, get proper rest, limit caffeine/stimulants  Tension headache  Responding very well to baclofen 10mg BID along with magnesium supplement  Will continue current regimen  Vertigo  Patient with history of BPPV  She reports some vertigo more recently that seems to be brought on by stress, and she experiences associated head pressure, imbalance  This is not always brought on by movement  She has never had brain MRI  Will order MRI brain with IACs to evaluate for central process  Also discussed she could be experiencing a vertiginous migraine  This does not occur often, will monitor  Patient to follow up in 4 months or sooner if needed  She was advised to call for any new or worsening symptoms       Diagnoses and all orders for this visit:    Muscle twitching    Tension headache    Vertigo  -     MRI brain IAC wo and w contrast; Future           Subjective:    HPI    Patient is a 44year old right handed female who presents today for neurologic follow up  She was last seen on 10/21/20  She was initially evaluated by Dr Diane Mooney on 6/15/2020 for several issues  Patient described that for the past 8-9 months she has been experiencing twitching  She describes a twitching which generally occurs proximally in any of the 4 extremities although occasionally will occur in the hands when she is working on the computer  She states that this is particularly apparent when she is resting  She has had no associated weakness  She has had no associated sensory symptoms  She describes no symptoms to suggest a Lhermitte's phenomenon  There has been no change in bowel or bladder function  In reviewing past studies she was found to be vitamin-D deficient  She was put on supplement  Has not noted any improvement in her agreed describe a twitching  Her father has Parkinson's disease  However, she is unaware of any family member with neuro muscular disease  She also described headaches  These date back a long time but have become problematic in terms of frequency over the past 8-9 months as well  These are described as generally right frontoparietal in location  She describes these predominantly as tightness, with rare modest pulsatile component  These have been occurring in the recent months on average of every other day and she does take Excedrin 2 tablets generally every other day, suggesting the possibility analgesic overuse effect as well  Labs completed 8/13/2020 with normal CK, slightly low aldolase, slightly elevated TSH at 4 25 with normal FT4 1 12, normal magnesium, low vit D (29), normal folate, normal ionized calcium, essentially unremarkable CBC and CMP    EMG of the LUE/LLE completed 10/14/2020 by Dr Britta Felix was a normal study with no evidence of a generalized neurogenic or myopathic process effecting the peripheral nervous system  Since last visit, she reports she is about the same  The twitching still bothers her and there was no change with the baclofen or magnesium  Her headaches have improved with the baclofen  She will take Excedrin when she gets one   she also reports some issues with vertigo  She has a history of BPPV, but more recently she has had some episodes of vertigo associated with head pressure that seems to be brought on by stress  Does not occur with head movement  She also has some imbalance associated  The following portions of the patient's history were reviewed and updated as appropriate: current medications, past family history, past medical history, past social history, past surgical history and problem list          Objective:    Blood pressure 137/67, pulse 72, height 5' 5" (1 651 m), weight 79 9 kg (176 lb 3 2 oz)    Physical Exam  Constitutional:       Appearance: Normal appearance  She is well-developed  HENT:      Head: Normocephalic and atraumatic  Eyes:      Extraocular Movements: EOM normal       Pupils: Pupils are equal, round, and reactive to light  Skin:     General: Skin is warm and dry  Neurological:      Mental Status: She is alert  Coordination: Coordination is intact  Deep Tendon Reflexes: Strength normal and reflexes are normal and symmetric  Psychiatric:         Mood and Affect: Mood normal          Speech: Speech normal          Behavior: Behavior normal          Neurological Exam  Mental Status  Alert  Oriented to person, place, time and situation  Speech is normal  Language is fluent with no aphasia  Attention and concentration are normal     Cranial Nerves  CN II: Visual fields full to confrontation  CN III, IV, VI: Extraocular movements intact bilaterally  Pupils equal round and reactive to light bilaterally  CN V: Facial sensation is normal   CN VII: Full and symmetric facial movement    CN VIII: Hearing is normal   CN IX, X: Palate elevates symmetrically  CN XI: Shoulder shrug strength is normal   CN XII: Tongue midline without atrophy or fasciculations  Motor   Normal muscle tone  Strength is 5/5 throughout all four extremities  Sensory  Light touch is normal in upper and lower extremities  Reflexes  Deep tendon reflexes are 2+ and symmetric in all four extremities with downgoing toes bilaterally  Coordination  Finger-to-nose, rapid alternating movements and heel-to-shin normal bilaterally without dysmetria  Gait  Casual gait is normal including stance, stride, and arm swing  ROS:    Review of Systems   Constitutional: Negative  Negative for appetite change and fever  HENT: Negative  Negative for hearing loss, tinnitus, trouble swallowing and voice change  Eyes: Negative  Negative for photophobia and pain  Respiratory: Negative  Negative for shortness of breath  Cardiovascular: Negative  Negative for palpitations  Gastrointestinal: Negative  Negative for nausea and vomiting  Endocrine: Negative  Negative for cold intolerance  Genitourinary: Negative  Negative for dysuria, frequency and urgency  Musculoskeletal: Negative  Negative for myalgias and neck pain  Skin: Negative  Negative for rash  Neurological: Positive for dizziness  Negative for tremors, seizures, syncope, facial asymmetry, speech difficulty, weakness, light-headedness, numbness and headaches  Muscle twitching    Hematological: Negative  Does not bruise/bleed easily  Psychiatric/Behavioral: Negative  Negative for confusion, hallucinations and sleep disturbance       I personally reviewed and updated the ROS as appropriate

## 2021-02-24 NOTE — PATIENT INSTRUCTIONS
Re-start magnesium 200-250mg daily and if tolerated, can increase to two per day   Would also make sure to take a calcium supplement with your vit D  Continue baclofen  Can consider other meds in the future to treat the benign fasciculation syndrome such as gabapentin and carbamazepine   MRI brain due to vertigo  Follow up in 4 months or sooner if needed

## 2021-02-25 ENCOUNTER — ANNUAL EXAM (OUTPATIENT)
Dept: FAMILY MEDICINE CLINIC | Facility: CLINIC | Age: 51
End: 2021-02-25
Payer: COMMERCIAL

## 2021-02-25 VITALS
DIASTOLIC BLOOD PRESSURE: 84 MMHG | WEIGHT: 177.2 LBS | RESPIRATION RATE: 18 BRPM | HEIGHT: 65 IN | TEMPERATURE: 97.4 F | HEART RATE: 72 BPM | OXYGEN SATURATION: 98 % | BODY MASS INDEX: 29.52 KG/M2 | SYSTOLIC BLOOD PRESSURE: 120 MMHG

## 2021-02-25 DIAGNOSIS — Z00.00 ANNUAL PHYSICAL EXAM: ICD-10-CM

## 2021-02-25 DIAGNOSIS — Z01.419 WELL WOMAN EXAM: Primary | ICD-10-CM

## 2021-02-25 DIAGNOSIS — E03.9 HYPOTHYROIDISM, UNSPECIFIED TYPE: ICD-10-CM

## 2021-02-25 DIAGNOSIS — Z12.31 BREAST CANCER SCREENING BY MAMMOGRAM: ICD-10-CM

## 2021-02-25 PROCEDURE — 3008F BODY MASS INDEX DOCD: CPT | Performed by: FAMILY MEDICINE

## 2021-02-25 PROCEDURE — 1036F TOBACCO NON-USER: CPT | Performed by: FAMILY MEDICINE

## 2021-02-25 PROCEDURE — 99396 PREV VISIT EST AGE 40-64: CPT | Performed by: FAMILY MEDICINE

## 2021-02-25 RX ORDER — LEVOTHYROXINE SODIUM 88 UG/1
88 TABLET ORAL DAILY
Qty: 90 TABLET | Refills: 1 | Status: SHIPPED | OUTPATIENT
Start: 2021-02-25 | End: 2021-08-02

## 2021-02-26 NOTE — PROGRESS NOTES
ADULT ANNUAL [address]  GROUP    NAME: Tish Chavez  AGE: 44 y o  SEX: female  : 1970     DATE: 2021     Assessment and Plan:   Patient is a 44year old female seen for well woman exam   A PAP smear was not done since patient had a hysterectomy about 12 years ago  She was given orders for fasting bloodwork  Problem List Items Addressed This Visit        Endocrine    Hypothyroidism    Relevant Medications    levothyroxine 88 mcg tablet    Other Relevant Orders    TSH, 3rd generation with Free T4 reflex      Other Visit Diagnoses     Well woman exam    -  Primary    Breast cancer screening by mammogram        Relevant Orders    Mammo screening bilateral w 3d & cad    BMI 29 0-29 9,adult        Annual physical exam              Immunizations and preventive care screenings were discussed with patient today  Appropriate education was printed on patient's after visit summary  Counseling:  Alcohol/drug use: discussed moderation in alcohol intake, the recommendations for healthy alcohol use, and avoidance of illicit drug use  Dental Health: discussed importance of regular tooth brushing, flossing, and dental visits  · Exercise: the importance of regular exercise/physical activity was discussed  Recommend exercise 3-5 times per week for at least 30 minutes  No follow-ups on file  Chief Complaint:     Chief Complaint   Patient presents with    Physical Exam    Gynecologic Exam      History of Present Illness:     Adult Annual Physical   Patient here for a comprehensive physical exam  The patient reports no problems  Patient is seen for well woman exam - hot flashes, urinary issues  Diet and Physical Activity  · Diet/Nutrition: well balanced diet  · Exercise: moderate cardiovascular exercise        Depression Screening  PHQ-9 Depression Screening    PHQ-9:   Frequency of the following problems over the past two weeks: Little interest or pleasure in doing things: 0 - not at all  Feeling down, depressed, or hopeless: 0 - not at all       [address]  · Sleep: sleeps well  · Hearing: normal - bilateral   · Vision: wears glasses  · Dental: regular dental visits  /GYN Health  · Patient is: postmenopausal  · Last menstrual period:hysterectomy - 12 years ago  Hysterectomy  Review of Systems:     Review of Systems   Constitutional: Negative for chills and fever  HENT: Negative for congestion and sore throat  Respiratory: Negative for chest tightness  Cardiovascular: Negative for chest pain and palpitations  Gastrointestinal: Negative for abdominal pain, constipation, diarrhea and nausea  Genitourinary: Negative for difficulty urinating  Skin: Negative  Neurological: Negative for dizziness and headaches  Psychiatric/Behavioral: Negative         Past Medical History:     Past Medical History:   Diagnosis Date    Graves disease     Herpes simplex infection     Migraine without aura and responsive to treatment     Postablative hypothyroidism     Varicella without complication       Past Surgical History:     Past Surgical History:   Procedure Laterality Date    HYSTERECTOMY      MYOMECTOMY      NEUROPLASTY / TRANSPOSITION MEDIAN NERVE AT CARPAL TUNNEL Right     TOOTH EXTRACTION        Social History:     E-Cigarette/Vaping    E-Cigarette Use Never User      E-Cigarette/Vaping Substances    Nicotine No     THC No     CBD No     Flavoring No     Other No     Unknown No      Social History     Socioeconomic History    Marital status: /Civil Union     Spouse name: Not on file    Number of children: Not on file    Years of education: Not on file    Highest education level: Not on file   Occupational History    Not on file   Social Needs    Financial resource strain: Not on file    Food insecurity     Worry: Not on file     Inability: Not on file   Jewel Toned needs Medical: Not on file     Non-medical: Not on file   Tobacco Use    Smoking status: Never Smoker    Smokeless tobacco: Never Used   Substance and Sexual Activity    Alcohol use: Yes     Frequency: Monthly or less     Drinks per session: 1 or 2     Binge frequency: Never    Drug use: No    Sexual activity: Not Currently   Lifestyle    Physical activity     Days per week: Not on file     Minutes per session: Not on file    Stress: Not on file   Relationships    Social connections     Talks on phone: Not on file     Gets together: Not on file     Attends [employer]ism service: Not on file     Active member of club or organization: Not on file     Attends meetings of clubs or organizations: Not on file     Relationship status: Not on file    Intimate partner violence     Fear of current or ex partner: Not on file     Emotionally abused: Not on file     Physically abused: Not on file     Forced sexual activity: Not on file   Other Topics Concern    Not on file   Social History Narrative    Not on file      Family History:     Family History   Problem Relation Age of Onset    Depression Mother     Migraines Mother     Osteopenia Mother     Aortic aneurysm Father     Hyperlipidemia Father     Hypertension Father     Heart disease Father         ischemic    Thyroid disease Father     Anxiety disorder Brother     Depression Brother     Anxiety disorder Son    Karli Carroll ADD / ADHD Son     Diabetes Maternal Grandmother     Skin cancer Maternal Grandfather     Stroke Maternal Grandfather     Osteoporosis Paternal Grandmother     Lung cancer Maternal Uncle     Thyroid disease Paternal Uncle     Thyroid disease Cousin       Current Medications:     Current Outpatient Medications   Medication Sig Dispense Refill    baclofen 10 mg tablet TAKE 2 TABLETS DAILY 180 tablet 3    Cholecalciferol (VITAMIN D) 50 MCG (2000 UT) CAPS Take 1 capsule (2,000 Units total) by mouth daily      ergocalciferol (VITAMIN D2) 50,000 units TAKE 1 CAPSULE ONCE A WEEK 12 capsule 3    levothyroxine 88 mcg tablet Take 1 tablet (88 mcg total) by mouth daily 90 tablet 1    meclizine (ANTIVERT) 25 mg tablet Take 1 tablet (25 mg total) by mouth every 8 (eight) hours as needed for dizziness 100 tablet 0    Multiple Vitamins-Minerals (DAILY MULTI PO) Take by mouth      sertraline (ZOLOFT) 50 mg tablet TAKE 1 TABLET DAILY 90 tablet 3     No current facility-administered medications for this visit  Allergies:     No Known Allergies   Physical Exam:     /84 (BP Location: Left arm, Patient Position: Sitting)   Pulse 72   Temp (!) 97 4 °F (36 3 °C) (Tympanic)   Resp 18   Ht 5' 5" (1 651 m)   Wt 80 4 kg (177 lb 3 2 oz)   LMP  (LMP Unknown)   SpO2 98%   BMI 29 49 kg/m²     Physical Exam  Vitals signs and nursing note reviewed  Constitutional:       General: She is not in acute distress  Appearance: She is well-developed  HENT:      Head: Normocephalic and atraumatic  Eyes:      Conjunctiva/sclera: Conjunctivae normal    Neck:      Musculoskeletal: Neck supple  Cardiovascular:      Rate and Rhythm: Normal rate and regular rhythm  Heart sounds: No murmur  Pulmonary:      Effort: Pulmonary effort is normal  No respiratory distress  Breath sounds: Normal breath sounds  Chest:      Breasts:         Right: No mass  Left: No mass  Abdominal:      Palpations: Abdomen is soft  Tenderness: There is no abdominal tenderness  Genitourinary:     Labia:         Right: No lesion  Left: No lesion  Vagina: No vaginal discharge or erythema  Adnexa:         Right: No tenderness  Left: No tenderness  Comments: Cervix absent  Lymphadenopathy:      Cervical: No cervical adenopathy  Upper Body:      Right upper body: No supraclavicular or axillary adenopathy  Left upper body: No supraclavicular or axillary adenopathy  Lower Body: No right inguinal adenopathy   No left inguinal adenopathy  Skin:     General: Skin is warm and dry  Neurological:      Mental Status: She is alert     Psychiatric:         Mood and Affect: Mood normal           Calvin Park, DO  [address]

## 2021-02-26 NOTE — PATIENT INSTRUCTIONS

## 2021-03-31 ENCOUNTER — HOSPITAL ENCOUNTER (OUTPATIENT)
Dept: MAMMOGRAPHY | Facility: CLINIC | Age: 51
Discharge: HOME | End: 2021-03-31
Payer: COMMERCIAL

## 2021-03-31 VITALS — WEIGHT: 177 LBS | BODY MASS INDEX: 29.49 KG/M2 | HEIGHT: 65 IN

## 2021-03-31 DIAGNOSIS — Z12.31 BREAST CANCER SCREENING BY MAMMOGRAM: ICD-10-CM

## 2021-03-31 PROCEDURE — 77063 BREAST TOMOSYNTHESIS BI: CPT

## 2021-03-31 PROCEDURE — 77067 SCR MAMMO BI INCL CAD: CPT

## 2021-04-12 ENCOUNTER — VBI (OUTPATIENT)
Dept: ADMINISTRATIVE | Facility: OTHER | Age: 51
End: 2021-04-12

## 2021-04-14 ENCOUNTER — HOSPITAL ENCOUNTER (OUTPATIENT)
Dept: MRI IMAGING | Facility: HOSPITAL | Age: 51
Discharge: HOME | End: 2021-04-14
Payer: COMMERCIAL

## 2021-04-14 DIAGNOSIS — R42 VERTIGO: ICD-10-CM

## 2021-04-14 PROCEDURE — G1004 CDSM NDSC: HCPCS

## 2021-04-14 PROCEDURE — 70553 MRI BRAIN STEM W/O & W/DYE: CPT

## 2021-04-14 PROCEDURE — A9585 GADOBUTROL INJECTION: HCPCS | Performed by: PHYSICIAN ASSISTANT

## 2021-04-14 RX ADMIN — GADOBUTROL 8 ML: 604.72 INJECTION INTRAVENOUS at 19:05

## 2021-04-15 ENCOUNTER — AMB VIDEO VISIT (OUTPATIENT)
Dept: URGENT CARE | Facility: CLINIC | Age: 51
End: 2021-04-15

## 2021-04-15 DIAGNOSIS — R30.0 DYSURIA: Primary | ICD-10-CM

## 2021-04-15 RX ORDER — NITROFURANTOIN 25; 75 MG/1; MG/1
100 CAPSULE ORAL 2 TIMES DAILY
Qty: 10 CAPSULE | Refills: 0 | Status: SHIPPED | OUTPATIENT
Start: 2021-04-15 | End: 2021-04-20

## 2021-04-15 NOTE — PROGRESS NOTES
TeleConsultation - Jenaro Morris 44 y o  female MRN: [id number]     Encounter: [id number]    REQUIRED DOCUMENTATION:     1  This service was provided via Telemedicine  2  Provider located at [facility]  3  TeleMed provider: Damian Bartlett PA-C   4  Identify all parties in room with patient during tele consult:  Patient only  5  After connecting through [email or web address], patient was identified by name and date of birth and confirmed patient was located in [place]  Patient was then informed that this was a Telemedicine visit and that the exam was being conducted confidentially over secure lines  My office door was closed  No one else was in the room  Patient acknowledged consent and understanding of privacy and security of the Telemedicine visit  Patient presented the opportunity for them to ask any questions regarding the visit today  The patient agreed to participate  [address]  (office) [phone]  (fax) [phone]        NAME: Jenaro Morris is a 44 y o  female  : 1970    MRN: [id number]  DATE: April 15, 2021  TIME: 12:37 PM    Assessment and Plan   Dysuria [R30 0]  1  Dysuria  nitrofurantoin (MACROBID) 100 mg capsule       Patient Instructions   Dysuria   AMBULATORY CARE:   Dysuria  is trouble urinating, or pain, burning, or discomfort when you urinate  Dysuria is usually a symptom of another problem, such as a blockage or urinary tract infection  Common symptoms include the following:   · Fever     · Cloudy, bad smelling urine     · Urge to urinate often but urinating little     · Back, side, or abdominal pain     · Blood in your urine     · Discharge that smells bad     · Itching  Seek care immediately if:   · You have severe back, side, or abdominal pain  · You have fever and shaking chills  · You vomit several times in a row    Contact your healthcare provider if:   · Your symptoms do not go away, even after treatment  · You have questions or concerns about your condition or care  Treatment for dysuria  may include medicines to treat a bacterial infection or help decrease bladder spasms  Manage your dysuria:   · Drink more liquids  Liquids help flush out bacteria that may be causing an infection  Ask your healthcare provider how much liquid to drink each day and which liquids are best for you  · Take sitz baths as directed  Fill a bathtub with 4 to 6 inches of warm water  You may also use a sitz bath pan that fits over a toilet  Sit in the sitz bath for 20 minutes  Do this 2 to 3 times a day, or as directed  The warm water can help decrease pain and swelling  Follow up with your healthcare provider as directed:  Write down your questions so you remember to ask them during your visits  © 2017 [address] [employer] Information is for End User's use only and may not be sold, redistributed or otherwise used for commercial purposes  All illustrations and images included in CareNotes® are the copyrighted property of A D A M , Inc  or [employer]  The above information is an  only  It is not intended as medical advice for individual conditions or treatments  Talk to your doctor, nurse or pharmacist before following any medical regimen to see if it is safe and effective for you  I have prescribed an antibiotic for the infection  Please take the antibiotic as prescribed and finish the entire prescription  I recommend that the patient takes an over the counter probiotic or eats yogurt with live cultures in it [place]) to keep good bacteria in the gut and help prevent diarrhea  If not improving over the next 3-5 days, follow up with PCP  To present to the ER if symptoms worsen  Chief Complaint   No chief complaint on file  History of Present Illness   Rip Fall presents to the video visit c/o    Urinary Frequency   This is a new problem   The current episode started in the past 7 days  The problem occurs every urination  The problem has been unchanged  The quality of the pain is described as burning  The pain is mild  There has been no fever  There is no history of pyelonephritis  Associated symptoms include frequency and urgency  Pertinent negatives include no chills, discharge, flank pain, hematuria, hesitancy, nausea, sweats or vomiting  She has tried increased fluids (azo) for the symptoms  The treatment provided no relief  There is no history of recurrent UTIs  Review of Systems   Review of Systems   Constitutional: Negative for chills, fatigue and fever  Respiratory: Negative for cough, chest tightness and shortness of breath  Cardiovascular: Negative for chest pain  Gastrointestinal: Negative for nausea and vomiting  Genitourinary: Positive for dysuria, frequency and urgency  Negative for flank pain, hematuria, hesitancy and pelvic pain           Current Medications     Long-Term Medications   Medication Sig Dispense Refill    baclofen 10 mg tablet TAKE 2 TABLETS DAILY 180 tablet 3    Cholecalciferol (VITAMIN D) 50 MCG (2000 UT) CAPS Take 1 capsule (2,000 Units total) by mouth daily      ergocalciferol (VITAMIN D2) 50,000 units TAKE 1 CAPSULE ONCE A WEEK 12 capsule 3    levothyroxine 88 mcg tablet Take 1 tablet (88 mcg total) by mouth daily 90 tablet 1    meclizine (ANTIVERT) 25 mg tablet Take 1 tablet (25 mg total) by mouth every 8 (eight) hours as needed for dizziness 100 tablet 0    sertraline (ZOLOFT) 50 mg tablet TAKE 1 TABLET DAILY 90 tablet 3       Current Allergies     Allergies as of 04/15/2021    (No Known Allergies)            The following portions of the patient's history were reviewed and updated as appropriate: allergies, current medications, past family history, past medical history, past social history, past surgical history and problem list   Past Medical History:   Diagnosis Date    Graves disease     Herpes simplex infection     Migraine without aura and responsive to treatment     Postablative hypothyroidism     Varicella without complication      Past Surgical History:   Procedure Laterality Date    HYSTERECTOMY      age 34   Jesus Franco MYOMECTOMY      NEUROPLASTY / TRANSPOSITION MEDIAN NERVE AT CARPAL TUNNEL Right     TOOTH EXTRACTION       Social History     Socioeconomic History    Marital status: /Civil Union     Spouse name: Not on file    Number of children: Not on file    Years of education: Not on file    Highest education level: Not on file   Occupational History    Not on file   Social Needs    Financial resource strain: Not on file    Food insecurity     Worry: Not on file     Inability: Not on file   [employer] needs     Medical: Not on file     Non-medical: Not on file   Tobacco Use    Smoking status: Never Smoker    Smokeless tobacco: Never Used   Substance and Sexual Activity    Alcohol use: Yes     Frequency: Monthly or less     Drinks per session: 1 or 2     Binge frequency: Never    Drug use: No    Sexual activity: Not Currently   Lifestyle    Physical activity     Days per week: Not on file     Minutes per session: Not on file    Stress: Not on file   Relationships    Social connections     Talks on phone: Not on file     Gets together: Not on file     Attends [employer] service: Not on file     Active member of club or organization: Not on file     Attends meetings of clubs or organizations: Not on file     Relationship status: Not on file    Intimate partner violence     Fear of current or ex partner: Not on file     Emotionally abused: Not on file     Physically abused: Not on file     Forced sexual activity: Not on file   Other Topics Concern    Not on file   Social History Narrative    Not on file       Objective   LMP  (LMP Unknown)    video visit- per patient no fevers  Physical Exam     Physical Exam  Constitutional:       General: She is not in acute distress  Appearance: Normal appearance  She is not toxic-appearing  HENT:      Right Ear: External ear normal       Left Ear: External ear normal    Eyes:      General:         Right eye: No discharge  Left eye: No discharge  Conjunctiva/sclera: Conjunctivae normal    Pulmonary:      Effort: Pulmonary effort is normal    Neurological:      Mental Status: She is alert  Psychiatric:         Mood and Affect: Mood normal          Thought Content:  Thought content normal          Judgment: Judgment normal        Video visit-VICKY Byrd PA-C

## 2021-04-15 NOTE — CARE ANYWHERE EVISITS
Visit Summary for [facility] - Gender: Female - Date of Birth: [id number]  Date: [id number] - Duration: 5 minutes  Patient: [facility]  Provider: Lester Tinajero PA-C    Patient Contact Information  Address  [address] [place] [address] Road; [address]  [phone]    Visit Topics  possible UTI [Added By: Self - 2021-04-15]    Triage Questions   What is your current physical address in the event of a medical emergency? Answer []  Are you allergic to any medications? Answer []  Are you now or could you be pregnant? Answer []  Do you have any immune system compromise or chronic lung   disease? Answer []  Do you have any vulnerable family members in the home (infant, pregnant, cancer, elderly)? Answer []     Conversation Transcripts  [0A][0A] [Notification] You are connected with Lester Tinajero PA-C, Urgent Care Specialist [0A][Notification] Rolando Tam is located in [place]  [0A][Notification] Rolando Tam has shared health history  Gudelia Brewster  [0A]    Diagnosis    Procedures  Value: [id number] Code: CPT-4 UNLISTED E&M SERVICE    Medications Prescribed    No prescriptions ordered    Electronically signed by: Adriana Chew(NPI [id number])

## 2021-04-26 ENCOUNTER — TELEPHONE (OUTPATIENT)
Dept: FAMILY MEDICINE CLINIC | Facility: CLINIC | Age: 51
End: 2021-04-26

## 2021-04-26 ENCOUNTER — TELEMEDICINE (OUTPATIENT)
Dept: FAMILY MEDICINE CLINIC | Facility: CLINIC | Age: 51
End: 2021-04-26
Payer: COMMERCIAL

## 2021-04-26 DIAGNOSIS — B34.9 VIRAL INFECTION, UNSPECIFIED: Primary | ICD-10-CM

## 2021-04-26 DIAGNOSIS — B34.9 VIRAL INFECTION, UNSPECIFIED: ICD-10-CM

## 2021-04-26 PROBLEM — R25.3 MUSCLE TWITCHING: Status: ACTIVE | Noted: 2019-12-01

## 2021-04-26 PROCEDURE — 99213 OFFICE O/P EST LOW 20 MIN: CPT | Performed by: FAMILY MEDICINE

## 2021-04-26 PROCEDURE — U0005 INFEC AGEN DETEC AMPLI PROBE: HCPCS | Performed by: FAMILY MEDICINE

## 2021-04-26 PROCEDURE — 1036F TOBACCO NON-USER: CPT | Performed by: FAMILY MEDICINE

## 2021-04-26 PROCEDURE — U0003 INFECTIOUS AGENT DETECTION BY NUCLEIC ACID (DNA OR RNA); SEVERE ACUTE RESPIRATORY SYNDROME CORONAVIRUS 2 (SARS-COV-2) (CORONAVIRUS DISEASE [COVID-19]), AMPLIFIED PROBE TECHNIQUE, MAKING USE OF HIGH THROUGHPUT TECHNOLOGIES AS DESCRIBED BY CMS-2020-01-R: HCPCS | Performed by: FAMILY MEDICINE

## 2021-04-26 NOTE — PROGRESS NOTES
COVID-19 Outpatient Progress Note    Assessment/Plan:    Problem List Items Addressed This Visit        Other    Viral infection, unspecified - Primary     Advised given current symptoms and timing of vaccine would recommend testing; advised on supportive care for symptoms; f/u guidance given should sx worsen; advised on quarantine until results and further instruction based on results         Relevant Orders    Novel Coronavirus (Covid-19),PCR [facility] - Collected at [place] [address] or [facility]         Disposition:     I referred patient to one of our centralized sites for a COVID-19 swab  I have spent 11 minutes directly with the patient  Greater than 50% of this time was spent in counseling/coordination of care regarding: instructions for management and impressions  Encounter provider Bethel Lovett DO    Provider located at [facility] 8  [address]  Juan Brand [facility]  [phone]    Recent Visits  No visits were found meeting these conditions  Showing recent visits within past 7 days and meeting all other requirements     Today's Visits  Date Type Provider Dept   04/26/21 Telemedicine Bethel Lovett DO [facility]   04/26/21 Telephone [place], [place] Pg [address] [address] today's visits and meeting all other requirements     Future Appointments  No visits were found meeting these conditions  Showing future appointments within next 150 days and meeting all other requirements      This virtual check-in was done via [employer] and patient was informed that this is a secure, HIPAA-compliant platform  She agrees to proceed  Patient agrees to participate in a virtual check in via telephone or video visit instead of presenting to the office to address urgent/immediate medical needs  Patient is aware this is a billable service  After connecting through [facility], the patient was identified by name and date of birth  Ines Leon was informed that this was a telemedicine visit and that the exam was being conducted confidentially over secure lines  My office door was closed  No one else was in the room  Ines Leon acknowledged consent and understanding of privacy and security of the telemedicine visit  I informed the patient that I have reviewed her record in Epic and presented the opportunity for her to ask any questions regarding the visit today  The patient agreed to participate  Subjective:   Ines Leon is a 44 y o  female who is concerned about COVID-19  Patient's symptoms include fever, fatigue, nasal congestion, sore throat, cough, nausea and headache  Patient denies chills, anosmia, loss of taste, shortness of breath, chest tightness, vomiting and diarrhea  Date of symptom onset: 4/23/2021    Exposure:   Contact with a person who is under investigation (PUI) for or who is positive for COVID-19 within the last 14 days?: No    Hospitalized recently for fever and/or lower respiratory symptoms?: No      Currently a healthcare worker that is involved in direct patient care?: No      Works in a special setting where the risk of COVID-19 transmission may be high? (this may include long-term care, correctional and retirement facilities; homeless shelters; assisted-living facilities and group homes ): No      Resident in a special setting where the risk of COVID-19 transmission may be high? (this may include long-term care, correctional and retirement facilities; homeless shelters; assisted-living facilities and group homes ): No      Patient was vaccinated in January but now concerned of current symptoms       No results found for: ALICJA [address]  Past Medical History:   Diagnosis Date    Graves disease     Herpes simplex infection     Migraine without aura and responsive to treatment     Postablative hypothyroidism     Varicella without complication Past Surgical History:   Procedure Laterality Date    HYSTERECTOMY      age 34   [employer] MYOMECTOMY      NEUROPLASTY / TRANSPOSITION MEDIAN NERVE AT CARPAL TUNNEL Right     TOOTH EXTRACTION       Current Outpatient Medications   Medication Sig Dispense Refill    baclofen 10 mg tablet TAKE 2 TABLETS DAILY 180 tablet 3    Cholecalciferol (VITAMIN D) 50 MCG (2000 UT) CAPS Take 1 capsule (2,000 Units total) by mouth daily      ergocalciferol (VITAMIN D2) 50,000 units TAKE 1 CAPSULE ONCE A WEEK 12 capsule 3    levothyroxine 88 mcg tablet Take 1 tablet (88 mcg total) by mouth daily 90 tablet 1    meclizine (ANTIVERT) 25 mg tablet Take 1 tablet (25 mg total) by mouth every 8 (eight) hours as needed for dizziness 100 tablet 0    Multiple Vitamins-Minerals (DAILY MULTI PO) Take by mouth      sertraline (ZOLOFT) 50 mg tablet TAKE 1 TABLET DAILY 90 tablet 3     No current facility-administered medications for this visit  No Known Allergies    Review of Systems   Constitutional: Positive for fatigue and fever  Negative for chills  HENT: Positive for congestion and sore throat  Respiratory: Positive for cough  Negative for chest tightness and shortness of breath  Gastrointestinal: Positive for nausea  Negative for diarrhea and vomiting  Neurological: Positive for headaches  Objective: There were no vitals filed for this visit  Physical Exam  Constitutional:       General: She is not in acute distress  Appearance: Normal appearance  She is not ill-appearing or toxic-appearing  HENT:      Head: Normocephalic and atraumatic  Pulmonary:      Effort: Pulmonary effort is normal    Neurological:      Mental Status: She is alert and oriented to person, place, and time  Psychiatric:         Mood and Affect: Mood normal          Behavior: Behavior normal          Thought Content:  Thought content normal          Judgment: Judgment normal        VIRTUAL VISIT DISCLAIMER    Tharaul Bentley [facility] acknowledges that she has consented to an online visit or consultation  She understands that the online visit is based solely on information provided by her, and that, in the absence of a face-to-face physical evaluation by the physician, the diagnosis she receives is both limited and provisional in terms of accuracy and completeness  This is not intended to replace a full medical face-to-face evaluation by the physician  Cris Golden understands and accepts these terms

## 2021-04-26 NOTE — ASSESSMENT & PLAN NOTE
Advised given current symptoms and timing of vaccine would recommend testing; advised on supportive care for symptoms; f/u guidance given should sx worsen; advised on quarantine until results and further instruction based on results

## 2021-04-26 NOTE — TELEPHONE ENCOUNTER
Pt called asking if she should have a COVID test, she's had both vaccines but since Friday c/o dry cough, headache, sore throat, headache, and nausea   Please advise, will schedule a COVID visit if she needs a test

## 2021-04-27 LAB — SARS-COV-2 RNA RESP QL NAA+PROBE: NEGATIVE

## 2021-06-28 ENCOUNTER — OFFICE VISIT (OUTPATIENT)
Dept: NEUROLOGY | Facility: CLINIC | Age: 51
End: 2021-06-28
Payer: COMMERCIAL

## 2021-06-28 VITALS
BODY MASS INDEX: 29.49 KG/M2 | HEIGHT: 65 IN | DIASTOLIC BLOOD PRESSURE: 72 MMHG | HEART RATE: 64 BPM | SYSTOLIC BLOOD PRESSURE: 121 MMHG | WEIGHT: 177 LBS

## 2021-06-28 DIAGNOSIS — R42 VERTIGO: ICD-10-CM

## 2021-06-28 DIAGNOSIS — G44.209 TENSION HEADACHE: Primary | ICD-10-CM

## 2021-06-28 DIAGNOSIS — R25.3 MUSCLE TWITCHING: ICD-10-CM

## 2021-06-28 PROCEDURE — 99214 OFFICE O/P EST MOD 30 MIN: CPT | Performed by: PHYSICIAN ASSISTANT

## 2021-06-28 PROCEDURE — 1036F TOBACCO NON-USER: CPT | Performed by: PHYSICIAN ASSISTANT

## 2021-06-28 PROCEDURE — 3008F BODY MASS INDEX DOCD: CPT | Performed by: PHYSICIAN ASSISTANT

## 2021-06-28 NOTE — PROGRESS NOTES
Review of Systems   Constitutional: Negative  Negative for appetite change and fever  HENT: Negative  Negative for hearing loss, tinnitus, trouble swallowing and voice change  Eyes: Positive for pain (right eye)  Negative for photophobia  Respiratory: Negative  Negative for shortness of breath  Cardiovascular: Negative  Negative for palpitations  Gastrointestinal: Positive for nausea  Negative for vomiting  Endocrine: Negative  Negative for cold intolerance  Genitourinary: Negative  Negative for dysuria, frequency and urgency  Musculoskeletal: Negative  Negative for myalgias and neck pain  Twitching occurs when resting     Skin: Negative  Negative for rash  Neurological: Positive for headaches (everyday)  Negative for dizziness, tremors, seizures, syncope, facial asymmetry, speech difficulty, weakness, light-headedness and numbness  Hematological: Negative  Does not bruise/bleed easily  Psychiatric/Behavioral: Negative  Negative for confusion, hallucinations and sleep disturbance  All other systems reviewed and are negative

## 2021-06-28 NOTE — PATIENT INSTRUCTIONS
Continue baclofen  Try magnesium and B2 supplements for headaches and if that does not work, let me know  Follow up in 4 months or sooner if needed

## 2021-06-28 NOTE — ASSESSMENT & PLAN NOTE
Patient with intermittent muscle twitching in all of the extremities  Her workup to date has been unremarkable including labs, EMG, MRI brain  No evidence of atrophy, fasciculations or weakness on exam      Again discussed this is likely benign fasciculation syndrome  Reassurance given that her neurologic workup is unremarkable for a more concerning neuromuscular disorder  Encouraged the patient to stay hydrated, get proper rest, limit caffeine/stimulants

## 2021-06-28 NOTE — ASSESSMENT & PLAN NOTE
Patient reported vertigo with some imbalance at last visit  This has improved  MRI brain with IACs did not show any pathology pointing towards a central cause of vertigo  She has history of BPPV and this is likely the cause  Can monitor  If more frequent, refer to vestibular therapy and/or ENT

## 2021-06-28 NOTE — PROGRESS NOTES
Patient ID: Cricket Whitehead is a 44 y o  female  Assessment/Plan:    Muscle twitching  Patient with intermittent muscle twitching in all of the extremities  Her workup to date has been unremarkable including labs, EMG, MRI brain  No evidence of atrophy, fasciculations or weakness on exam      Again discussed this is likely benign fasciculation syndrome  Reassurance given that her neurologic workup is unremarkable for a more concerning neuromuscular disorder  Encouraged the patient to stay hydrated, get proper rest, limit caffeine/stimulants  Tension headache  Tension headaches also with some migrainous features  These have been more prominent lately with the humidity and weather changes  She is taking baclofen 10mg BID which has been helpful  She did stop magnesium due to running out  Suggested she continue baclofen and add magnesium oxide 400mg once a day to BID (BID if no GI side effects), and also B2 (riboflavin) 400mg daily  If no improvement, consider gabapentin, Topamax  She is on a SSRI already or else would consider TCA  Vertigo  Patient reported vertigo with some imbalance at last visit  This has improved  MRI brain with IACs did not show any pathology pointing towards a central cause of vertigo  She has history of BPPV and this is likely the cause  Can monitor  If more frequent, refer to vestibular therapy and/or ENT  Patient to follow up in 4 months or sooner if needed  She was advised to call for any new or worsening symptoms  Diagnoses and all orders for this visit:    Tension headache    Muscle twitching    Vertigo           Subjective:    HPI    Patient is a 44year old right handed female who presents today for neurologic follow up  She was last seen on 2/24/2021  She was initially evaluated by Dr Anna Kearns on 6/15/2020 for several issues  Patient described that for the past 8-9 months she has been experiencing twitching   She describes a twitching which generally occurs proximally in any of the 4 extremities although occasionally will occur in the hands when she is working on the computer  She states that this is particularly apparent when she is resting  She has had no associated weakness  She has had no associated sensory symptoms  She describes no symptoms to suggest a Lhermitte's phenomenon  There has been no change in bowel or bladder function  In reviewing past studies she was found to be vitamin-D deficient  She was put on supplement  Has not noted any improvement in her agreed describe a twitching  Her father has Parkinson's disease  However, she is unaware of any family member with neuro muscular disease  She also described headaches  These date back a long time but have become problematic in terms of frequency over the past 8-9 months as well  These are described as generally right frontoparietal in location  She describes these predominantly as tightness, with rare modest pulsatile component  These have been occurring in the recent months on average of every other day and she does take Excedrin 2 tablets generally every other day, suggesting the possibility analgesic overuse effect as well  Labs completed 8/13/2020 with normal CK, slightly low aldolase, slightly elevated TSH at 4 25 with normal FT4 1 12, normal magnesium, low vit D (29), normal folate, normal ionized calcium, essentially unremarkable CBC and CMP  EMG of the LUE/LLE completed 10/14/2020 by Dr Franca Waters was a normal study with no evidence of a generalized neurogenic or myopathic process effecting the peripheral nervous system  At timing of last visit in Feb 2021 she reported some issues with vertigo  She has a history of BPPV, but more recently she experienced some episodes of vertigo associated with head pressure that seemed to be brought on by stress  Does not occur with head movement  Also with some imbalance    MRI brain with IACs w and wo contrast was completed 4/14/2021  Normal appearance of the IACs  Scattered FLAIR and T2 foci are seen most compatible with chronic microangiopathic changes in patients of this age group  Today, patient reports she is about the same  She is taking the baclofen 10mg BID  Still getting occasional twitching  Vertigo has improved  She is getting some more headaches lately due to all the changes in weather, dl with higher humidity  She is not taking magnesium anymore, just ran out of it and did not continue  Feels she is sleeping well, staying hydrated and eating regularly  No new neurologic symptoms  The following portions of the patient's history were reviewed and updated as appropriate: current medications, past family history, past medical history, past social history, past surgical history and problem list          Objective:    Blood pressure 121/72, pulse 64, height 5' 5" (1 651 m), weight 80 3 kg (177 lb), not currently breastfeeding  Physical Exam  Constitutional:       Appearance: Normal appearance  HENT:      Head: Normocephalic and atraumatic  Eyes:      Extraocular Movements: EOM normal       Pupils: Pupils are equal, round, and reactive to light  Skin:     General: Skin is warm and dry  Neurological:      Mental Status: She is alert  Coordination: Coordination is intact  Deep Tendon Reflexes: Strength normal and reflexes are normal and symmetric  Psychiatric:         Mood and Affect: Mood normal          Speech: Speech normal          Behavior: Behavior normal          Neurological Exam  Mental Status  Alert  Oriented to person, place, time and situation  Speech is normal  Language is fluent with no aphasia  Attention and concentration are normal     Cranial Nerves  CN II: Visual fields full to confrontation  CN III, IV, VI: Extraocular movements intact bilaterally  Pupils equal round and reactive to light bilaterally    CN V: Facial sensation is normal   CN VII: Full and symmetric facial movement  CN VIII: Hearing is normal   CN IX, X: Palate elevates symmetrically  CN XI: Shoulder shrug strength is normal   CN XII: Tongue midline without atrophy or fasciculations  Motor   Normal muscle tone  Strength is 5/5 throughout all four extremities  Sensory  Light touch is normal in upper and lower extremities  Reflexes  Deep tendon reflexes are 2+ and symmetric in all four extremities with downgoing toes bilaterally  Coordination  Finger-to-nose, rapid alternating movements and heel-to-shin normal bilaterally without dysmetria  Gait  Casual gait is normal including stance, stride, and arm swing  ROS:    Review of Systems   Constitutional: Negative  Negative for fatigue and fever  HENT: Negative  Negative for hearing loss, tinnitus and trouble swallowing  Eyes: Negative for photophobia, pain and visual disturbance  Respiratory: Negative  Negative for cough and shortness of breath  Cardiovascular: Negative  Gastrointestinal: Negative for constipation, diarrhea, nausea and vomiting  Endocrine: Negative  Genitourinary: Negative  Musculoskeletal: Negative  Negative for gait problem  Skin: Negative  Neurological: Positive for headaches  Negative for dizziness, tremors, syncope, speech difficulty and weakness  Twitching    Hematological: Negative  Psychiatric/Behavioral: Negative for confusion, decreased concentration, dysphoric mood, hallucinations, sleep disturbance and suicidal ideas  The patient is not nervous/anxious          I personally reviewed and updated the ROS as appropriate

## 2021-06-28 NOTE — ASSESSMENT & PLAN NOTE
Tension headaches also with some migrainous features  These have been more prominent lately with the humidity and weather changes  She is taking baclofen 10mg BID which has been helpful  She did stop magnesium due to running out  Suggested she continue baclofen and add magnesium oxide 400mg once a day to BID (BID if no GI side effects), and also B2 (riboflavin) 400mg daily  If no improvement, consider gabapentin, Topamax  She is on a SSRI already or else would consider TCA

## 2021-07-28 ENCOUNTER — VBI (OUTPATIENT)
Dept: ADMINISTRATIVE | Facility: OTHER | Age: 51
End: 2021-07-28

## 2021-10-20 ENCOUNTER — TELEPHONE (OUTPATIENT)
Dept: NEUROLOGY | Facility: CLINIC | Age: 51
End: 2021-10-20

## 2021-10-29 ENCOUNTER — OFFICE VISIT (OUTPATIENT)
Dept: NEUROLOGY | Facility: CLINIC | Age: 51
End: 2021-10-29
Payer: COMMERCIAL

## 2021-10-29 VITALS
HEART RATE: 75 BPM | TEMPERATURE: 97.2 F | DIASTOLIC BLOOD PRESSURE: 64 MMHG | BODY MASS INDEX: 30.06 KG/M2 | RESPIRATION RATE: 18 BRPM | WEIGHT: 180.4 LBS | HEIGHT: 65 IN | SYSTOLIC BLOOD PRESSURE: 138 MMHG

## 2021-10-29 DIAGNOSIS — G44.209 TENSION HEADACHE: Primary | ICD-10-CM

## 2021-10-29 DIAGNOSIS — R25.3 MUSCLE TWITCHING: ICD-10-CM

## 2021-10-29 PROCEDURE — 99214 OFFICE O/P EST MOD 30 MIN: CPT | Performed by: PHYSICIAN ASSISTANT

## 2021-12-03 ENCOUNTER — AMB VIDEO VISIT (OUTPATIENT)
Dept: OTHER | Facility: HOSPITAL | Age: 51
End: 2021-12-03
Payer: COMMERCIAL

## 2021-12-03 DIAGNOSIS — Z20.822 CLOSE EXPOSURE TO COVID-19 VIRUS: Primary | ICD-10-CM

## 2021-12-03 PROCEDURE — 99212 OFFICE O/P EST SF 10 MIN: CPT | Performed by: PHYSICIAN ASSISTANT

## 2021-12-03 PROCEDURE — U0003 INFECTIOUS AGENT DETECTION BY NUCLEIC ACID (DNA OR RNA); SEVERE ACUTE RESPIRATORY SYNDROME CORONAVIRUS 2 (SARS-COV-2) (CORONAVIRUS DISEASE [COVID-19]), AMPLIFIED PROBE TECHNIQUE, MAKING USE OF HIGH THROUGHPUT TECHNOLOGIES AS DESCRIBED BY CMS-2020-01-R: HCPCS | Performed by: PHYSICIAN ASSISTANT

## 2021-12-03 PROCEDURE — U0005 INFEC AGEN DETEC AMPLI PROBE: HCPCS | Performed by: PHYSICIAN ASSISTANT

## 2021-12-29 ENCOUNTER — PATIENT MESSAGE (OUTPATIENT)
Dept: FAMILY MEDICINE CLINIC | Facility: CLINIC | Age: 51
End: 2021-12-29

## 2021-12-30 DIAGNOSIS — E55.9 VITAMIN D DEFICIENCY: ICD-10-CM

## 2021-12-30 DIAGNOSIS — F41.9 ANXIETY: ICD-10-CM

## 2021-12-30 RX ORDER — ERGOCALCIFEROL 1.25 MG/1
50000 CAPSULE ORAL WEEKLY
Qty: 12 CAPSULE | Refills: 0 | Status: SHIPPED | OUTPATIENT
Start: 2021-12-30 | End: 2022-03-28 | Stop reason: SDUPTHER

## 2022-01-08 ENCOUNTER — AMB VIDEO VISIT (OUTPATIENT)
Dept: OTHER | Facility: HOSPITAL | Age: 52
End: 2022-01-08
Payer: COMMERCIAL

## 2022-01-08 DIAGNOSIS — N30.00 ACUTE CYSTITIS WITHOUT HEMATURIA: Primary | ICD-10-CM

## 2022-01-08 PROCEDURE — 99212 OFFICE O/P EST SF 10 MIN: CPT | Performed by: PHYSICIAN ASSISTANT

## 2022-01-08 RX ORDER — PHENAZOPYRIDINE HYDROCHLORIDE 200 MG/1
200 TABLET, FILM COATED ORAL
Qty: 10 TABLET | Refills: 0 | Status: SHIPPED | OUTPATIENT
Start: 2022-01-08 | End: 2022-02-08 | Stop reason: SDUPTHER

## 2022-01-08 RX ORDER — NITROFURANTOIN 25; 75 MG/1; MG/1
100 CAPSULE ORAL 2 TIMES DAILY
Qty: 14 CAPSULE | Refills: 0 | Status: SHIPPED | OUTPATIENT
Start: 2022-01-08 | End: 2022-01-15

## 2022-01-08 NOTE — PROGRESS NOTES
Video Visit - Price Carey 46 y o  female MRN: [id number]    REQUIRED DOCUMENTATION:         1  This service was provided via [employer]  2  Provider located at [address] [place] [id number]  3  [facility] provider: Johana Mooney PA-C   4  Identify all parties in room with patient during [facility] visit:  No one else  5  After connecting through [employer], patient was identified by name and date of birth  Patient was then informed that this was a Telemedicine visit and that the exam was being conducted confidentially over secure lines  My office door was closed  No one else was in the room  Patient acknowledged consent and understanding of privacy and security of the Telemedicine visit  I informed the patient that I have reviewed their record in Epic and presented the opportunity for them to ask any questions regarding the visit today  The patient agreed to participate  HPI  Pt reports starting last night urgency, dysuria, pressure  No blood, back pain or fever  Denies concern for STI  No change in vaginal discharge  No chance of pregnancy  Physical Exam  Constitutional:       General: She is not in acute distress  Appearance: Normal appearance  She is not toxic-appearing  HENT:      Head: Normocephalic and atraumatic  Nose: No rhinorrhea  Mouth/Throat:      Mouth: Mucous membranes are moist    Eyes:      Conjunctiva/sclera: Conjunctivae normal    Pulmonary:      Effort: Pulmonary effort is normal  No respiratory distress  Breath sounds: No wheezing (no gross audible wheeze through computer)  Musculoskeletal:      Cervical back: Normal range of motion  Skin:     Findings: No rash (on face or neck)  Neurological:      Mental Status: She is alert  Cranial Nerves: No dysarthria or facial asymmetry     Psychiatric:         Mood and Affect: Mood normal          Behavior: Behavior normal          Diagnoses and all orders for this visit:    Acute cystitis without hematuria  -     nitrofurantoin (MACROBID) 100 mg capsule; Take 1 capsule (100 mg total) by mouth 2 (two) times a day for 7 days  -     phenazopyridine (PYRIDIUM) 200 mg tablet; Take 1 tablet (200 mg total) by mouth 3 (three) times a day with meals      Patient Instructions   Schedule a follow-up appointment with your primary care physician for recheck in 2-3 days  If you cannot see your PCP, you can schedule a follow up appointment at a [address] Now   Go to the emergency department if you develop any new or worsening symptoms including back pain, fever, or anything else that is concerning     1 21 ) FRANCISCO ([phone])  Schedule or Reschedule Outpatient Testing - Option 2  Billing - Option 3  General Info - Option 4  MyChart Help - Option 5  Comprehensive Spine Program - Option 6   COVID - Option 7

## 2022-01-08 NOTE — CARE ANYWHERE EVISITS
Visit Summary for Ramsey Nieves - Gender: Female - Date of Birth: [id number]  Date: [id number] - Duration: 3 minutes  Patient: Ramsey Lizbeth  Provider: Santiago Chaudhary PA-C    Patient Contact Information  Address  [address] [place] [address] Road; [address]  [phone]    Visit Topics  uti, urgency to go, burning, pressure  [Added By: Self - 2022-01-08]    Triage Questions   What is your current physical address in the event of a medical emergency? Answer []  Are you allergic to any medications? Answer []  Are you now or could you be pregnant? Answer []  Do you have any immune system compromise or chronic lung   disease? Answer []  Do you have any vulnerable family members in the home (infant, pregnant, cancer, elderly)? Answer []     Conversation Transcripts  [0A][0A] [Notification] You are connected with Santiago Chaudhary PA-C, Urgent Care Specialist [0A][Notification] Ramsey Nieves is located in [place]  [0A][Notification] Ramsey Nieves has shared health history  Key Singh  [0A]    Diagnosis  Acute cystitis without hematuria    Procedures  Value: 41000 Code: CPT-4 UNLISTED E&M SERVICE    Medications Prescribed    Synthroid      Frequency :           Zoloft      Frequency :           baclofen (bulk)      Frequency :             Electronically signed by: Lucy Baldwin(NPI [id number])

## 2022-01-08 NOTE — PATIENT INSTRUCTIONS
Schedule a follow-up appointment with your primary care physician for recheck in 2-3 days  If you cannot see your PCP, you can schedule a follow up appointment at a [address] Now   Go to the emergency department if you develop any new or worsening symptoms including back pain, fever, or anything else that is concerning     1 21 ) FRANCISCO ([phone])  Schedule or Reschedule Outpatient Testing - Option 2  Billing - Option 3  General Info - Option 4  MyChart Help - Option 5  Comprehensive Spine Program - Option 6   COVID - Option 7

## 2022-02-05 ENCOUNTER — APPOINTMENT (OUTPATIENT)
Dept: LAB | Facility: CLINIC | Age: 52
End: 2022-02-05
Payer: COMMERCIAL

## 2022-02-05 DIAGNOSIS — E55.9 AVITAMINOSIS D: ICD-10-CM

## 2022-02-05 DIAGNOSIS — E78.2 MIXED HYPERLIPIDEMIA: Primary | ICD-10-CM

## 2022-02-05 DIAGNOSIS — E89.0 POSTSURGICAL HYPOTHYROIDISM: ICD-10-CM

## 2022-02-05 LAB
25(OH)D3 SERPL-MCNC: 52.1 NG/ML (ref 30–100)
ALBUMIN SERPL BCP-MCNC: 3.8 G/DL (ref 3.5–5)
ALP SERPL-CCNC: 128 U/L (ref 46–116)
ALT SERPL W P-5'-P-CCNC: 74 U/L (ref 12–78)
ANION GAP SERPL CALCULATED.3IONS-SCNC: 3 MMOL/L (ref 4–13)
AST SERPL W P-5'-P-CCNC: 49 U/L (ref 5–45)
BASOPHILS # BLD AUTO: 0.07 THOUSANDS/ΜL (ref 0–0.1)
BASOPHILS NFR BLD AUTO: 2 % (ref 0–1)
BILIRUB SERPL-MCNC: 0.61 MG/DL (ref 0.2–1)
BUN SERPL-MCNC: 15 MG/DL (ref 5–25)
CALCIUM SERPL-MCNC: 9.6 MG/DL (ref 8.3–10.1)
CHLORIDE SERPL-SCNC: 108 MMOL/L (ref 100–108)
CHOLEST SERPL-MCNC: 239 MG/DL
CO2 SERPL-SCNC: 27 MMOL/L (ref 21–32)
CREAT SERPL-MCNC: 0.85 MG/DL (ref 0.6–1.3)
EOSINOPHIL # BLD AUTO: 0.28 THOUSAND/ΜL (ref 0–0.61)
EOSINOPHIL NFR BLD AUTO: 6 % (ref 0–6)
ERYTHROCYTE [DISTWIDTH] IN BLOOD BY AUTOMATED COUNT: 13.3 % (ref 11.6–15.1)
GFR SERPL CREATININE-BSD FRML MDRD: 78 ML/MIN/1.73SQ M
GLUCOSE P FAST SERPL-MCNC: 87 MG/DL (ref 65–99)
HCT VFR BLD AUTO: 42.2 % (ref 34.8–46.1)
HDLC SERPL-MCNC: 65 MG/DL
HGB BLD-MCNC: 13.1 G/DL (ref 11.5–15.4)
IMM GRANULOCYTES # BLD AUTO: 0.02 THOUSAND/UL (ref 0–0.2)
IMM GRANULOCYTES NFR BLD AUTO: 0 % (ref 0–2)
LDLC SERPL CALC-MCNC: 156 MG/DL (ref 0–100)
LYMPHOCYTES # BLD AUTO: 1.24 THOUSANDS/ΜL (ref 0.6–4.47)
LYMPHOCYTES NFR BLD AUTO: 26 % (ref 14–44)
MCH RBC QN AUTO: 29.1 PG (ref 26.8–34.3)
MCHC RBC AUTO-ENTMCNC: 31 G/DL (ref 31.4–37.4)
MCV RBC AUTO: 94 FL (ref 82–98)
MONOCYTES # BLD AUTO: 0.46 THOUSAND/ΜL (ref 0.17–1.22)
MONOCYTES NFR BLD AUTO: 10 % (ref 4–12)
NEUTROPHILS # BLD AUTO: 2.65 THOUSANDS/ΜL (ref 1.85–7.62)
NEUTS SEG NFR BLD AUTO: 56 % (ref 43–75)
NRBC BLD AUTO-RTO: 0 /100 WBCS
PLATELET # BLD AUTO: 279 THOUSANDS/UL (ref 149–390)
PMV BLD AUTO: 10.7 FL (ref 8.9–12.7)
POTASSIUM SERPL-SCNC: 4.6 MMOL/L (ref 3.5–5.3)
PROT SERPL-MCNC: 8 G/DL (ref 6.4–8.2)
RBC # BLD AUTO: 4.5 MILLION/UL (ref 3.81–5.12)
SODIUM SERPL-SCNC: 138 MMOL/L (ref 136–145)
TRIGL SERPL-MCNC: 89 MG/DL
TSH SERPL DL<=0.05 MIU/L-ACNC: 0.89 UIU/ML (ref 0.36–3.74)
WBC # BLD AUTO: 4.72 THOUSAND/UL (ref 4.31–10.16)

## 2022-02-05 PROCEDURE — 80053 COMPREHEN METABOLIC PANEL: CPT

## 2022-02-05 PROCEDURE — 82306 VITAMIN D 25 HYDROXY: CPT

## 2022-02-05 PROCEDURE — 84443 ASSAY THYROID STIM HORMONE: CPT

## 2022-02-05 PROCEDURE — 80061 LIPID PANEL: CPT

## 2022-02-05 PROCEDURE — 85025 COMPLETE CBC W/AUTO DIFF WBC: CPT

## 2022-02-05 PROCEDURE — 36415 COLL VENOUS BLD VENIPUNCTURE: CPT

## 2022-02-08 ENCOUNTER — OFFICE VISIT (OUTPATIENT)
Dept: FAMILY MEDICINE CLINIC | Facility: CLINIC | Age: 52
End: 2022-02-08
Payer: COMMERCIAL

## 2022-02-08 VITALS
SYSTOLIC BLOOD PRESSURE: 126 MMHG | TEMPERATURE: 98.6 F | WEIGHT: 181.2 LBS | HEART RATE: 86 BPM | BODY MASS INDEX: 30.19 KG/M2 | OXYGEN SATURATION: 97 % | HEIGHT: 65 IN | DIASTOLIC BLOOD PRESSURE: 90 MMHG

## 2022-02-08 DIAGNOSIS — R51.9 PERSISTENT HEADACHES: ICD-10-CM

## 2022-02-08 DIAGNOSIS — N39.0 RECURRENT UTI: ICD-10-CM

## 2022-02-08 DIAGNOSIS — R31.9 HEMATURIA, UNSPECIFIED TYPE: ICD-10-CM

## 2022-02-08 DIAGNOSIS — R39.9 UTI SYMPTOMS: Primary | ICD-10-CM

## 2022-02-08 LAB
SL AMB  POCT GLUCOSE, UA: ABNORMAL
SL AMB LEUKOCYTE ESTERASE,UA: ABNORMAL
SL AMB POCT BILIRUBIN,UA: NEGATIVE
SL AMB POCT BLOOD,UA: ABNORMAL
SL AMB POCT CLARITY,UA: CLEAR
SL AMB POCT COLOR,UA: ABNORMAL
SL AMB POCT KETONES,UA: NEGATIVE
SL AMB POCT NITRITE,UA: POSITIVE
SL AMB POCT PH,UA: 6
SL AMB POCT SPECIFIC GRAVITY,UA: >=1.03
SL AMB POCT URINE PROTEIN: ABNORMAL
SL AMB POCT UROBILINOGEN: 1

## 2022-02-08 PROCEDURE — 87086 URINE CULTURE/COLONY COUNT: CPT | Performed by: NURSE PRACTITIONER

## 2022-02-08 PROCEDURE — 99213 OFFICE O/P EST LOW 20 MIN: CPT | Performed by: NURSE PRACTITIONER

## 2022-02-08 PROCEDURE — 87186 SC STD MICRODIL/AGAR DIL: CPT | Performed by: NURSE PRACTITIONER

## 2022-02-08 PROCEDURE — 1036F TOBACCO NON-USER: CPT | Performed by: NURSE PRACTITIONER

## 2022-02-08 PROCEDURE — 3725F SCREEN DEPRESSION PERFORMED: CPT | Performed by: NURSE PRACTITIONER

## 2022-02-08 PROCEDURE — 87077 CULTURE AEROBIC IDENTIFY: CPT | Performed by: NURSE PRACTITIONER

## 2022-02-08 PROCEDURE — 81003 URINALYSIS AUTO W/O SCOPE: CPT | Performed by: NURSE PRACTITIONER

## 2022-02-08 PROCEDURE — 3008F BODY MASS INDEX DOCD: CPT | Performed by: NURSE PRACTITIONER

## 2022-02-08 RX ORDER — NITROFURANTOIN 25; 75 MG/1; MG/1
100 CAPSULE ORAL 2 TIMES DAILY
Qty: 10 CAPSULE | Refills: 0 | Status: SHIPPED | OUTPATIENT
Start: 2022-02-08 | End: 2022-02-13

## 2022-02-08 RX ORDER — PHENAZOPYRIDINE HYDROCHLORIDE 200 MG/1
200 TABLET, FILM COATED ORAL
Qty: 10 TABLET | Refills: 0 | Status: SHIPPED | OUTPATIENT
Start: 2022-02-08 | End: 2022-03-10 | Stop reason: ALTCHOICE

## 2022-02-09 RX ORDER — BACLOFEN 10 MG/1
TABLET ORAL
Qty: 180 TABLET | Refills: 0 | Status: SHIPPED | OUTPATIENT
Start: 2022-02-09 | End: 2022-04-29 | Stop reason: SDUPTHER

## 2022-02-11 LAB
BACTERIA UR CULT: ABNORMAL
BACTERIA UR CULT: ABNORMAL

## 2022-02-17 NOTE — PROGRESS NOTES
[facility]    ASSESSMENT AND PLAN     1  UTI symptoms  Will treat today for presumed to Rx today for nitrofurantoin  Dose/use reviewed  Maintain good hydration  Pyridium p r n     Sample sent for culture  Will contact if results indicate alternate treatment  Follow up with further problems or concerns    - POCT urine dip auto non-scope  - Urine culture  - nitrofurantoin (MACROBID) 100 mg capsule; Take 1 capsule (100 mg total) by mouth 2 (two) times a day for 5 days  Dispense: 10 capsule; Refill: 0  - phenazopyridine (PYRIDIUM) 200 mg tablet; Take 1 tablet (200 mg total) by mouth 3 (three) times a day with meals  Dispense: 10 tablet; Refill: 0    2  Recurrent UTI  Patient with recurrent UTI-3rd in the last few months  Further assess with kidney/bladder ultrasound  - US kidney and bladder; Future    3  Hematuria, unspecified type    - US kidney and bladder; Future            SUBJECTIVE       Patient ID: Kathya Velez is a 46 y o  female  Chief Complaint   Patient presents with    Possible UTI     Symptoms started today, urgency, frequency, painful urination  No blood seen  HISTORY OF PRESENT ILLNESS    Presents today with urinary symptoms  Started today:  Urgency, frequency, dysuria    Difficulty Urinating   This is a recurrent problem  The current episode started today  The problem occurs every urination  The problem has been unchanged  The quality of the pain is described as burning  The pain is at a severity of 6/10  There has been no fever  She is not sexually active  There is no history of pyelonephritis  Associated symptoms include flank pain, frequency and urgency  Pertinent negatives include no chills, discharge, hematuria, hesitancy, nausea, possible pregnancy, sweats or vomiting           The following portions of the patient's history were reviewed and updated as appropriate: allergies, current medications, past family history, past medical history, past social history, past surgical history and problem list     REVIEW OF SYSTEMS  Review of Systems   Constitutional: Negative for chills  Gastrointestinal: Negative for nausea and vomiting  Genitourinary: Positive for dysuria, flank pain, frequency and urgency  Negative for hematuria and hesitancy  OBJECTIVE      VITAL SIGNS  /90 (BP Location: Left arm, Patient Position: Sitting, Cuff Size: Adult)   Pulse 86   Temp 98 6 °F (37 °C)   Ht 5' 5" (1 651 m)   Wt 82 2 kg (181 lb 3 2 oz)   LMP  (LMP Unknown)   SpO2 97%   BMI 30 15 kg/m²     CURRENT MEDICATIONS    Current Outpatient Medications:     ergocalciferol (VITAMIN D2) 50,000 units, Take 1 capsule (50,000 Units total) by mouth once a week, Disp: 12 capsule, Rfl: 0    levothyroxine 88 mcg tablet, TAKE 1 TABLET DAILY, Disp: 90 tablet, Rfl: 0    Multiple Vitamins-Minerals (DAILY MULTI PO), Take by mouth, Disp: , Rfl:     phenazopyridine (PYRIDIUM) 200 mg tablet, Take 1 tablet (200 mg total) by mouth 3 (three) times a day with meals, Disp: 10 tablet, Rfl: 0    sertraline (ZOLOFT) 50 mg tablet, Take 1 tablet (50 mg total) by mouth daily, Disp: 90 tablet, Rfl: 0    baclofen 10 mg tablet, TAKE 2 TABLETS DAILY, Disp: 180 tablet, Rfl: 0    Cholecalciferol (VITAMIN D) 50 MCG (2000 UT) CAPS, Take 1 capsule (2,000 Units total) by mouth daily (Patient not taking: Reported on 2/8/2022 ), Disp: , Rfl:     meclizine (ANTIVERT) 25 mg tablet, Take 1 tablet (25 mg total) by mouth every 8 (eight) hours as needed for dizziness (Patient not taking: Reported on 10/29/2021), Disp: 100 tablet, Rfl: 0      PHYSICAL EXAMINATION   Physical Exam  Vitals and nursing note reviewed  Constitutional:       General: She is not in acute distress  Appearance: Normal appearance  She is well-developed and well-groomed  She is not ill-appearing  Pulmonary:      Effort: Pulmonary effort is normal  No respiratory distress  Skin:     General: Skin is warm and dry     Neurological: General: No focal deficit present  Mental Status: She is alert and oriented to person, place, and time     Psychiatric:         Attention and Perception: Attention normal          Mood and Affect: Mood normal          Speech: Speech normal          Behavior: Behavior normal

## 2022-02-20 DIAGNOSIS — E03.9 HYPOTHYROIDISM, UNSPECIFIED TYPE: ICD-10-CM

## 2022-02-21 RX ORDER — LEVOTHYROXINE SODIUM 88 UG/1
TABLET ORAL
Qty: 90 TABLET | Refills: 3 | Status: SHIPPED | OUTPATIENT
Start: 2022-02-21

## 2022-02-23 ENCOUNTER — HOSPITAL ENCOUNTER (OUTPATIENT)
Dept: ULTRASOUND IMAGING | Facility: HOSPITAL | Age: 52
Discharge: HOME | End: 2022-02-23
Payer: COMMERCIAL

## 2022-02-23 DIAGNOSIS — R31.9 HEMATURIA, UNSPECIFIED TYPE: ICD-10-CM

## 2022-02-23 DIAGNOSIS — N39.0 RECURRENT UTI: ICD-10-CM

## 2022-02-23 PROCEDURE — 76770 US EXAM ABDO BACK WALL COMP: CPT

## 2022-02-26 ENCOUNTER — APPOINTMENT (OUTPATIENT)
Dept: LAB | Facility: CLINIC | Age: 52
End: 2022-02-26
Payer: COMMERCIAL

## 2022-02-26 DIAGNOSIS — R79.89 ELEVATED LIVER FUNCTION TESTS: ICD-10-CM

## 2022-02-26 LAB
ALBUMIN SERPL BCP-MCNC: 3.7 G/DL (ref 3.5–5)
ALP SERPL-CCNC: 138 U/L (ref 46–116)
ALT SERPL W P-5'-P-CCNC: 100 U/L (ref 12–78)
AST SERPL W P-5'-P-CCNC: 54 U/L (ref 5–45)
BILIRUB DIRECT SERPL-MCNC: 0.14 MG/DL (ref 0–0.2)
BILIRUB SERPL-MCNC: 0.68 MG/DL (ref 0.2–1)
PROT SERPL-MCNC: 8.1 G/DL (ref 6.4–8.2)

## 2022-02-26 PROCEDURE — 36415 COLL VENOUS BLD VENIPUNCTURE: CPT

## 2022-02-26 PROCEDURE — 80076 HEPATIC FUNCTION PANEL: CPT

## 2022-03-03 ENCOUNTER — RA CDI HCC (OUTPATIENT)
Dept: OTHER | Facility: HOSPITAL | Age: 52
End: 2022-03-03

## 2022-03-03 NOTE — PROGRESS NOTES
[facility]  coding opportunities       Chart reviewed, no opportunity found: CHART REVIEWED, NO OPPORTUNITY FOUND                        Patients insurance company: [employer] (Medicare Advantage and Commercial)

## 2022-03-10 ENCOUNTER — OFFICE VISIT (OUTPATIENT)
Dept: FAMILY MEDICINE CLINIC | Facility: CLINIC | Age: 52
End: 2022-03-10
Payer: COMMERCIAL

## 2022-03-10 ENCOUNTER — TELEPHONE (OUTPATIENT)
Dept: ADMINISTRATIVE | Facility: OTHER | Age: 52
End: 2022-03-10

## 2022-03-10 VITALS
WEIGHT: 183.4 LBS | RESPIRATION RATE: 18 BRPM | BODY MASS INDEX: 30.56 KG/M2 | SYSTOLIC BLOOD PRESSURE: 126 MMHG | DIASTOLIC BLOOD PRESSURE: 82 MMHG | OXYGEN SATURATION: 97 % | HEIGHT: 65 IN | TEMPERATURE: 97.2 F | HEART RATE: 68 BPM

## 2022-03-10 DIAGNOSIS — Z00.00 ANNUAL PHYSICAL EXAM: ICD-10-CM

## 2022-03-10 DIAGNOSIS — E89.0 POSTABLATIVE HYPOTHYROIDISM: ICD-10-CM

## 2022-03-10 DIAGNOSIS — Z01.419 WELL WOMAN EXAM WITH ROUTINE GYNECOLOGICAL EXAM: Primary | ICD-10-CM

## 2022-03-10 DIAGNOSIS — R74.8 ELEVATED LIVER ENZYMES: ICD-10-CM

## 2022-03-10 DIAGNOSIS — N39.0 FREQUENT UTI: ICD-10-CM

## 2022-03-10 DIAGNOSIS — Z12.31 BREAST CANCER SCREENING BY MAMMOGRAM: ICD-10-CM

## 2022-03-10 LAB
SL AMB  POCT GLUCOSE, UA: NORMAL
SL AMB LEUKOCYTE ESTERASE,UA: NORMAL
SL AMB POCT BILIRUBIN,UA: NORMAL
SL AMB POCT BLOOD,UA: NORMAL
SL AMB POCT CLARITY,UA: CLEAR
SL AMB POCT COLOR,UA: YELLOW
SL AMB POCT KETONES,UA: NORMAL
SL AMB POCT NITRITE,UA: NORMAL
SL AMB POCT PH,UA: 5
SL AMB POCT SPECIFIC GRAVITY,UA: >=1.03
SL AMB POCT URINE PROTEIN: NORMAL
SL AMB POCT UROBILINOGEN: 0.2

## 2022-03-10 PROCEDURE — 3008F BODY MASS INDEX DOCD: CPT | Performed by: FAMILY MEDICINE

## 2022-03-10 PROCEDURE — 81003 URINALYSIS AUTO W/O SCOPE: CPT | Performed by: FAMILY MEDICINE

## 2022-03-10 PROCEDURE — 1036F TOBACCO NON-USER: CPT | Performed by: FAMILY MEDICINE

## 2022-03-10 PROCEDURE — 99396 PREV VISIT EST AGE 40-64: CPT | Performed by: FAMILY MEDICINE

## 2022-03-10 NOTE — TELEPHONE ENCOUNTER
Upon review of the In Basket request we were able to locate, review, and update the patient chart as requested for HIV  Any additional questions or concerns should be emailed to the Practice Liaisons via [email or web address] email, please do not reply via In Basket      Thank you  Eduardo Hahn MA

## 2022-03-10 NOTE — TELEPHONE ENCOUNTER
----- Message from Mickey Blanco sent at 3/9/2022  4:52 PM EST -----  Regarding: Care Gap Request  03/09/22 4:52 PM    Hello, our patient Gillian Beltran has had HIV completed/performed  Please assist in updating the patient chart by pulling the Care Everywhere (CE) document  The date of service is 2006       Thank you,  Ryan Patel MA  [facility]

## 2022-03-11 ENCOUNTER — TELEPHONE (OUTPATIENT)
Dept: ADMINISTRATIVE | Facility: OTHER | Age: 52
End: 2022-03-11

## 2022-03-11 NOTE — TELEPHONE ENCOUNTER
Upon review of the In Basket request we found in [facility] office note from 9/8/2014  No need for out reach this note is enough     Any additional questions or concerns should be emailed to the Practice Liaisons via [email or web address] email, please do not reply via In Basket      Thank you  Vanessa Patel MA

## 2022-03-11 NOTE — PROGRESS NOTES
ADULT ANNUAL [address]  [facility]    NAME: Jenaro Morris  AGE: 46 y o  SEX: female  : 1970     DATE: 3/29/2022     Assessment and Plan:   Patient is seen for well adult exam   Problem List Items Addressed This Visit        Endocrine    Hypothyroidism      Other Visit Diagnoses     Well woman exam with routine gynecological exam    -  Primary    Frequent UTI        Relevant Orders    POCT urine dip auto non-scope (Completed)    Breast cancer screening by mammogram        Relevant Orders    Mammo screening bilateral w 3d & cad    Elevated liver enzymes        Relevant Orders    US abdomen complete    BMI 30 0-30 9,adult        Annual physical exam          Discussed elevated liver enzymes - willl check an US  Immunizations and preventive care screenings were discussed with patient today  Appropriate education was printed on patient's after visit summary  Counseling:  Alcohol/drug use: discussed moderation in alcohol intake, the recommendations for healthy alcohol use, and avoidance of illicit drug use  Dental Health: discussed importance of regular tooth brushing, flossing, and dental visits  · Exercise: the importance of regular exercise/physical activity was discussed  Recommend exercise 3-5 times per week for at least 30 minutes  No follow-ups on file  Chief Complaint:     Chief Complaint   Patient presents with    Gynecologic Exam      History of Present Illness:     Adult Annual Physical   Patient here for a comprehensive physical exam  The patient reports problems - weight gain  Hasn't changed eating but has been gaining weight  Never exercised  Diet and Physical Activity  · Diet/Nutrition: limited fruits/vegetables  · Exercise: no formal exercise        Depression Screening  PHQ-2/9 Depression Screening    Little interest or pleasure in doing things: 0 - not at all  Feeling down, depressed, or hopeless: 0 - not at all       [address]  · Sleep: sleeps well  Takes Baclofen and melatonin at night  · Hearing: normal - bilateral   · Vision: wears contacts  · Dental: regular dental visits  /GYN Health  · Patient is: postmenopausal     Review of Systems:     Review of Systems   Constitutional: Positive for unexpected weight change  Negative for chills and fever  HENT: Negative for congestion and sore throat  Respiratory: Negative for chest tightness  Cardiovascular: Negative for chest pain and palpitations  Gastrointestinal: Negative for abdominal pain, constipation, diarrhea and nausea  Genitourinary: Negative for difficulty urinating  Musculoskeletal: Positive for arthralgias  Skin: Negative  Neurological: Negative for dizziness and headaches  Psychiatric/Behavioral: Negative  Past Medical History:     Past Medical History:   Diagnosis Date    Disease of thyroid gland     Graves disease     Headache(784 0)     Herpes simplex infection     Migraine without aura and responsive to treatment     Postablative hypothyroidism     Varicella without complication       Past Surgical History:     Past Surgical History:   Procedure Laterality Date    HYSTERECTOMY      age 34   Mavis Cousin MYOMECTOMY      NEUROPLASTY / TRANSPOSITION MEDIAN NERVE AT CARPAL TUNNEL Right     TOOTH EXTRACTION        Social History:     Social History     Socioeconomic History    Marital status: /Civil Union     Spouse name: Not on file    Number of children: Not on file    Years of education: Not on file    Highest education level: Not on file   Occupational History    Not on file   Tobacco Use    Smoking status: Never Smoker    Smokeless tobacco: Never Used   Vaping Use    Vaping Use: Never used   Substance and Sexual Activity    Alcohol use:  Yes     Alcohol/week: 0 0 standard drinks     Comment: drink a few glasses of wine on special occasions    Drug use: No    Sexual activity: Not Currently Partners: Male     Birth control/protection: Surgical   Other Topics Concern    Not on file   Social History Narrative    Not on file     Social Determinants of Health     Financial Resource Strain: Not on file   Food Insecurity: Not on file   Transportation Needs: Not on file   Physical Activity: Not on file   Stress: Not on file   Social Connections: Not on file   Intimate Partner Violence: Not on file   Housing Stability: Not on file      Family History:     Family History   Problem Relation Age of Onset    Depression Mother     Migraines Mother     Osteopenia Mother     Aortic aneurysm Father     Hyperlipidemia Father     Hypertension Father     Heart disease Father         ischemic    Thyroid disease Father     Anxiety disorder Brother     Depression Brother     Anxiety disorder Son     ADD / ADHD Son     Diabetes Maternal Grandmother     Stroke Maternal Grandmother     Skin cancer Maternal Grandfather     Stroke Maternal Grandfather     Osteoporosis Paternal Grandmother     Lung cancer Maternal Uncle     Thyroid disease Paternal Uncle     Thyroid disease Cousin     No Known Problems Paternal Grandfather     No Known Problems Paternal Aunt       Current Medications:     Current Outpatient Medications   Medication Sig Dispense Refill    baclofen 10 mg tablet TAKE 2 TABLETS DAILY 180 tablet 0    ergocalciferol (VITAMIN D2) 50,000 units Take 1 capsule (50,000 Units total) by mouth once a week 12 capsule 0    levothyroxine 88 mcg tablet TAKE 1 TABLET DAILY 90 tablet 3    meclizine (ANTIVERT) 25 mg tablet Take 1 tablet (25 mg total) by mouth every 8 (eight) hours as needed for dizziness 100 tablet 0    Multiple Vitamins-Minerals (DAILY MULTI PO) Take by mouth      sertraline (ZOLOFT) 50 mg tablet Take 1 tablet (50 mg total) by mouth daily 90 tablet 0    sulfamethoxazole-trimethoprim (BACTRIM DS) 800-160 mg per tablet Take 1 tablet by mouth every 12 (twelve) hours for 5 days 10 tablet 0 No current facility-administered medications for this visit  Allergies:     No Known Allergies   Physical Exam:     /82 (BP Location: Left arm, Patient Position: Sitting)   Pulse 68   Temp (!) 97 2 °F (36 2 °C) (Tympanic)   Resp 18   Ht 5' 5" (1 651 m)   Wt 83 2 kg (183 lb 6 4 oz)   LMP  (LMP Unknown)   SpO2 97%   BMI 30 52 kg/m²     Physical Exam  Vitals and nursing note reviewed  Constitutional:       General: She is not in acute distress  Appearance: She is well-developed  HENT:      Head: Normocephalic  Right Ear: Tympanic membrane normal       Left Ear: Tympanic membrane normal    Eyes:      Pupils: Pupils are equal, round, and reactive to light  Neck:      Thyroid: No thyromegaly  Vascular: No carotid bruit  Cardiovascular:      Rate and Rhythm: Normal rate and regular rhythm  Heart sounds: Normal heart sounds  No murmur heard  Pulmonary:      Effort: Pulmonary effort is normal       Breath sounds: Normal breath sounds  Abdominal:      General: Bowel sounds are normal       Palpations: Abdomen is soft  Musculoskeletal:      Right lower leg: No edema  Left lower leg: No edema  Lymphadenopathy:      Cervical: No cervical adenopathy  Skin:     General: Skin is warm and dry  Neurological:      Mental Status: She is alert and oriented to person, place, and time     Psychiatric:         Mood and Affect: Mood normal           [facility] [address]

## 2022-03-11 NOTE — PATIENT INSTRUCTIONS

## 2022-03-25 ENCOUNTER — TELEPHONE (OUTPATIENT)
Dept: FAMILY MEDICINE CLINIC | Facility: CLINIC | Age: 52
End: 2022-03-25

## 2022-03-26 ENCOUNTER — OFFICE VISIT (OUTPATIENT)
Dept: URGENT CARE | Facility: MEDICAL CENTER | Age: 52
End: 2022-03-26
Payer: COMMERCIAL

## 2022-03-26 VITALS
HEIGHT: 65 IN | WEIGHT: 182 LBS | DIASTOLIC BLOOD PRESSURE: 69 MMHG | SYSTOLIC BLOOD PRESSURE: 139 MMHG | OXYGEN SATURATION: 97 % | HEART RATE: 77 BPM | RESPIRATION RATE: 20 BRPM | BODY MASS INDEX: 30.32 KG/M2 | TEMPERATURE: 98 F

## 2022-03-26 DIAGNOSIS — R30.0 DYSURIA: Primary | ICD-10-CM

## 2022-03-26 PROCEDURE — 87086 URINE CULTURE/COLONY COUNT: CPT | Performed by: PHYSICIAN ASSISTANT

## 2022-03-26 PROCEDURE — 99213 OFFICE O/P EST LOW 20 MIN: CPT | Performed by: PHYSICIAN ASSISTANT

## 2022-03-26 PROCEDURE — 87077 CULTURE AEROBIC IDENTIFY: CPT | Performed by: PHYSICIAN ASSISTANT

## 2022-03-26 PROCEDURE — 87186 SC STD MICRODIL/AGAR DIL: CPT | Performed by: PHYSICIAN ASSISTANT

## 2022-03-26 RX ORDER — SULFAMETHOXAZOLE AND TRIMETHOPRIM 800; 160 MG/1; MG/1
1 TABLET ORAL EVERY 12 HOURS SCHEDULED
Qty: 10 TABLET | Refills: 0 | Status: SHIPPED | OUTPATIENT
Start: 2022-03-26 | End: 2022-03-31

## 2022-03-26 NOTE — PATIENT INSTRUCTIONS
Urinary Tract Infection in Women   WHAT YOU NEED TO KNOW:   A urinary tract infection (UTI) is caused by bacteria that get inside your urinary tract  Most bacteria that enter your urinary tract come out when you urinate  If the bacteria stay in your urinary tract, you may get an infection  Your urinary tract includes your kidneys, ureters, bladder, and urethra  Urine is made in your kidneys, and it flows from the ureters to the bladder  Urine leaves the bladder through the urethra  A UTI is more common in your lower urinary tract, which includes your bladder and urethra  DISCHARGE INSTRUCTIONS:   Return to the emergency department if:   · You are urinating very little or not at all  · You have a high fever with shaking chills  · You have side or back pain that gets worse  Call your doctor if:   · You have a fever  · You do not feel better after 2 days of taking antibiotics  · You are vomiting  · You have questions or concerns about your condition or care  Medicines:   · Antibiotics  help fight a bacterial infection  If you have UTIs often (called recurrent UTIs), you may be given antibiotics to take regularly  You will be given directions for when and how to use antibiotics  The goal is to prevent UTIs but not cause antibiotic resistance by using antibiotics too often  · Medicines  may be given to decrease pain and burning when you urinate  They will also help decrease the feeling that you need to urinate often  These medicines will make your urine orange or red  · Take your medicine as directed  Contact your healthcare provider if you think your medicine is not helping or if you have side effects  Tell him or her if you are allergic to any medicine  Keep a list of the medicines, vitamins, and herbs you take  Include the amounts, and when and why you take them  Bring the list or the pill bottles to follow-up visits   Carry your medicine list with you in case of an emergency  Follow up with your doctor as directed:  Write down your questions so you remember to ask them during your visits  Prevent another UTI:   · Empty your bladder often  Urinate and empty your bladder as soon as you feel the need  Do not hold your urine for long periods of time  · Wipe from front to back after you urinate or have a bowel movement  This will help prevent germs from getting into your urinary tract through your urethra  · Drink liquids as directed  Ask how much liquid to drink each day and which liquids are best for you  You may need to drink more liquids than usual to help flush out the bacteria  Do not drink alcohol, caffeine, or citrus juices  These can irritate your bladder and increase your symptoms  Your healthcare provider may recommend cranberry juice to help prevent a UTI  · Urinate after you have sex  This can help flush out bacteria passed during sex  · Do not douche or use feminine deodorants  These can change the chemical balance in your vagina  · Change sanitary pads or tampons often  This will help prevent germs from getting into your urinary tract  · Talk to your healthcare provider about your birth control method  You may need to change your method if it is increasing your risk for UTIs  · Wear cotton underwear and clothes that are loose  Tight pants and nylon underwear can trap moisture and cause bacteria to grow  · Vaginal estrogen may be recommended  This medicine helps prevent UTIs in women who have gone through menopause or are in moy-menopause  · Do pelvic muscle exercises often  Pelvic muscle exercises may help you start and stop urinating  Strong pelvic muscles may help you empty your bladder easier  Squeeze these muscles tightly for 5 seconds like you are trying to hold back urine  Then relax for 5 seconds  Gradually work up to squeezing for 10 seconds  Do 3 sets of 15 repetitions a day, or as directed      © Copyright [employer] 2022 Information is for [employer] use only and may not be sold, redistributed or otherwise used for commercial purposes  All illustrations and images included in CareNotes® are the copyrighted property of A D A M , Inc  or Krissy Granados  The above information is an  only  It is not intended as medical advice for individual conditions or treatments  Talk to your doctor, nurse or pharmacist before following any medical regimen to see if it is safe and effective for you

## 2022-03-26 NOTE — PROGRESS NOTES
[facility] [facility]        NAME: Gillian Case is a 46 y o  female  : 1970    MRN: [id number]  DATE: 2022  TIME: 10:58 AM    Assessment and Plan   Dysuria [R30 0]  1  Dysuria  Urine culture    sulfamethoxazole-trimethoprim (BACTRIM DS) 800-160 mg per tablet         Patient Instructions       Follow up with PCP in 3-5 days  Increase fluids and finish antibiotic  Chief Complaint     Chief Complaint   Patient presents with    Possible UTI     Patietn started Thursday witb Urgency, frequency, and burning  She has had recent UTI's and started AZo         History of Present Illness       Patient with 2 day history of urgency frequency and dysuria  She has been on nasal   She had recent UTIs treated with Macrobid  Cultures previously reviewed and sensitive to all agents except ampicillin  Difficulty Urinating   This is a new problem  The problem occurs every urination  The problem has been unchanged  The quality of the pain is described as burning  The pain is mild  There has been no fever  She is sexually active  There is no history of pyelonephritis  Associated symptoms include frequency, hesitancy and urgency  Pertinent negatives include no chills, discharge, flank pain, hematuria, nausea, possible pregnancy, sweats or vomiting  She has tried increased fluids for the symptoms  The treatment provided no relief  Review of Systems   Review of Systems   Constitutional: Negative for chills  Gastrointestinal: Negative for nausea and vomiting  Genitourinary: Positive for dysuria, frequency, hesitancy and urgency  Negative for flank pain and hematuria  All other systems reviewed and are negative          Current Medications       Current Outpatient Medications:     baclofen 10 mg tablet, TAKE 2 TABLETS DAILY, Disp: 180 tablet, Rfl: 0    ergocalciferol (VITAMIN D2) 50,000 units, Take 1 capsule (50,000 Units total) by mouth once a week, Disp: 12 capsule, Rfl: 0    levothyroxine 88 mcg tablet, TAKE 1 TABLET DAILY, Disp: 90 tablet, Rfl: 3    meclizine (ANTIVERT) 25 mg tablet, Take 1 tablet (25 mg total) by mouth every 8 (eight) hours as needed for dizziness, Disp: 100 tablet, Rfl: 0    Multiple Vitamins-Minerals (DAILY MULTI PO), Take by mouth, Disp: , Rfl:     sertraline (ZOLOFT) 50 mg tablet, Take 1 tablet (50 mg total) by mouth daily, Disp: 90 tablet, Rfl: 0    sulfamethoxazole-trimethoprim (BACTRIM DS) 800-160 mg per tablet, Take 1 tablet by mouth every 12 (twelve) hours for 5 days, Disp: 10 tablet, Rfl: 0    Current Allergies     Allergies as of 03/26/2022    (No Known Allergies)            The following portions of the patient's history were reviewed and updated as appropriate: allergies, current medications, past family history, past medical history, past social history, past surgical history and problem list      Past Medical History:   Diagnosis Date    Disease of thyroid gland     Graves disease     Headache(784 0)     Herpes simplex infection     Migraine without aura and responsive to treatment     Postablative hypothyroidism     Varicella without complication        Past Surgical History:   Procedure Laterality Date    HYSTERECTOMY      age 34   [de-identified] MYOMECTOMY      NEUROPLASTY / TRANSPOSITION MEDIAN NERVE AT CARPAL TUNNEL Right     TOOTH EXTRACTION         Family History   Problem Relation Age of Onset    Depression Mother     Migraines Mother     Osteopenia Mother     Aortic aneurysm Father     Hyperlipidemia Father     Hypertension Father     Heart disease Father         ischemic    Thyroid disease Father     Anxiety disorder Brother     Depression Brother     Anxiety disorder Son     ADD / ADHD Son     Diabetes Maternal Grandmother     Stroke Maternal Grandmother     Skin cancer Maternal Grandfather     Stroke Maternal Grandfather     Osteoporosis Paternal Grandmother     Lung cancer Maternal Uncle     Thyroid disease Paternal Uncle     Thyroid disease Cousin     No Known Problems Paternal Grandfather     No Known Problems Paternal Aunt          Medications have been verified  Objective   /69   Pulse 77   Temp 98 °F (36 7 °C)   Resp 20   Ht 5' 5" (1 651 m)   Wt 82 6 kg (182 lb)   LMP  (LMP Unknown)   SpO2 97%   BMI 30 29 kg/m²   No LMP recorded (lmp unknown)  Patient has had a hysterectomy  Physical Exam     Physical Exam  Vitals and nursing note reviewed  Constitutional:       Appearance: Normal appearance  She is normal weight  Cardiovascular:      Rate and Rhythm: Normal rate  Pulses: Normal pulses  Heart sounds: Normal heart sounds  Pulmonary:      Effort: Pulmonary effort is normal       Breath sounds: Normal breath sounds  Abdominal:      Palpations: Abdomen is soft  Tenderness: There is abdominal tenderness (Suprapubic)  There is no right CVA tenderness or left CVA tenderness  Neurological:      Mental Status: She is alert

## 2022-03-28 DIAGNOSIS — E55.9 VITAMIN D DEFICIENCY: ICD-10-CM

## 2022-03-28 LAB
BACTERIA UR CULT: ABNORMAL
BACTERIA UR CULT: ABNORMAL

## 2022-03-28 NOTE — TELEPHONE ENCOUNTER
Pt went to urgent care 3/26 and was positive for   dysuria, frequency, hesitancy and urgency  Negative for flank pain and hematuria  She got medication from them  She wants [facility] to know because she said that they are keeping an eye on it

## 2022-03-29 ENCOUNTER — TELEPHONE (OUTPATIENT)
Dept: FAMILY MEDICINE CLINIC | Facility: CLINIC | Age: 52
End: 2022-03-29

## 2022-03-29 RX ORDER — ERGOCALCIFEROL 1.25 MG/1
50000 CAPSULE ORAL WEEKLY
Qty: 12 CAPSULE | Refills: 0 | Status: SHIPPED | OUTPATIENT
Start: 2022-03-29 | End: 2022-06-27

## 2022-03-29 NOTE — TELEPHONE ENCOUNTER
I spoke to patient  After reading her email, I looked at her chart and saw that her urine culture grew out bacteria that was resistant to trimethoprim sulfamethoxazole which they prescribed in the care now  I did send a new prescription to her pharmacy for amoxicillin clavulanic acid  I told her to stop the trimethoprim sulfamethoxazole  She is having an ultrasound of the abdomen to look at her liver in a few days and I will check with Radiology if we can also add kidneys and bladder  Regarding: Fever Last Night  ----- Message from Clarissa Haskins LPN sent at 1/01/7232  7:47 AM EDT -----       ----- Message from Robinson Cullen to Ramón Williamson DO sent at 3/29/2022  7:33 AM -----   Hi Dr Mee Correia,  I was in urgent care on Saturday for treatment of another UTI with symptoms starting on Thursday  I've been on the antibiotic since Saturday, but last night I spiked a fever of 102 degrees  I took some Tylenol and by morning my temp was almost back to normal at 99 1 degrees  This is the first time I had a UTI with a fever over 99 degrees  Wasn't sure it was actually the UTI that caused the fever or whatever is going on with my liver  I have the ultra sound you order schedule for Sunday so should know more next week  Not thinking there is anything I need to do since the fever is gone but wanted to provide the info for my chart incase the info becomes important later    Thanks [employer]

## 2022-04-03 ENCOUNTER — HOSPITAL ENCOUNTER (OUTPATIENT)
Dept: ULTRASOUND IMAGING | Facility: HOSPITAL | Age: 52
Discharge: HOME | End: 2022-04-03
Payer: COMMERCIAL

## 2022-04-03 DIAGNOSIS — R74.8 ELEVATED LIVER ENZYMES: ICD-10-CM

## 2022-04-03 PROCEDURE — 76700 US EXAM ABDOM COMPLETE: CPT

## 2022-04-12 ENCOUNTER — TELEPHONE (OUTPATIENT)
Dept: FAMILY MEDICINE CLINIC | Facility: CLINIC | Age: 52
End: 2022-04-12

## 2022-04-12 NOTE — TELEPHONE ENCOUNTER
I spoke to patient  I sent a prescription for amoxicillin clavulanic acid to the pharmacy  She tells me she is not able to come into the office is evening to give a urine specimen  I explained that it would be better to have a urine culture so that we can see if the same bacteria is growing out as her previous infection or if there is resistance to certain antibiotics  She says she understands this but truly is unable to come to the office  I asked her to go to the lab about 10 days after starting the antibiotic so we can check a urinalysis and urine culture to make sure everything is clear  Regarding her ultrasound- discussed kidney - I do not think the ?double collecting system/ Basilio has anything to do with her infections  She also has fatty liver and abnormal liver function - will refer to GI  Regarding: Ultrasound Results  Good Morning  [employer]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 in [address] [place] [address], so I can get a dose or two in me before tomorrow? Thanks!

## 2022-04-19 ENCOUNTER — TELEPHONE (OUTPATIENT)
Dept: NEUROLOGY | Facility: CLINIC | Age: 52
End: 2022-04-19

## 2022-04-20 ENCOUNTER — APPOINTMENT (OUTPATIENT)
Dept: LAB | Facility: CLINIC | Age: 52
End: 2022-04-20
Payer: COMMERCIAL

## 2022-04-20 DIAGNOSIS — R30.0 DYSURIA: ICD-10-CM

## 2022-04-20 LAB
BILIRUB UR QL STRIP: NEGATIVE
CLARITY UR: CLEAR
COLOR UR: NORMAL
GLUCOSE UR STRIP-MCNC: NEGATIVE MG/DL
HGB UR QL STRIP.AUTO: NEGATIVE
KETONES UR STRIP-MCNC: NEGATIVE MG/DL
LEUKOCYTE ESTERASE UR QL STRIP: NEGATIVE
NITRITE UR QL STRIP: NEGATIVE
PH UR STRIP.AUTO: 5 [PH]
PROT UR STRIP-MCNC: NEGATIVE MG/DL
SP GR UR STRIP.AUTO: 1.01 (ref 1–1.03)
UROBILINOGEN UR STRIP-ACNC: <2 MG/DL

## 2022-04-20 PROCEDURE — 81003 URINALYSIS AUTO W/O SCOPE: CPT

## 2022-04-20 PROCEDURE — 87086 URINE CULTURE/COLONY COUNT: CPT

## 2022-04-21 LAB — BACTERIA UR CULT: NORMAL

## 2022-04-24 DIAGNOSIS — F41.9 ANXIETY: ICD-10-CM

## 2022-04-27 ENCOUNTER — HOSPITAL ENCOUNTER (OUTPATIENT)
Dept: MAMMOGRAPHY | Facility: CLINIC | Age: 52
Discharge: HOME | End: 2022-04-27
Payer: COMMERCIAL

## 2022-04-27 VITALS — HEIGHT: 65 IN | WEIGHT: 182 LBS | BODY MASS INDEX: 30.32 KG/M2

## 2022-04-27 DIAGNOSIS — Z12.31 BREAST CANCER SCREENING BY MAMMOGRAM: ICD-10-CM

## 2022-04-27 PROCEDURE — 77067 SCR MAMMO BI INCL CAD: CPT

## 2022-04-27 PROCEDURE — 77063 BREAST TOMOSYNTHESIS BI: CPT

## 2022-04-29 ENCOUNTER — OFFICE VISIT (OUTPATIENT)
Dept: NEUROLOGY | Facility: CLINIC | Age: 52
End: 2022-04-29
Payer: COMMERCIAL

## 2022-04-29 VITALS
BODY MASS INDEX: 31.02 KG/M2 | HEIGHT: 65 IN | TEMPERATURE: 97.2 F | SYSTOLIC BLOOD PRESSURE: 118 MMHG | WEIGHT: 186.2 LBS | RESPIRATION RATE: 18 BRPM | DIASTOLIC BLOOD PRESSURE: 78 MMHG | HEART RATE: 69 BPM

## 2022-04-29 DIAGNOSIS — G44.209 TENSION HEADACHE: ICD-10-CM

## 2022-04-29 DIAGNOSIS — R51.9 PERSISTENT HEADACHES: ICD-10-CM

## 2022-04-29 DIAGNOSIS — R25.3 MUSCLE TWITCHING: Primary | ICD-10-CM

## 2022-04-29 PROCEDURE — 3008F BODY MASS INDEX DOCD: CPT | Performed by: FAMILY MEDICINE

## 2022-04-29 PROCEDURE — 99214 OFFICE O/P EST MOD 30 MIN: CPT | Performed by: PHYSICIAN ASSISTANT

## 2022-04-29 RX ORDER — BACLOFEN 10 MG/1
TABLET ORAL
Qty: 180 TABLET | Refills: 1 | Status: SHIPPED | OUTPATIENT
Start: 2022-04-29

## 2022-04-29 NOTE — PROGRESS NOTES
Patient ID: Francoise Fontana is a 46 y o  female  Assessment/Plan:    Muscle twitching  Patient with intermittent muscle twitching in all of the extremities, usually worse when she is trying to fall asleep or relaxing her body   EMG was normal with no evidence to support a generalized neurogenic or myopathic process      No evidence of atrophy, fasciculations or weakness on exam     We have discussed this is likely benign fasciculation syndrome   Reassurance given that her neurologic workup is unremarkable for a more concerning neuromuscular disorder      Encouraged the patient to stay hydrated, get proper rest, limit caffeine/stimulants       Tension headache  Well controlled currently  She is taking baclofen 20mg daily, and using magnesium and B2 supplements regularly  She uses Excedrin at onset of HA if needed  Reminded patient to limit use of OTC NSAIDs/tylenol to no more than 2-3 times a week to prevent rebound HA  Encouraged maintaining proper hydration, eating regularly/not skipping meals and getting adequate sleep  Patient will follow up in 6 months or sooner if needed  She was advised to call for any new or worsening symptoms  Diagnoses and all orders for this visit:    Muscle twitching    Tension headache    Persistent headaches  -     baclofen 10 mg tablet; TAKE 2 TABLETS DAILY           Subjective:    HPI    Patient is a 46year old right handed female who presents today for neurologic follow up  She was last seen in October 2021  She was initially evaluated by Dr Pearlie Peabody on 6/15/2020 for several issues  Patient described that for the past 8-9 months she had been experiencing twitching  She described twitching which generally occurs proximally in any of the 4 extremities although occasionally will occur in the hands when she is working on the computer  She stated this was particularly apparent when resting  She had no associated weakness or sensory symptoms   No symptoms to suggest a Lhermitte's phenomenon  No change in bowel or bladder function, speech or swallowing  In reviewing past studies, she was found to be vitamin-D deficient  She was put on a supplement  Her father has Parkinson's disease  However, she is unaware of any family member with neuro muscular disease  She also described headaches  These date back a long time were getting worse over the past 8-9 months as well  These are described as generally right frontoparietal in location  She describes these predominantly as tightness, with rare modest pulsatile component  These have been occurring in the recent months on average of every other day and she does take Excedrin 2 tablets generally every other day, suggesting the possibility analgesic overuse effect as well  Labs completed 8/13/2020 with normal CK, slightly low aldolase, slightly elevated TSH at 4 25 with normal FT4 1 12, normal magnesium, low vit D (29), normal folate, normal ionized calcium, essentially unremarkable CBC and CMP  EMG of the LUE/LLE completed 10/14/2020 by Dr Ana Laura Felix was a normal study with no evidence of a generalized neurogenic or myopathic process effecting the peripheral nervous system  At timing of last visit in Feb 2021 she reported some issues with vertigo  She has a history of BPPV, but more recently she experienced some episodes of vertigo associated with head pressure that seemed to be brought on by stress  Does not occur with head movement  Also with some imbalance  MRI brain with IACs w and wo contrast was completed 4/14/2021  Normal appearance of the IACs  Scattered FLAIR and T2 foci are seen most compatible with chronic microangiopathic changes in patients of this age group  Today, patient reports she is doing well  The twitching is the same, no changes  Headaches are also stable, has a few per month, mild, and relieved with Excedrin    She is taking B2 and magnesium consistently and feels this has helped reduce her headache frequency and severity  She denies any new neurologic symptoms including no vision changes, speech or swallowing difficulty, bowel changes  In regards to her bladder, she has had frequent UTIs over the last several months and will be referred to urology by her PCP  She has no difficulty urinating, no incontinence  The following portions of the patient's history were reviewed and updated as appropriate: current medications, past family history, past medical history, past social history, past surgical history and problem list          Objective:    Blood pressure 118/78, pulse 69, temperature (!) 97 2 °F (36 2 °C), temperature source Tympanic, resp  rate 18, height 5' 5" (1 651 m), weight 84 5 kg (186 lb 3 2 oz)    Physical Exam  Constitutional:       Appearance: Normal appearance  HENT:      Head: Normocephalic and atraumatic  Eyes:      Extraocular Movements: EOM normal       Pupils: Pupils are equal, round, and reactive to light  Neurological:      Mental Status: She is alert  Coordination: Coordination is intact  Deep Tendon Reflexes: Strength normal and reflexes are normal and symmetric  Psychiatric:         Mood and Affect: Mood normal          Speech: Speech normal          Behavior: Behavior normal          Neurological Exam  Mental Status  Alert  Oriented to person, place, time and situation  Speech is normal  Language is fluent with no aphasia  Attention and concentration are normal     Cranial Nerves  CN II: Visual fields full to confrontation  CN III, IV, VI: Extraocular movements intact bilaterally  Pupils equal round and reactive to light bilaterally  CN V: Facial sensation is normal   CN VII: Full and symmetric facial movement  CN VIII: Hearing is normal   CN IX, X: Palate elevates symmetrically  CN XI: Shoulder shrug strength is normal   CN XII: Tongue midline without atrophy or fasciculations  Motor   Normal muscle tone   Strength is 5/5 throughout all four extremities  Sensory  Light touch is normal in upper and lower extremities  Reflexes  Deep tendon reflexes are 2+ and symmetric in all four extremities with downgoing toes bilaterally  Coordination  Finger-to-nose, rapid alternating movements and heel-to-shin normal bilaterally without dysmetria  Gait  Casual gait is normal including stance, stride, and arm swing  ROS:    Review of Systems   Constitutional: Negative for chills and fever  HENT: Negative for ear pain and sore throat  Eyes: Negative for pain and visual disturbance  Respiratory: Negative for cough and shortness of breath  Cardiovascular: Negative for chest pain and palpitations  Gastrointestinal: Negative for abdominal pain and vomiting  Genitourinary: Negative for dysuria and hematuria  Musculoskeletal: Positive for back pain (lower back)  Negative for arthralgias  Skin: Negative for color change and rash  Neurological: Negative for seizures and syncope  All other systems reviewed and are negative      I personally reviewed and updated the ROS as appropriate

## 2022-04-29 NOTE — ASSESSMENT & PLAN NOTE
Well controlled currently  She is taking baclofen 20mg daily, and using magnesium and B2 supplements regularly  She uses Excedrin at onset of HA if needed  Reminded patient to limit use of OTC NSAIDs/tylenol to no more than 2-3 times a week to prevent rebound HA  Encouraged maintaining proper hydration, eating regularly/not skipping meals and getting adequate sleep

## 2022-04-29 NOTE — PATIENT INSTRUCTIONS
Continue magnesium and B2, along with baclofen 10mg two per day  Continue to stay hydrated, limiting caffeine and trying to do regular exercise and stretching  Make sure you are getting adequate sleep  Follow up in 6 months or sooner if needed

## 2022-04-29 NOTE — ASSESSMENT & PLAN NOTE
Patient with intermittent muscle twitching in all of the extremities, usually worse when she is trying to fall asleep or relaxing her body   EMG was normal with no evidence to support a generalized neurogenic or myopathic process      No evidence of atrophy, fasciculations or weakness on exam     We have discussed this is likely benign fasciculation syndrome   Reassurance given that her neurologic workup is unremarkable for a more concerning neuromuscular disorder      Encouraged the patient to stay hydrated, get proper rest, limit caffeine/stimulants

## 2022-06-26 DIAGNOSIS — E55.9 VITAMIN D DEFICIENCY: ICD-10-CM

## 2022-06-27 RX ORDER — ERGOCALCIFEROL 1.25 MG/1
CAPSULE ORAL
Qty: 12 CAPSULE | Refills: 3 | Status: SHIPPED | OUTPATIENT
Start: 2022-06-27

## 2022-09-06 ENCOUNTER — OFFICE VISIT (OUTPATIENT)
Dept: FAMILY MEDICINE CLINIC | Facility: CLINIC | Age: 52
End: 2022-09-06
Payer: COMMERCIAL

## 2022-09-06 VITALS
HEART RATE: 67 BPM | TEMPERATURE: 98.9 F | SYSTOLIC BLOOD PRESSURE: 120 MMHG | HEIGHT: 66 IN | DIASTOLIC BLOOD PRESSURE: 86 MMHG | WEIGHT: 188 LBS | BODY MASS INDEX: 30.22 KG/M2 | OXYGEN SATURATION: 97 %
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
## 2022-09-06 DIAGNOSIS — L03.115 CELLULITIS OF RIGHT LOWER EXTREMITY: Primary | ICD-10-CM

## 2022-09-06 DIAGNOSIS — S80.261A INSECT BITE OF RIGHT KNEE, INITIAL ENCOUNTER: ICD-10-CM

## 2022-09-06 PROCEDURE — 87252 VIRUS INOCULATION TISSUE: CPT | Performed by: FAMILY MEDICINE

## 2022-09-06 PROCEDURE — 99213 OFFICE O/P EST LOW 20 MIN: CPT | Performed by: FAMILY MEDICINE

## 2022-09-06 PROCEDURE — 3725F SCREEN DEPRESSION PERFORMED: CPT | Performed by: FAMILY MEDICINE

## 2022-09-06 PROCEDURE — 87205 SMEAR GRAM STAIN: CPT | Performed by: FAMILY MEDICINE

## 2022-09-06 PROCEDURE — 87070 CULTURE OTHR SPECIMN AEROBIC: CPT | Performed by: FAMILY MEDICINE

## 2022-09-06 RX ORDER — CEPHALEXIN 500 MG/1
500 CAPSULE ORAL EVERY 8 HOURS SCHEDULED
Qty: 21 CAPSULE | Refills: 0 | Status: SHIPPED | OUTPATIENT
Start: 2022-09-06 | End: 2022-09-13

## 2022-09-06 NOTE — PROGRESS NOTES
Assessment/Plan:    Patient is 49-year-old female with wound on her right knee area  There was a 3 mm reddish/black area which I did remove  I was undecided whether this was dried blood verses part of an insect  There is erythema around the area  There was also a vesicle close to this area with a clear to whitish head  I did do a bacterial culture of the wound and a viral culture old of they vesicular area  I started her on  Cephalexin for cellulitis  We will see will cultures reveal    Diagnoses and all orders for this visit:    Cellulitis of right lower extremity  -     cephalexin (KEFLEX) 500 mg capsule; Take 1 capsule (500 mg total) by mouth every 8 (eight) hours for 7 days  -     Wound culture and Gram stain  -     Cancel: Virus culture  -     Virus culture    Insect bite of right knee, initial encounter  -     cephalexin (KEFLEX) 500 mg capsule; Take 1 capsule (500 mg total) by mouth every 8 (eight) hours for 7 days  -     Wound culture and Gram stain        Subjective:   Chief Complaint   Patient presents with    Skin Problem     Possible rash or bug bite on right leg upper near knee  Noticed it Saturday night, has been itchy        Patient ID: Giovana Diggs is a 46 y o  female  Patient with skin irritation on right leg - not sure if it is an insect bite or a skin lesion  Noticed "bite" about three days ago  Did find a spider in her blanket  The following portions of the patient's history were reviewed and updated as appropriate: allergies, current medications, past family history, past medical history, past social history, past surgical history and problem list     Review of Systems   Constitutional: Negative for chills and fever  Skin: Positive for wound           Objective:      /86 (BP Location: Right arm, Patient Position: Sitting, Cuff Size: Adult)   Pulse 67   Temp 98 9 °F (37 2 °C)   Ht 5' 5 5" (1 664 m)   Wt 85 3 kg (188 lb)   LMP  (LMP Unknown)   SpO2 97% BMI 30 81 kg/m²          Physical Exam  Vitals and nursing note reviewed  Constitutional:       General: She is not in acute distress  HENT:      Head: Normocephalic  Skin:     General: Skin is warm and dry  Comments: Area with dark discoloration on right knee area - scab vs remnant of insect vs dried blood  Then close to area was a vesicle with almost clear head  Neurological:      Mental Status: She is alert and oriented to person, place, and time     Psychiatric:         Mood and Affect: Mood normal

## 2022-09-10 LAB
BACTERIA WND AEROBE CULT: NORMAL
GRAM STN SPEC: NORMAL

## 2022-10-14 ENCOUNTER — TELEPHONE (OUTPATIENT)
Dept: NEUROLOGY | Facility: CLINIC | Age: 52
End: 2022-10-14

## 2022-10-14 NOTE — TELEPHONE ENCOUNTER
LMOM to remind pt of upcoming appt on 10/28/22  left the office number for any questions or concerns

## 2022-10-28 ENCOUNTER — APPOINTMENT (OUTPATIENT)
Dept: LAB | Facility: CLINIC | Age: 52
End: 2022-10-28
Payer: COMMERCIAL

## 2022-10-28 DIAGNOSIS — R39.9 URINARY TRACT INFECTION SYMPTOMS: ICD-10-CM

## 2022-10-28 LAB
BACTERIA UR QL AUTO: ABNORMAL /HPF
BILIRUB UR QL STRIP: NEGATIVE
CAOX CRY URNS QL MICRO: ABNORMAL /HPF
CLARITY UR: CLEAR
COLOR UR: YELLOW
GLUCOSE UR STRIP-MCNC: NEGATIVE MG/DL
HGB UR QL STRIP.AUTO: NEGATIVE
KETONES UR STRIP-MCNC: NEGATIVE MG/DL
LEUKOCYTE ESTERASE UR QL STRIP: ABNORMAL
MUCOUS THREADS UR QL AUTO: ABNORMAL
NITRITE UR QL STRIP: NEGATIVE
NON-SQ EPI CELLS URNS QL MICRO: ABNORMAL /HPF
PH UR STRIP.AUTO: 6 [PH]
PROT UR STRIP-MCNC: ABNORMAL MG/DL
RBC #/AREA URNS AUTO: ABNORMAL /HPF
SP GR UR STRIP.AUTO: 1.03 (ref 1–1.03)
UROBILINOGEN UR STRIP-ACNC: <2 MG/DL
WBC #/AREA URNS AUTO: ABNORMAL /HPF

## 2022-10-28 PROCEDURE — 81001 URINALYSIS AUTO W/SCOPE: CPT

## 2022-10-29 DIAGNOSIS — R30.0 DYSURIA: Primary | ICD-10-CM

## 2022-10-29 RX ORDER — SULFAMETHOXAZOLE AND TRIMETHOPRIM 800; 160 MG/1; MG/1
1 TABLET ORAL EVERY 12 HOURS SCHEDULED
Qty: 10 TABLET | Refills: 0 | Status: SHIPPED | OUTPATIENT
Start: 2022-10-29 | End: 2022-10-31 | Stop reason: ALTCHOICE

## 2022-10-31 ENCOUNTER — OFFICE VISIT (OUTPATIENT)
Dept: FAMILY MEDICINE CLINIC | Facility: CLINIC | Age: 52
End: 2022-10-31

## 2022-10-31 ENCOUNTER — TELEPHONE (OUTPATIENT)
Dept: FAMILY MEDICINE CLINIC | Facility: CLINIC | Age: 52
End: 2022-10-31

## 2022-10-31 ENCOUNTER — HOSPITAL ENCOUNTER (OUTPATIENT)
Dept: CT IMAGING | Facility: HOSPITAL | Age: 52
Discharge: HOME | End: 2022-10-31

## 2022-10-31 VITALS
TEMPERATURE: 98.7 F | SYSTOLIC BLOOD PRESSURE: 142 MMHG | DIASTOLIC BLOOD PRESSURE: 82 MMHG | BODY MASS INDEX: 30.48 KG/M2 | WEIGHT: 186 LBS | OXYGEN SATURATION: 97 % | HEART RATE: 89 BPM

## 2022-10-31 DIAGNOSIS — R82.998 CALCIUM OXALATE CRYSTALS IN URINE: ICD-10-CM

## 2022-10-31 DIAGNOSIS — R31.29 OTHER MICROSCOPIC HEMATURIA: ICD-10-CM

## 2022-10-31 DIAGNOSIS — R82.998 CALCIUM OXALATE CRYSTALS IN URINE: Primary | ICD-10-CM

## 2022-10-31 DIAGNOSIS — R10.9 FLANK PAIN: ICD-10-CM

## 2022-10-31 DIAGNOSIS — R30.0 DYSURIA: ICD-10-CM

## 2022-10-31 LAB
SL AMB  POCT GLUCOSE, UA: ABNORMAL
SL AMB LEUKOCYTE ESTERASE,UA: ABNORMAL
SL AMB POCT BILIRUBIN,UA: ABNORMAL
SL AMB POCT BLOOD,UA: ABNORMAL
SL AMB POCT CLARITY,UA: CLEAR
SL AMB POCT COLOR,UA: ABNORMAL
SL AMB POCT KETONES,UA: ABNORMAL
SL AMB POCT NITRITE,UA: ABNORMAL
SL AMB POCT PH,UA: 5.5
SL AMB POCT SPECIFIC GRAVITY,UA: 1.03
SL AMB POCT URINE PROTEIN: ABNORMAL
SL AMB POCT UROBILINOGEN: 1

## 2022-10-31 RX ORDER — NITROFURANTOIN 25; 75 MG/1; MG/1
100 CAPSULE ORAL 2 TIMES DAILY
Qty: 10 CAPSULE | Refills: 0 | Status: SHIPPED | OUTPATIENT
Start: 2022-10-31 | End: 2022-11-05

## 2022-10-31 NOTE — PROGRESS NOTES
Name: Vernell Solano      : 1970      MRN: [id number]  Encounter Provider: Colton Patterson DO  Encounter Date: 10/31/2022   Encounter department: [address] [place] [address]     Patient is 22-year-old female with symptoms for over week of dysuria, back pain, fever and chills  She has been taking trimethoprim sulfamethoxazole for 2 days and has had no improvement  She did give a UA at the lab on Friday which showed white blood cells and calcium oxalate crystals  A dip in the office today  Was positive  I ordered a stat CT to rule out kidney stones and switched her antibiotic to nitrofurantoin  She should stop the trimethoprim sulfamethoxazole  1  Calcium oxalate crystals in urine  -     CT renal stone study abdomen pelvis wo contrast; Future; Expected date: 10/31/2022  -     Urine culture    2  Flank pain  -     CT renal stone study abdomen pelvis wo contrast; Future; Expected date: 10/31/2022  -     POCT urine dip auto non-scope    3  Other microscopic hematuria  -     CT renal stone study abdomen pelvis wo contrast; Future; Expected date: 10/31/2022  -     Urine culture    4  Dysuria  -     nitrofurantoin (MACROBID) 100 mg capsule; Take 1 capsule (100 mg total) by mouth 2 (two) times a day for 5 days  -     POCT urine dip auto non-scope       Subjective      Chief Complaint   Patient presents with   • Urinary Tract Infection     Had for a week  Patient with urgency, fevers off and on, increased thirst  Also with nausea, loss of appetite, fatigue  Always has lower back pain  Review of Systems   Constitutional: Positive for chills and fever  Genitourinary: Positive for dysuria and urgency  Musculoskeletal: Positive for back pain         Current Outpatient Medications on File Prior to Visit   Medication Sig   • baclofen 10 mg tablet TAKE 2 TABLETS DAILY   • ergocalciferol (VITAMIN D2) 50,000 units TAKE 1 CAPSULE ONCE A WEEK   • levothyroxine 88 mcg tablet TAKE 1 TABLET DAILY   • meclizine (ANTIVERT) 25 mg tablet Take 1 tablet (25 mg total) by mouth every 8 (eight) hours as needed for dizziness   • Multiple Vitamins-Minerals (DAILY MULTI PO) Take by mouth   • sertraline (ZOLOFT) 50 mg tablet TAKE 1 TABLET DAILY   • [DISCONTINUED] sulfamethoxazole-trimethoprim (BACTRIM DS) 800-160 mg per tablet Take 1 tablet by mouth every 12 (twelve) hours for 5 days       Objective     /82   Pulse 89   Temp 98 7 °F (37 1 °C) (Tympanic)   Wt 84 4 kg (186 lb)   LMP  (LMP Unknown)   SpO2 97%   BMI 30 48 kg/m²     Physical Exam  Vitals and nursing note reviewed  HENT:      Head: Normocephalic  Cardiovascular:      Rate and Rhythm: Normal rate and regular rhythm  Heart sounds: Normal heart sounds  Pulmonary:      Breath sounds: Normal breath sounds  Abdominal:      General: Abdomen is flat  Palpations: Abdomen is soft  Tenderness: There is no abdominal tenderness  There is right CVA tenderness and left CVA tenderness  Musculoskeletal:      Right lower leg: No edema  Left lower leg: No edema  Skin:     General: Skin is warm and dry  Neurological:      Mental Status: She is alert and oriented to person, place, and time     Psychiatric:         Mood and Affect: Mood normal        Marjorie Philippe DO

## 2022-11-02 LAB — BACTERIA UR CULT: NORMAL

## 2022-11-06 DIAGNOSIS — R51.9 PERSISTENT HEADACHES: ICD-10-CM

## 2022-11-07 ENCOUNTER — CONSULT (OUTPATIENT)
Dept: GASTROENTEROLOGY | Facility: CLINIC | Age: 52
End: 2022-11-07

## 2022-11-07 VITALS
DIASTOLIC BLOOD PRESSURE: 80 MMHG | BODY MASS INDEX: 30.18 KG/M2 | SYSTOLIC BLOOD PRESSURE: 130 MMHG | TEMPERATURE: 98.1 F | WEIGHT: 187.8 LBS | HEIGHT: 66 IN

## 2022-11-07 DIAGNOSIS — K21.9 GASTROESOPHAGEAL REFLUX DISEASE WITHOUT ESOPHAGITIS: Primary | ICD-10-CM

## 2022-11-07 DIAGNOSIS — K76.0 FATTY LIVER: ICD-10-CM

## 2022-11-07 DIAGNOSIS — R19.4 CHANGE IN BOWEL HABIT: ICD-10-CM

## 2022-11-07 DIAGNOSIS — E66.9 OBESITY, UNSPECIFIED CLASSIFICATION, UNSPECIFIED OBESITY TYPE, UNSPECIFIED WHETHER SERIOUS COMORBIDITY PRESENT: ICD-10-CM

## 2022-11-07 DIAGNOSIS — R79.89 ELEVATED LIVER FUNCTION TESTS: ICD-10-CM

## 2022-11-07 RX ORDER — BACLOFEN 10 MG/1
TABLET ORAL
Qty: 180 TABLET | Refills: 3 | Status: SHIPPED | OUTPATIENT
Start: 2022-11-07

## 2022-11-07 RX ORDER — PEG-3350, SODIUM SULFATE, SODIUM CHLORIDE, POTASSIUM CHLORIDE, SODIUM ASCORBATE AND ASCORBIC ACID 7.5-2.691G
4000 KIT ORAL ONCE
Qty: 4000 ML | Refills: 0 | Status: SHIPPED | OUTPATIENT
Start: 2022-11-07 | End: 2022-11-07

## 2022-11-07 RX ORDER — OMEPRAZOLE 20 MG/1
20 CAPSULE, DELAYED RELEASE ORAL DAILY
Qty: 30 CAPSULE | Refills: 3 | Status: SHIPPED | OUTPATIENT
Start: 2022-11-07

## 2022-11-07 NOTE — PATIENT INSTRUCTIONS
Scheduled date of EGD/colonoscopy (as of today):01 09 23  Physician performing EGD/colonoscopy:DR KNIGHT  Location of EGD/colonoscopy:[facility]  Desired bowel prep reviewed with patient:CLEN[facility]Q  Instructions reviewed with patient by:MARCO A  Clearances:   N/A

## 2022-11-07 NOTE — PROGRESS NOTES
[facility] - Outpatient Consultation  Yajaira Calles 45 y o  female MRN: [id number]  Encounter: [id number]          ASSESSMENT AND PLAN:    Yajaira Calles is a 45 y o  female with PMH of obesity, headaches, anxiety presenting for elevated liver chemistries, reflux, and change in bowel habits  GERD  - In s/o small hiatal hernia and worsening obesity prior to onset of sx  - Trial omeprazole 20 mg daily, counseled on proper timing of medication  - Counseled on weight loss  - In setting of bloating, change in bowel habits, and hiatal hernia will plan for EGD with gastric and duodenal biopsies at the same time as colonoscopy as below    Change in Bowel Habits, Bloating  - Given age, no prior colonoscopy and no clear trigger for sx, will plan for colonoscopy for evaluation  - Celiac disease panel  - EGD as above    Elevated Liver Chemistries  - Most likely in setting of hepatic steatosis as noted on abdominal ultrasound from this year  - FIB4 and NAFLD fibrosis score suggest low likelihood of fibrotic liver disease based on February LFTs  - Repeat LFTs now, check acute viral hepatitis panel as well    Obesity  - Suspect worsening obesity is related to development of reflux sx as above, and hepatic lab abnormalities as well  - Counseled on weight loss  - Referred to [facility] weight management program      1  Gastroesophageal reflux disease without esophagitis    2  Fatty liver    3  Elevated liver function tests    4  Change in bowel habit    5   Obesity, unspecified classification, unspecified obesity type, unspecified whether serious comorbidity present      Orders Placed This Encounter   Procedures   • Hepatic function panel   • Hepatitis panel, acute   • Celiac Disease Panel   • Ambulatory Referral to Weight Management   • Colonoscopy   • EGD     ______________________________________________________________________    HPI:    Yajaira Calles is a 45 y o  female with PMH of obesity, headaches, anxiety presenting for elevated liver chemistries, reflux, and change in bowel habits  Liver chemistry abnormalities first noted on CMP in February of this year, with alk phos 128, AST 49, ALT 74  T Bili normal, no abdominal pain  Rechecked later that month with a D bili as well, persistent elevations in alk phos to 138, AST to 54, ALT to 100  T bili and D bili normal  Subsequent abdominal ultrasound noted hepatic steatosis  Pt has no hx of liver disease in the family, denies alcohol use, tobacco use, prior hx of IVDU  No new medications prior to change in liver chemistries  Reflux noted as well, started within the last 6-8 months  Prior to this had only ever noted reflux sx during her pregnancy  Corresponds to time period when she had gained more weight  Sx worse when laying down, not triggered by any specific foods as far as she is aware  No nocturnal sx, no emesis  Had a CT recently as part of workup for frequent UTIs which showed small hiatal hernia  Bowel habits prior to several months ago were very regular - went once a day with a formed stool  Now she is more irregular, sometimes going multiple times per day and sometimes not at all  Not associated with any abdominal pain, blood in stool, or weight loss  Corresponds to change in dietary habits with less dietary fiber intake noted  Had a noninvasive stool based CRC assay in 2020 (hannah, negative)  Has never had a colonoscopy  Sx are associated with bloating as well  REVIEW OF SYSTEMS:    CONSTITUTIONAL: Denies any fever, chills, rigors, and weight loss  HEENT: No earache or tinnitus  Denies hearing loss or visual disturbances  CARDIOVASCULAR: No chest pain or palpitations  RESPIRATORY: Denies any cough, hemoptysis, shortness of breath or dyspnea on exertion  GASTROINTESTINAL: As noted in the History of Present Illness  GENITOURINARY: No problems with urination  Denies any hematuria or dysuria    NEUROLOGIC: No dizziness or vertigo, denies headaches  MUSCULOSKELETAL: Denies any muscle or joint pain  SKIN: Denies skin rashes or itching  ENDOCRINE: Denies excessive thirst  Denies intolerance to heat or cold  PSYCHOSOCIAL: Denies depression or anxiety  Denies any recent memory loss         Historical Information   Past Medical History:   Diagnosis Date   • Disease of thyroid gland    • Graves disease    • Headache(784 0)    • Herpes simplex infection    • Migraine without aura and responsive to treatment    • Postablative hypothyroidism    • Varicella without complication      Past Surgical History:   Procedure Laterality Date   • HYSTERECTOMY      age 34   • MYOMECTOMY     • NEUROPLASTY / TRANSPOSITION MEDIAN NERVE AT CARPAL TUNNEL Right    • TOOTH EXTRACTION       Social History   Social History     Substance and Sexual Activity   Alcohol Use Yes    Comment: drink a few glasses of wine on special occasions     Social History     Substance and Sexual Activity   Drug Use No     Social History     Tobacco Use   Smoking Status Never Smoker   Smokeless Tobacco Never Used     Family History   Problem Relation Age of Onset   • Depression Mother    • Migraines Mother    • Osteopenia Mother    • Aortic aneurysm Father    • Hyperlipidemia Father    • Hypertension Father    • Heart disease Father         ischemic   • Thyroid disease Father    • Stroke Father    • Anxiety disorder Brother    • Depression Brother    • Anxiety disorder Son    • ADD / ADHD Son    • Diabetes Maternal Grandmother    • Stroke Maternal Grandmother    • Skin cancer Maternal Grandfather         80's or 80's   • Stroke Maternal Grandfather    • Osteoporosis Paternal Grandmother    • No Known Problems Paternal Grandfather    • Lung cancer Maternal Uncle         70's   • Skin cancer Maternal Uncle         70's   • No Known Problems Paternal Aunt    • Thyroid disease Paternal Uncle    • Thyroid disease Cousin        Meds/Allergies       Current Outpatient Medications:   •  baclofen 10 mg tablet  •  ergocalciferol (VITAMIN D2) 50,000 units  •  levothyroxine 88 mcg tablet  •  meclizine (ANTIVERT) 25 mg tablet  •  Multiple Vitamins-Minerals (DAILY MULTI PO)  •  sertraline (ZOLOFT) 50 mg tablet    No Known Allergies        Objective     Blood pressure 130/80, temperature 98 1 °F (36 7 °C), temperature source Tympanic, height 5' 5 5" (1 664 m), weight 85 2 kg (187 lb 12 8 oz), not currently breastfeeding  Body mass index is 30 78 kg/m²  Wt Readings from Last 3 Encounters:   11/07/22 85 2 kg (187 lb 12 8 oz)   10/31/22 84 4 kg (186 lb)   09/06/22 85 3 kg (188 lb)        PHYSICAL EXAM:      General Appearance:   Alert, cooperative, no distress   HEENT:   Normocephalic, atraumatic, anicteric  Neck:  Supple, symmetrical, trachea midline   Lungs:   Clear to auscultation bilaterally; no rales, rhonchi or wheezing; respirations unlabored    Heart[de-identified]   Regular rate and rhythm; no murmur, rub, or gallop     Abdomen:   Soft, non-tender, non-distended; normal bowel sounds; no masses, no organomegaly    Genitalia:   Deferred    Rectal:   Deferred    Extremities:  No cyanosis, clubbing or edema    Pulses:  2+ and symmetric    Skin:  No jaundice, rashes, or lesions    Lymph nodes:  No palpable cervical lymphadenopathy        Lab Results:     Fibrosis-4 (FIB-4) Score is: 1 05   NAFLD Fibrosis Score is: -2 318    NAFLD Score Correlated Fibrosis Severity   <-1 455 F0-F2   -1 455-0 676 Indeterminate Score   >0 676 F3-F4   **Fibrosis Severity Scale: F0 = no fibrosis; F1= mild fibrosis; F2 = moderate fibrosis; F3 = severe fibrosis; F4 = cirrhosis    NAFLD Score Component Values:  Component Value Date   Age: 45 y o      BMI: 30 78 kg/m²    IFG or DM: No    AST: 54 U/L 2/26/2022   ALT: 100 U/L 2/26/2022   Platelet: 376 Thousands/uL 2/5/2022   Albumin: 3 7 g/dL 2/26/2022         Indication for testing Absence of advanced fibrosis Presence of advanced fibrosis Indeterminate result   NAFLD <1 30 >2 67 1 30-2 67   Hepatitis C <1 45 >3 25 1 45-3 25   Hepatitis B <1 00 >2 65 1 00-2 65     FIB-4 Score Component Values:  Component Value Date   Age: 45 y o  AST: 54 U/L 2/26/2022   Platelet: 870 Thousands/uL 2/5/2022   ALT: 100 U/L 2/26/2022           Lab Units 02/05/22  0917   SODIUM mmol/L 138   POTASSIUM mmol/L 4 6   CHLORIDE mmol/L 108   CO2 mmol/L 27   BUN mg/dL 15   CREATININE mg/dL 0 85   CALCIUM mg/dL 9 6            Lab Units 02/26/22  0910 02/05/22  0917   TOTAL PROTEIN g/dL 8 1 8 0   ALBUMIN g/dL 3 7 3 8   TOTAL BILIRUBIN mg/dL 0 68 0 61   BILIRUBIN DIRECT mg/dL 0 14  --    AST U/L 54* 49*   ALT U/L 100* 74   ALK PHOS U/L 138* 128*           Lab Units 02/05/22  0917   WBC Thousand/uL 4 72   HEMOGLOBIN g/dL 13 1   HEMATOCRIT % 42 2   PLATELETS Thousands/uL 279   MCV fL 94   MCH pg 29 1   MCHC g/dL 31 0*   RDW % 13 3   MPV fL 10 7   NRBC AUTO /100 WBCs 0   NEUTROS PCT % 56   IMMATURE GRANULOCYTES % % 0   LYMPHS PCT % 26   MONOS PCT % 10   EOS PCT % 6   BASOS PCT % 2*   NEUTROS ABS Thousands/µL 2 65   IMMATURE GRANULOCYES ABS Thousand/uL 0 02   LYMPHS ABS Thousands/µL 1 24   MONOS ABS Thousand/µL 0 46   EOS ABS Thousand/µL 0 28   BASOS ABS Thousands/µL 0 07       No results for input(s): IRON, TRANSFERRIN, TRANSFERSAT, FERRITIN, RETIC in the last 32074 hours  No results found for: CRP    Lab Results   Component Value Date    ZRT4CUGDMWTM 0 891 02/05/2022       Radiology Results:   CT renal stone study abdomen pelvis wo contrast    Result Date: 10/31/2022  Narrative: CT ABDOMEN AND PELVIS WITHOUT IV CONTRAST - LOW DOSE RENAL STONE INDICATION:   R82 998: Other abnormal findings in urine R10 9: Unspecified abdominal pain R31 29: Other microscopic hematuria  COMPARISON:  Abdominal ultrasound 4/3/2022   TECHNIQUE:  Low radiation dose thin section CT examination of the abdomen and pelvis was performed without intravenous or oral contrast according to a protocol specifically designed to evaluate for urinary tract calculus  Axial, sagittal, and coronal 2D  reformatted images were created from the source data and submitted for interpretation  Evaluation for pathology in the abdomen and pelvis that is unrelated to urinary tract calculi is limited  Radiation dose length product (DLP) for this visit:  278 93 mGy-cm   This examination, like all CT scans performed in the [facility], was performed utilizing techniques to minimize radiation dose exposure, including the use of iterative  reconstruction and automated exposure control  URINARY TRACT FINDINGS: RIGHT KIDNEY AND URETER:  Normal size and position  No gross mass on noncontrast study  No hydronephrosis  Nonobstructing 3 mm calculus at the lower pole  Ureter within normal limits  LEFT KIDNEY AND URETER:  Normal size and position  No gross mass on noncontrast study  No calcified stones or hydronephrosis  Ureter within normal limits  URINARY BLADDER:  Diffusely thickened wall disproportionate to the level of distention with mild perivesical inflammatory stranding concerning for acute cystitis  No calculi  ADDITIONAL FINDINGS: LOWER CHEST: Small hiatal hernia noted  No other clinically significant abnormality identified in the visualized lower chest  No consolidation or effusion  SOLID VISCERA:   Limited low radiation dose noncontrast CT evaluation demonstrates no clinically significant abnormality of the imaged portions of the liver, spleen, pancreas, or adrenal glands  BILIARY: No intrahepatic biliary ductal dilatation  Normal caliber common bile duct  GALLBLADDER: No calcified gallstones  Normal wall thickness  No pericholecystic inflammatory changes  STOMACH AND BOWEL: Stomach is grossly within normal limits  Normal caliber small bowel  Normal caliber large bowel  No evidence of active small or large bowel inflammatory process  APPENDIX: No findings to suggest acute appendicitis  ABDOMINOPELVIC CAVITY: No ascites   No intraperitoneal free air  No lymphadenopathy  No retroperitoneal hematoma  VESSELS: Normal caliber abdominal aorta with mild atherosclerotic plaque  REPRODUCTIVE ORGANS:  Hysterectomy  No evidence of pelvic or adnexal mass  ABDOMINAL WALL/INGUINAL REGIONS:  Diastases recti with small fat-containing umbilical hernia  BONES:  Vertebral body height is maintained  No acute fracture or destructive osseous lesion  Impression: 1  Diffusely thickened urinary bladder wall with mild perivesical inflammatory stranding concerning for acute cystitis  2   Nonobstructing 3 mm calculus in the lower pole of the right kidney  No evidence of obstructive uropathy  The study was marked in EPIC for significant notification   Workstation performed: [id number]     Juancarlos Headley MD  PGY-4 Gastroenterology Fellow  11/7/2022 8:09 AM

## 2022-11-09 ENCOUNTER — TELEPHONE (OUTPATIENT)
Dept: NEUROLOGY | Facility: CLINIC | Age: 52
End: 2022-11-09

## 2022-11-09 NOTE — TELEPHONE ENCOUNTER
Pt called in to r/s her missed appt from 10/28  New appt with Roxanna Simmons is now 11:14 @ 1:15 in [place]

## 2022-11-14 ENCOUNTER — OFFICE VISIT (OUTPATIENT)
Dept: NEUROLOGY | Facility: CLINIC | Age: 52
End: 2022-11-14

## 2022-11-14 VITALS
BODY MASS INDEX: 30.12 KG/M2 | SYSTOLIC BLOOD PRESSURE: 120 MMHG | RESPIRATION RATE: 18 BRPM | WEIGHT: 187.4 LBS | TEMPERATURE: 97.7 F | DIASTOLIC BLOOD PRESSURE: 62 MMHG | HEART RATE: 64 BPM | HEIGHT: 66 IN

## 2022-11-14 DIAGNOSIS — R25.3 MUSCLE TWITCHING: Primary | ICD-10-CM

## 2022-11-14 DIAGNOSIS — G44.209 TENSION HEADACHE: ICD-10-CM

## 2022-11-14 NOTE — PROGRESS NOTES
Patient ID: Jessica Jorge is a 45 y o  female  Assessment/Plan:    Muscle twitching  Patient with intermittent muscle twitching in all of the extremities, usually worse when she is trying to fall asleep or relaxing her body   EMG was normal with no evidence to support a generalized neurogenic or myopathic process      No evidence of atrophy, fasciculations or weakness on exam      We have discussed this is likely benign fasciculation syndrome   Reassurance given that her neurologic workup is unremarkable for a more concerning neuromuscular disorder      Encouraged the patient to stay hydrated, get proper rest, limit caffeine/stimulants       Tension headache  Well controlled currently  Castillo Segal is taking baclofen 20mg daily, and using magnesium and B2 supplements regularly   She uses Excedrin at onset of HA if needed  Reminded patient to limit use of OTC NSAIDs/tylenol to no more than 2-3 times a week to prevent rebound HA  Encouraged maintaining proper hydration, eating regularly/not skipping meals and getting adequate sleep  Patient will follow up in 6-8 months or sooner if needed  Diagnoses and all orders for this visit:    Muscle twitching    Tension headache           Subjective:    HPI    Patient is a 45year old right handed female who presents today for neurologic follow up  She was last seen in April 2022  To review, she was initially evaluated by Dr Kylah Patton on 6/15/2020 for several issues  Patient described that for the past 8-9 months she had been experiencing muscle twitching  She described twitching proximally in any of the 4 extremities although occasionally will occur in the hands when she is working on the computer  She stated this was particularly apparent when resting  She had no associated weakness or sensory symptoms  No symptoms to suggest a Lhermitte's phenomenon  No change in bowel or bladder function, speech or swallowing   In reviewing past studies, she was found to be vitamin-D deficient  She was put on a supplement  Her father has Parkinson's disease  However, she is unaware of any family member with neuro muscular disease  She also described headaches  These date back a long time were getting worse over the past 8-9 months as well  These are described as generally right frontoparietal in location  She describes these predominantly as tightness, with rare modest pulsatile component  These have been occurring in the recent months on average of every other day and she does take Excedrin 2 tablets generally every other day, suggesting the possibility analgesic overuse effect as well  Labs completed 8/13/2020 with normal CK, slightly low aldolase, slightly elevated TSH at 4 25 with normal FT4 1 12, normal magnesium, low vit D (29), normal folate, normal ionized calcium, essentially unremarkable CBC and CMP  EMG of the LUE/LLE completed 10/14/2020 by Dr Iman Barrett was a normal study with no evidence of a generalized neurogenic or myopathic process effecting the peripheral nervous system  At timing of visit in Feb 2021 she reported some issues with vertigo  She has a history of BPPV, but more recently had experienced some episodes of vertigo associated with head pressure that seemed to be brought on by stress  This did not occur with head movement  Also with some imbalance  MRI brain with IACs w and wo contrast was completed 4/14/2021  Normal appearance of the IACs  Scattered FLAIR and T2 foci are seen most compatible with chronic microangiopathic changes in patients of this age group  Today, patient reports she is doing well  The twitching is the same, no changes  Headaches are also stable, mild, and relieved with Excedrin  She is taking B2 and magnesium consistently and feels this has helped reduce her headache frequency and severity  She denies any new neurologic symptoms including no vision changes, speech or swallowing difficulty, bowel changes   She continues to use baclofen 20mg daily  The following portions of the patient's history were reviewed and updated as appropriate: current medications, past family history, past medical history, past social history, past surgical history and problem list          Objective:    Blood pressure 120/62, pulse 64, temperature 97 7 °F (36 5 °C), temperature source Tympanic, resp  rate 18, height 5' 5 5" (1 664 m), weight 85 kg (187 lb 6 4 oz), not currently breastfeeding  Physical Exam  Constitutional:       Appearance: Normal appearance  HENT:      Head: Normocephalic and atraumatic  Eyes:      Extraocular Movements: EOM normal       Pupils: Pupils are equal, round, and reactive to light  Neurological:      Mental Status: She is alert  Deep Tendon Reflexes: Strength normal and reflexes are normal and symmetric  Psychiatric:         Mood and Affect: Mood normal          Speech: Speech normal          Behavior: Behavior normal          Neurological Exam  Mental Status  Alert  Oriented to person, place, time and situation  Speech is normal  Language is fluent with no aphasia  Attention and concentration are normal     Cranial Nerves  CN II: Visual fields full to confrontation  CN III, IV, VI: Extraocular movements intact bilaterally  Pupils equal round and reactive to light bilaterally  CN V: Facial sensation is normal   CN VII: Full and symmetric facial movement  CN VIII: Hearing is normal   CN IX, X: Palate elevates symmetrically  CN XI: Shoulder shrug strength is normal   CN XII: Tongue midline without atrophy or fasciculations  Motor   Normal muscle tone  Strength is 5/5 throughout all four extremities  Sensory  Light touch is normal in upper and lower extremities  Reflexes  Deep tendon reflexes are 2+ and symmetric in all four extremities      Coordination  Right: Finger-to-nose normal Left: Finger-to-nose normal     Gait  Casual gait is normal including stance, stride, and arm swing         ROS:    Review of Systems   Constitutional: Negative for chills and fever  HENT: Negative for ear pain and sore throat  Eyes: Negative for pain and visual disturbance  Respiratory: Negative for cough and shortness of breath  Cardiovascular: Negative for chest pain and palpitations  Gastrointestinal: Negative for abdominal pain and vomiting  Genitourinary: Negative for dysuria and hematuria  Musculoskeletal: Negative for arthralgias and back pain  Muscle twitching     Skin: Negative for color change and rash  Neurological: Negative for seizures and syncope  All other systems reviewed and are negative      I personally reviewed and updated the ROS as appropriate

## 2022-11-14 NOTE — ASSESSMENT & PLAN NOTE
Well controlled currently  Javier Rahsid is taking baclofen 20mg daily, and using magnesium and B2 supplements regularly   She uses Excedrin at onset of HA if needed  Reminded patient to limit use of OTC NSAIDs/tylenol to no more than 2-3 times a week to prevent rebound HA  Encouraged maintaining proper hydration, eating regularly/not skipping meals and getting adequate sleep

## 2022-11-16 DIAGNOSIS — R42 VERTIGO: ICD-10-CM

## 2022-11-17 RX ORDER — MECLIZINE HYDROCHLORIDE 25 MG/1
25 TABLET ORAL EVERY 8 HOURS PRN
Qty: 100 TABLET | Refills: 0 | Status: SHIPPED | OUTPATIENT
Start: 2022-11-17

## 2022-12-02 DIAGNOSIS — K21.9 GASTROESOPHAGEAL REFLUX DISEASE WITHOUT ESOPHAGITIS: ICD-10-CM

## 2022-12-05 RX ORDER — OMEPRAZOLE 20 MG/1
CAPSULE, DELAYED RELEASE ORAL
Qty: 90 CAPSULE | Refills: 2 | Status: SHIPPED | OUTPATIENT
Start: 2022-12-05

## 2023-01-04 ENCOUNTER — CONSULT (OUTPATIENT)
Dept: BARIATRICS | Facility: CLINIC | Age: 53
End: 2023-01-04

## 2023-01-04 VITALS
SYSTOLIC BLOOD PRESSURE: 118 MMHG | WEIGHT: 186 LBS | HEART RATE: 76 BPM | DIASTOLIC BLOOD PRESSURE: 70 MMHG | RESPIRATION RATE: 16 BRPM | HEIGHT: 66 IN | BODY MASS INDEX: 29.89 KG/M2

## 2023-01-04 DIAGNOSIS — E89.0 POSTABLATIVE HYPOTHYROIDISM: ICD-10-CM

## 2023-01-04 DIAGNOSIS — E66.9 OBESITY, UNSPECIFIED CLASSIFICATION, UNSPECIFIED OBESITY TYPE, UNSPECIFIED WHETHER SERIOUS COMORBIDITY PRESENT: ICD-10-CM

## 2023-01-04 DIAGNOSIS — F41.9 ANXIETY: ICD-10-CM

## 2023-01-04 DIAGNOSIS — K21.9 ESOPHAGEAL REFLUX: ICD-10-CM

## 2023-01-04 DIAGNOSIS — E66.9 OBESITY, CLASS I, BMI 30-34.9: Primary | ICD-10-CM

## 2023-01-04 DIAGNOSIS — R79.89 ELEVATED LIVER FUNCTION TESTS: ICD-10-CM

## 2023-01-04 DIAGNOSIS — K76.0 FATTY LIVER: ICD-10-CM

## 2023-01-04 PROBLEM — E66.811 OBESITY, CLASS I, BMI 30-34.9: Status: ACTIVE | Noted: 2023-01-04

## 2023-01-04 NOTE — PROGRESS NOTES
Assessment/Plan:    Obesity, Class I, BMI 30-34 9  - Discussed options of Healthy[facility]-Intensive Lifestyle Intervention Program, Very Low Calorie Diet-VLCD and Conservative Program and the role of weight loss medications  - Explained the importance of making lifestyle changes first before starting anti-obesity medications  - Patient should demonstrate lifestyle changes first before anti-obesity medication initiated  - Not a topamax candidate due to kidney stones  - Patient is interested in pursuing Healthy[facility]-Intensive Lifestyle Intervention Program  - Initial weight loss goal of 5-10% weight loss for improved health  - Weight loss can improve patient's co-morbid conditions and/or prevent weight-related complications  - Stop Hernán Carrillo 3/8  - Labs reviewed: CMP, lipid panel, TSH, and CBC 2/5/2022  AST and alk phos elevated - following with GI and may improve with weight loss  Chol and LDL elevated, which may improve with weight loss  Otherwise, labs within acceptable range  - Check A1C and fasting insulin  Esophageal reflux  - Taking Omeprazole  May improve with weight loss and lifestyle modification  Continue management with prescribing provider  Hypothyroidism  - Taking levothyroxine  Continue management with prescribing provider  Anxiety  - Taking zoloft  Continue management with prescribing provider  Elevated liver function tests  - Following with GI  Fatty liver  - Following with GI  Maribell Ott was seen today for consult  Diagnoses and all orders for this visit:    Obesity, Class I, BMI 30-34 9  -     HEMOGLOBIN A1C W/ EAG ESTIMATION; Future  -     Insulin, fasting; Future    Fatty liver  -     Ambulatory Referral to Weight Management  -     HEMOGLOBIN A1C W/ EAG ESTIMATION; Future  -     Insulin, fasting;  Future    Elevated liver function tests  -     Ambulatory Referral to Weight Management    Obesity, unspecified classification, unspecified obesity type, unspecified whether serious comorbidity present  -     Ambulatory Referral to Weight Management    Esophageal reflux    Postablative hypothyroidism    Anxiety          Follow up in approximately end of [facility] with Non-Surgical Physician/Advanced Practitioner  Subjective:   Chief Complaint   Patient presents with   • Consult     MWM consult; waist 37 5in; goal wt 135; stop bang 3-8       Patient ID: Kuldeep Morris  is a 45 y o  female with excess weight/obesity here to pursue weight management  Previous notes and records have been reviewed      Past Medical History:   Diagnosis Date   • Disease of thyroid gland    • Graves disease    • Headache(784 0)    • Herpes simplex infection    • Migraine without aura and responsive to treatment    • Postablative hypothyroidism    • Varicella without complication      Past Surgical History:   Procedure Laterality Date   • HYSTERECTOMY      age 34   • MYOMECTOMY     • NEUROPLASTY / TRANSPOSITION MEDIAN NERVE AT CARPAL TUNNEL Right    • TOOTH EXTRACTION         HPI:  Wt Readings from Last 20 Encounters:   01/04/23 84 4 kg (186 lb)   11/14/22 85 kg (187 lb 6 4 oz)   11/07/22 85 2 kg (187 lb 12 8 oz)   10/31/22 84 4 kg (186 lb)   09/06/22 85 3 kg (188 lb)   04/29/22 84 5 kg (186 lb 3 2 oz)   04/27/22 82 6 kg (182 lb)   03/26/22 82 6 kg (182 lb)   03/10/22 83 2 kg (183 lb 6 4 oz)   02/08/22 82 2 kg (181 lb 3 2 oz)   10/29/21 81 8 kg (180 lb 6 4 oz)   06/28/21 80 3 kg (177 lb)   03/31/21 80 3 kg (177 lb)   02/25/21 80 4 kg (177 lb 3 2 oz)   02/24/21 79 9 kg (176 lb 3 2 oz)   10/22/20 78 7 kg (173 lb 9 6 oz)   10/21/20 78 5 kg (173 lb)   06/15/20 78 4 kg (172 lb 12 8 oz)   02/27/20 77 8 kg (171 lb 9 6 oz)   01/29/20 70 3 kg (155 lb)     Obesity/Excess Weight:  Severity: Mild  Onset:  Last several years    Modifiers: Diet and Exercise  Contributing factors: Poor Food Choices, Insufficient Physical Activity and Menopause  Associated symptoms: comorbid conditions, increased joint pain and increased shortness of breath     Following with GI for fatty liver  Has some cravings for sweets in the evening   does the cooking  Hydration: 1-2 cups water, medium Aixa iced coffee with caramel and milk, occ unsweetened iced tea and stevia, 1 cup almond milk    Alcohol: rare   Smoking: denies  Exercise: none  Occupation: [employer]  Sleep: 5-7 hours  STOP bang: 3/8    Highest weight: current  Current weight: 186 lbs BMI 30 48  Goal weight: 135-140 lbs    Colonoscopy: scheduled Jan 9, 2023  Mammogram: UTD, due April 2023    The following portions of the patient's history were reviewed and updated as appropriate: allergies, current medications, past family history, past medical history, past social history, past surgical history, and problem list     Family History   Problem Relation Age of Onset   • Depression Mother    • Migraines Mother    • Osteopenia Mother    • Aortic aneurysm Father    • Hyperlipidemia Father    • Hypertension Father    • Heart disease Father         ischemic   • Thyroid disease Father    • Stroke Father    • Transient ischemic attack Father    • Anxiety disorder Brother    • Depression Brother    • Lung cancer Maternal Uncle         70's   • Skin cancer Maternal Uncle         70's   • No Known Problems Paternal Aunt    • Thyroid disease Paternal Uncle    • Diabetes Maternal Grandmother    • Stroke Maternal Grandmother    • Skin cancer Maternal Grandfather         80's or 90's   • Stroke Maternal Grandfather    • Osteoporosis Paternal Grandmother    • No Known Problems Paternal Grandfather    • Anxiety disorder Son    • ADD / ADHD Son    • Thyroid disease Cousin         Review of Systems   HENT: Negative for sore throat  Respiratory: Negative for cough and shortness of breath  Cardiovascular: Negative for chest pain and palpitations     Gastrointestinal: Negative for constipation, diarrhea, nausea and vomiting         + GERD controlled with medication   Endocrine: Positive for heat intolerance (intermittent)  Negative for cold intolerance  Genitourinary: Negative for dysuria  Musculoskeletal: Positive for back pain (chronic)  Negative for arthralgias  Skin: Negative for rash  Neurological: Positive for headaches (chronic - muscle tension)  Psychiatric/Behavioral: Negative for suicidal ideas (or HI)  Denies depression and anxiety       Objective:  /70   Pulse 76   Resp 16   Ht 5' 5 5" (1 664 m)   Wt 84 4 kg (186 lb)   LMP  (LMP Unknown)   BMI 30 48 kg/m²     Physical Exam  Vitals and nursing note reviewed  Constitutional   General appearance: Abnormal   well developed and obese  Eyes No conjunctival injection  Ears, Nose, Mouth, and Throat Oral mucosa moist    Pulmonary   Respiratory effort: No increased work of breathing or signs of respiratory distress  Cardiovascular     Examination of extremities for edema and/or varicosities: Normal   no edema  Abdomen   Abdomen: Abnormal   The abdomen was obese      Musculoskeletal   Normal range of motion  Neurological   Gait and station: Normal     Psychiatric   Orientation to person, place and time: Normal     Affect: appropriate

## 2023-01-04 NOTE — ASSESSMENT & PLAN NOTE
- Discussed options of Healthy[facility]-Intensive Lifestyle Intervention Program, Very Low Calorie Diet-VLCD and Conservative Program and the role of weight loss medications  - Explained the importance of making lifestyle changes first before starting anti-obesity medications  - Patient should demonstrate lifestyle changes first before anti-obesity medication initiated  - Not a topamax candidate due to kidney stones  - Patient is interested in pursuing Healthy[facility]-Intensive Lifestyle Intervention Program  - Initial weight loss goal of 5-10% weight loss for improved health  - Weight loss can improve patient's co-morbid conditions and/or prevent weight-related complications  - Stop Jovita Patel 3/8  - Labs reviewed: CMP, lipid panel, TSH, and CBC 2/5/2022  AST and alk phos elevated - following with GI and may improve with weight loss  Chol and LDL elevated, which may improve with weight loss  Otherwise, labs within acceptable range  - Check A1C and fasting insulin

## 2023-01-04 NOTE — ASSESSMENT & PLAN NOTE
- Taking Omeprazole  May improve with weight loss and lifestyle modification  Continue management with prescribing provider

## 2023-01-08 RX ORDER — SODIUM CHLORIDE, SODIUM LACTATE, POTASSIUM CHLORIDE, CALCIUM CHLORIDE 600; 310; 30; 20 MG/100ML; MG/100ML; MG/100ML; MG/100ML
50 INJECTION, SOLUTION INTRAVENOUS CONTINUOUS
Status: CANCELLED | OUTPATIENT
Start: 2023-01-08

## 2023-01-09 ENCOUNTER — HOSPITAL ENCOUNTER (OUTPATIENT)
Dept: GASTROENTEROLOGY | Facility: AMBULARY SURGERY CENTER | Age: 53
Setting detail: OUTPATIENT SURGERY
Discharge: HOME | End: 2023-01-09

## 2023-01-09 ENCOUNTER — ANESTHESIA (OUTPATIENT)
Dept: ANESTHESIOLOGY | Facility: HOSPITAL | Age: 53
End: 2023-01-09

## 2023-01-09 ENCOUNTER — ANESTHESIA EVENT (OUTPATIENT)
Dept: ANESTHESIOLOGY | Facility: HOSPITAL | Age: 53
End: 2023-01-09

## 2023-01-09 ENCOUNTER — ANESTHESIA EVENT (OUTPATIENT)
Dept: GASTROENTEROLOGY | Facility: AMBULARY SURGERY CENTER | Age: 53
End: 2023-01-09

## 2023-01-09 ENCOUNTER — ANESTHESIA (OUTPATIENT)
Dept: GASTROENTEROLOGY | Facility: AMBULARY SURGERY CENTER | Age: 53
End: 2023-01-09

## 2023-01-09 ENCOUNTER — TELEPHONE (OUTPATIENT)
Dept: BARIATRICS | Facility: CLINIC | Age: 53
End: 2023-01-09

## 2023-01-09 VITALS
BODY MASS INDEX: 30.66 KG/M2 | TEMPERATURE: 96.8 F | WEIGHT: 184 LBS | RESPIRATION RATE: 16 BRPM | SYSTOLIC BLOOD PRESSURE: 112 MMHG | HEIGHT: 65 IN | OXYGEN SATURATION: 98 % | HEART RATE: 54 BPM | DIASTOLIC BLOOD PRESSURE: 56 MMHG

## 2023-01-09 DIAGNOSIS — K21.9 GASTROESOPHAGEAL REFLUX DISEASE WITHOUT ESOPHAGITIS: ICD-10-CM

## 2023-01-09 DIAGNOSIS — R19.4 CHANGE IN BOWEL HABIT: ICD-10-CM

## 2023-01-09 RX ORDER — PROPOFOL 10 MG/ML
INJECTION, EMULSION INTRAVENOUS AS NEEDED
Status: DISCONTINUED | OUTPATIENT
Start: 2023-01-09 | End: 2023-01-09

## 2023-01-09 RX ORDER — SODIUM CHLORIDE 9 MG/ML
INJECTION, SOLUTION INTRAVENOUS CONTINUOUS PRN
Status: DISCONTINUED | OUTPATIENT
Start: 2023-01-09 | End: 2023-01-09

## 2023-01-09 RX ORDER — PROPOFOL 10 MG/ML
INJECTION, EMULSION INTRAVENOUS CONTINUOUS PRN
Status: DISCONTINUED | OUTPATIENT
Start: 2023-01-09 | End: 2023-01-09

## 2023-01-09 RX ADMIN — PROPOFOL 120 MG: 10 INJECTION, EMULSION INTRAVENOUS at 14:12

## 2023-01-09 RX ADMIN — SODIUM CHLORIDE: 0.9 INJECTION, SOLUTION INTRAVENOUS at 14:09

## 2023-01-09 RX ADMIN — PROPOFOL 100 MCG/KG/MIN: 10 INJECTION, EMULSION INTRAVENOUS at 14:12

## 2023-01-09 RX ADMIN — PROPOFOL 50 MG: 10 INJECTION, EMULSION INTRAVENOUS at 14:16

## 2023-01-09 NOTE — ANESTHESIA PREPROCEDURE EVALUATION
Procedure:  PRE-OP ONLY    Relevant Problems   CARDIO   (+) Hyperlipidemia      ENDO   (+) Hypothyroidism      GI/HEPATIC   (+) Esophageal reflux   (+) Fatty liver      NEURO/PSYCH   (+) Anxiety   (+) Tension headache      Nervous and Auditory   (+) BPPV (benign paroxysmal positional vertigo)      Other   (+) Obesity, Class I, BMI 30-34 9             Anesthesia Plan  ASA Score- 2     Anesthesia Type-     Plan Factors-Exercise tolerance (METS): >4 METS  Chart reviewed  Existing labs reviewed  Patient summary reviewed  Induction-     Postoperative Plan-     Informed Consent- Anesthetic plan and risks discussed with patient  I personally reviewed this patient with the CRNA  Discussed and agreed on the Anesthesia Plan with the CRNA  Vadim Roldan             No results found for: HGBA1C    Lab Results   Component Value Date    K 4 6 02/05/2022     02/05/2022    CO2 27 02/05/2022    BUN 15 02/05/2022    CREATININE 0 85 02/05/2022    GLUF 87 02/05/2022    CALCIUM 9 6 02/05/2022    AST 54 (H) 02/26/2022     (H) 02/26/2022    ALKPHOS 138 (H) 02/26/2022    EGFR 78 02/05/2022       Lab Results   Component Value Date    WBC 4 72 02/05/2022    HGB 13 1 02/05/2022    HCT 42 2 02/05/2022    MCV 94 02/05/2022     02/05/2022

## 2023-01-09 NOTE — ANESTHESIA PREPROCEDURE EVALUATION
Procedure:  COLONOSCOPY  EGD    Relevant Problems   CARDIO   (+) Hyperlipidemia      ENDO   (+) Hypothyroidism      GI/HEPATIC   (+) Esophageal reflux   (+) Fatty liver      NEURO/PSYCH   (+) Anxiety   (+) Tension headache      PULMONARY   (-) Asthma   (-) Sleep apnea   (-) Smoking        Physical Exam    Airway    Mallampati score: II  TM Distance: >3 FB  Neck ROM: full     Dental       Cardiovascular  Cardiovascular exam normal    Pulmonary  Pulmonary exam normal     Other Findings        Anesthesia Plan  ASA Score- 2     Anesthesia Type- IV sedation with anesthesia with ASA Monitors  Additional Monitors:   Airway Plan:           Plan Factors-Exercise tolerance (METS): >4 METS  Chart reviewed  Existing labs reviewed  Patient summary reviewed  Patient is not a current smoker  Patient not instructed to abstain from smoking on day of procedure  Patient did not smoke on day of surgery  Induction-     Postoperative Plan-     Informed Consent- Anesthetic plan and risks discussed with patient and spouse  I personally reviewed this patient with the CRNA  Discussed and agreed on the Anesthesia Plan with the CRNA  Gerhardt Sep           No results found for: HGBA1C    Lab Results   Component Value Date    K 4 6 02/05/2022     02/05/2022    CO2 27 02/05/2022    BUN 15 02/05/2022    CREATININE 0 85 02/05/2022    GLUF 87 02/05/2022    CALCIUM 9 6 02/05/2022    AST 54 (H) 02/26/2022     (H) 02/26/2022    ALKPHOS 138 (H) 02/26/2022    EGFR 78 02/05/2022       Lab Results   Component Value Date    WBC 4 72 02/05/2022    HGB 13 1 02/05/2022    HCT 42 2 02/05/2022    MCV 94 02/05/2022     02/05/2022

## 2023-01-09 NOTE — H&P
History and Physical - [facility]  Elin Garcia 45 y o  female MRN: [id number]                  HPI: Elin Garcia is a 45y o  year old female who presents for EGD and colonoscopy due to multiple GI symptoms  REVIEW OF SYSTEMS: Per the HPI, and otherwise unremarkable      Historical Information   Past Medical History:   Diagnosis Date   • Disease of thyroid gland    • Graves disease    • Headache(784 0)    • Herpes simplex infection    • Migraine without aura and responsive to treatment    • Postablative hypothyroidism    • Varicella without complication      Past Surgical History:   Procedure Laterality Date   • HYSTERECTOMY      age 34   • MYOMECTOMY     • NEUROPLASTY / TRANSPOSITION MEDIAN NERVE AT CARPAL TUNNEL Right    • TOOTH EXTRACTION       Social History   Social History     Substance and Sexual Activity   Alcohol Use Yes    Comment: drink a few glasses of wine on special occasions     Social History     Substance and Sexual Activity   Drug Use No     Social History     Tobacco Use   Smoking Status Never   Smokeless Tobacco Never     Family History   Problem Relation Age of Onset   • Depression Mother    • Migraines Mother    • Osteopenia Mother    • Aortic aneurysm Father    • Hyperlipidemia Father    • Hypertension Father    • Heart disease Father         ischemic   • Thyroid disease Father    • Stroke Father    • Transient ischemic attack Father    • Anxiety disorder Brother    • Depression Brother    • Lung cancer Maternal Uncle         70's   • Skin cancer Maternal Uncle         70's   • No Known Problems Paternal Aunt    • Thyroid disease Paternal Uncle    • Diabetes Maternal Grandmother    • Stroke Maternal Grandmother    • Skin cancer Maternal Grandfather         [de-identified] or 80's   • Stroke Maternal Grandfather    • Osteoporosis Paternal Grandmother    • No Known Problems Paternal Grandfather    • Anxiety disorder Son    • ADD / ADHD Son    • Thyroid disease Cousin        Meds/Allergies       Current Outpatient Medications:   •  baclofen 10 mg tablet  •  bisacodyl (DULCOLAX) 5 mg EC tablet  •  ergocalciferol (VITAMIN D2) 50,000 units  •  levothyroxine 88 mcg tablet  •  meclizine (ANTIVERT) 25 mg tablet  •  Multiple Vitamins-Minerals (DAILY MULTI PO)  •  omeprazole (PriLOSEC) 20 mg delayed release capsule  •  PEG 3350-KCl-NaCl-NaSulf-Na Asc-C (MOVIPREP) 100 g  •  sertraline (ZOLOFT) 50 mg tablet    No Known Allergies    Objective     LMP  (LMP Unknown)       PHYSICAL EXAM    Gen: NAD  Head: NCAT  CV: RRR  CHEST: Clear  ABD: soft, NT/ND  EXT: no edema      ASSESSMENT/PLAN:  This is a 45y o  year old female here for EGD and colonoscopy, and she is stable and optimized for her procedure

## 2023-01-11 NOTE — PROGRESS NOTES
Weight Management Medical Nutrition Assessment  Mitzy Chicas presented for the [employer] with the [facility] Program  Today's weight is 185 3#  Mitzy Chicas is very motivated  She has been incorporating nutritious foods into her daily eating routine  She has food logged in the past  Per dietary recall patient consumes excess calories from sweet carbohydrates during the evening hours and takeout foods  We reviewed convenient meal ideas that fit her lifestyle  Discussed the importance of meal consistency to aid in weight loss goal  Discussed alternative ingredients in her coffee to save calories and high protein snacks that will satisfy her sweet craving  Mitzy Chicas plans to drink more water for adequate hydration, log her meals in [email or web address], and use measuring tools for portion control  She is following with JAYASHREE DAS completed  Will review results at 2 week f/u visit  We developed and reviewed a low calorie meal plan       Patient seen by Medical Provider in past 6 months:  yes  Requested to schedule appointment with Medical Provider: No      Anthropometric Measurements  Start Weight (#): 185 3# (186lb 1/4/23)  Current Weight (#): n/a  TBW % Change from start weight: n/a  Ideal Body Weight (#): 127 5#  Goal Weight (#): 140-145#  Highest:   Lowest:    Weight Loss History  Previous weight loss attempts: Self Created Diets (Portion Control, Healthy Food Choices, etc )    Food and Nutrition Related History  Wake up: 5:30AM  Bed Time: 11PM    Food Recall  Breakfast: 6:00/6:30AM [place] drink    Snack: Aixa coffee w/ caramel shot, whole milk, 1 sugar (sips on it all day)  Lunch: 12-1:30PM Chobani Greek yogurt w/ granola OR 1 hot pocket OR leftovers OR soup w/ lelia Aj sandwich from [address]: skip  Dinner: 6/6:30PM spaghetti w/ meat sauce OR 3oz chicken, 3/4-1 cup pasta, 1/2 cup corn/string beans OR takeout (pizza, burgers, pasta)   Snack: 8:30/9AM chocolate chip cookies w/ 2% milk OR 1 cup ice cream OR cinnamon roll    Beverages: 24-36oz water, 1 cup almond milk, [place] [facility] [place] or [place] unsweetened iced tea and medium Aixa iced coffee w/ caramel and milk  Alcohol: rare  Volume of beverage intake: Approximately 50-60 oz    Weekends: similar (sometimes grab a breakfast sandwich)  Cravings: sweets  Trouble area of day: late evening    Frequency of Eating out: 2-3x/week  Food restrictions: none  Cooking:   Food Shopping: self    Physical Activity Intake  Activity: sedentary  Frequency:never  Physical limitations/barriers to exercise: none    Estimated Needs  Energy  SECA: BMR: 1987      X 1 3 -1000 = [place] [address] Energy Needs: BMR : 6797   1-2# loss weekly sedentary:  581-2082            1-2# loss weekly lightly active: 997-1497  Maintenance calories for sedentary activity level: 1743  Protein: 70-87gm      (1 2-1 5g/kg IBW)  Fluid: 70oz     (35mL/kg IBW)    Nutrition Diagnosis  Yes; Overweight/obesity  related to Excess energy intake as evidenced by  BMI more than normative standard for age and sex (obesity-grade I 26-30  9)       Nutrition Intervention    Nutrition Prescription  Calories: 4816-0937 calories  Protein: 70-87gm  Fluid: 70oz    Meal Plan (Bryan/Pro/Carb)  Breakfast: 200-250/15  Snack: 150/5  Lunch: 300-350/20  Snack: skip  Dinner: 350-400/25  Snack: 100-150/5-10    Nutrition Education:    Healthy Core Manual  Calorie controlled menu  Lean protein food choices  Healthy snack options  Food journaling tips      Nutrition Counseling:  Strategies: meal planning, portion sizes, healthy snack choices, hydration, fiber intake, protein intake, exercise, food journal      Monitoring and Evaluation:  Evaluation criteria:  Energy Intake  Meet protein needs  Maintain adequate hydration  Monitor weekly weight  Meal planning/preparation  Food journal   Decreased portions at mealtimes and snacks  Physical activity     Barriers to learning:none  Readiness to change: Preparation:  (Getting ready to change)   Comprehension: good  Expected Compliance: good

## 2023-01-13 ENCOUNTER — APPOINTMENT (OUTPATIENT)
Dept: LAB | Facility: CLINIC | Age: 53
End: 2023-01-13

## 2023-01-13 DIAGNOSIS — E66.9 OBESITY, CLASS I, BMI 30-34.9: ICD-10-CM

## 2023-01-13 DIAGNOSIS — K76.0 FATTY LIVER: ICD-10-CM

## 2023-01-13 DIAGNOSIS — R79.89 ELEVATED LIVER FUNCTION TESTS: ICD-10-CM

## 2023-01-13 LAB
ALBUMIN SERPL BCP-MCNC: 3.8 G/DL (ref 3.5–5)
ALP SERPL-CCNC: 138 U/L (ref 46–116)
ALT SERPL W P-5'-P-CCNC: 27 U/L (ref 12–78)
AST SERPL W P-5'-P-CCNC: 19 U/L (ref 5–45)
BILIRUB DIRECT SERPL-MCNC: 0.14 MG/DL (ref 0–0.2)
BILIRUB SERPL-MCNC: 0.61 MG/DL (ref 0.2–1)
EST. AVERAGE GLUCOSE BLD GHB EST-MCNC: 105 MG/DL
HAV IGM SER QL: NORMAL
HBA1C MFR BLD: 5.3 %
HBV CORE IGM SER QL: NORMAL
HBV SURFACE AG SER QL: NORMAL
HCV AB SER QL: NORMAL
INSULIN SERPL-ACNC: 9.2 MU/L (ref 3–25)
PROT SERPL-MCNC: 8.4 G/DL (ref 6.4–8.4)

## 2023-01-14 LAB
ENDOMYSIUM IGA SER QL: NEGATIVE
GLIADIN PEPTIDE IGA SER-ACNC: 7 UNITS (ref 0–19)
GLIADIN PEPTIDE IGG SER-ACNC: 8 UNITS (ref 0–19)
IGA SERPL-MCNC: 165 MG/DL (ref 87–352)
TTG IGA SER-ACNC: <2 U/ML (ref 0–3)
TTG IGG SER-ACNC: 4 U/ML (ref 0–5)

## 2023-01-17 ENCOUNTER — TELEPHONE (OUTPATIENT)
Dept: BARIATRICS | Facility: CLINIC | Age: 53
End: 2023-01-17

## 2023-01-17 DIAGNOSIS — R79.89 ELEVATED LIVER FUNCTION TESTS: Primary | ICD-10-CM

## 2023-01-17 NOTE — TELEPHONE ENCOUNTER
----- Message from Tu Lechuga sent at 1/16/2023  5:21 PM EST -----  Please let the patient know I have reviewed her A1C and fasting insulin and they were both within normal limits

## 2023-01-20 ENCOUNTER — OFFICE VISIT (OUTPATIENT)
Dept: BARIATRICS | Facility: CLINIC | Age: 53
End: 2023-01-20

## 2023-01-20 VITALS — HEIGHT: 66 IN | WEIGHT: 185.3 LBS | BODY MASS INDEX: 29.78 KG/M2

## 2023-01-20 DIAGNOSIS — R63.5 ABNORMAL WEIGHT GAIN: ICD-10-CM

## 2023-01-25 NOTE — PROGRESS NOTES
Weight Management Medical Nutrition Assessment  Chichi Garduno presented for a 1 month f/u visit with the [facility] Program  Today's weight is 181 6#  She lost 3 7# x 2 weeks (2%TBW)  Chichi Garduno reports monitoring portion sizes when dining out  She ate half of her meal and packaged the rest of the meal to bring home  Hydration improving  Encouraged water intake  She plans to incorporate daily walking on the treadmill  Reviewed estimated needs on active days  We reviewed SECA results  Keep up the good work!     Patient seen by Medical Provider in past 6 months:  yes  Requested to schedule appointment with Medical Provider: No      Anthropometric Measurements  Start Weight (#): 185 3# (186lb 1/4/23)  Current Weight (#): 181 6#  TBW % Change from start weight: 2%  Ideal Body Weight (#): 127 5#  Goal Weight (#): 140-145#  Highest:   Lowest:    Weight Loss History  Previous weight loss attempts: Self Created Diets (Portion Control, Healthy Food Choices, etc )    Food and Nutrition Related History  Wake up: 5:30AM  Bed Time: 11PM    Food Recall  Breakfast: 6:00/6:30AM Nutrisystem protein drink w/ almond milk and banana     Snack: Aixa coffee w/ caramel shot, whole milk, 1 sugar (sips on it all day)  Lunch: 12-1:30PM Nut[employer]ystem hot pocket (180 calories)  Snack: skip  Dinner: 6/6:30PM Parmesan chicken w/ pasta OR peas, mushrooms, pasta, chicken, and lemon caper sauce   Snack: 8:30/9PM Nutrisystem muffin or cupcake (150 calories/snack)    Beverages: 32oz water, 1 cup almond milk, 1 [place] Hill or [place] unsweetened iced tea and medium Aixa iced coffee w/ caramel and milk  Alcohol: rare  Volume of beverage intake: Approximately 50-60 oz    Weekends: similar (sometimes grab a breakfast sandwich)  Cravings: sweets  Trouble area of day: late evening    Frequency of Eating out: 2-3x/week  Food restrictions: none  Cooking:   Food Shopping: self    Physical Activity Intake  Activity: sedentary  Frequency:never  Physical limitations/barriers to exercise: none    Estimated Needs  Energy  [place] 1898 Energy Needs: BMR : 8428   1-2# loss weekly sedentary:  713-1213            1-2# loss weekly lightly active: 963-1463  Maintenance calories for sedentary activity level: 1713  Protein: 70-87gm      (1 2-1 5g/kg IBW)  Fluid: 70oz     (35mL/kg IBW)    Nutrition Diagnosis  Yes; Overweight/obesity  related to Excess energy intake as evidenced by  BMI more than normative standard for age and sex (overweight 25-29  9)       Nutrition Intervention    Nutrition Prescription  Calories: 1097-4186 calories (flex up on active days)  Protein: 70-87gm  Fluid: 70oz    Meal Plan (Bryan/Pro/Carb)  Breakfast: 200-250/15  Snack: 150/5  Lunch: 300-350/20  Snack: skip  Dinner: 350-400/25  Snack: 100-150/5-10    Nutrition Education:    Healthy Core Manual  Calorie controlled menu  Lean protein food choices  Healthy snack options  Food journaling tips      Nutrition Counseling:  Strategies: meal planning, portion sizes, healthy snack choices, hydration, fiber intake, protein intake, exercise, food journal      Monitoring and Evaluation:  Evaluation criteria:  Energy Intake  Meet protein needs  Maintain adequate hydration  Monitor weekly weight  Meal planning/preparation  Food journal   Decreased portions at mealtimes and snacks  Physical activity     Barriers to learning:none  Readiness to change: Action:  (Changing behavior)  Comprehension: good  Expected Compliance: good

## 2023-01-26 ENCOUNTER — CLINICAL SUPPORT (OUTPATIENT)
Dept: BARIATRICS | Facility: CLINIC | Age: 53
End: 2023-01-26

## 2023-01-26 VITALS — WEIGHT: 182 LBS | HEIGHT: 66 IN | BODY MASS INDEX: 29.25 KG/M2

## 2023-01-26 DIAGNOSIS — R63.5 ABNORMAL WEIGHT GAIN: Primary | ICD-10-CM

## 2023-01-29 DIAGNOSIS — E03.9 HYPOTHYROIDISM, UNSPECIFIED TYPE: ICD-10-CM

## 2023-01-30 RX ORDER — LEVOTHYROXINE SODIUM 88 UG/1
TABLET ORAL
Qty: 90 TABLET | Refills: 3 | Status: SHIPPED | OUTPATIENT
Start: 2023-01-30

## 2023-02-02 ENCOUNTER — CLINICAL SUPPORT (OUTPATIENT)
Dept: BARIATRICS | Facility: CLINIC | Age: 53
End: 2023-02-02

## 2023-02-02 ENCOUNTER — OFFICE VISIT (OUTPATIENT)
Dept: BARIATRICS | Facility: CLINIC | Age: 53
End: 2023-02-02

## 2023-02-02 VITALS — HEIGHT: 66 IN | BODY MASS INDEX: 29.18 KG/M2 | WEIGHT: 181.6 LBS

## 2023-02-02 VITALS — BODY MASS INDEX: 31.21 KG/M2 | HEIGHT: 64 IN | WEIGHT: 182.8 LBS

## 2023-02-02 DIAGNOSIS — R63.5 ABNORMAL WEIGHT GAIN: Primary | ICD-10-CM

## 2023-02-16 ENCOUNTER — CLINICAL SUPPORT (OUTPATIENT)
Dept: BARIATRICS | Facility: CLINIC | Age: 53
End: 2023-02-16

## 2023-02-16 VITALS — HEIGHT: 66 IN | BODY MASS INDEX: 28.96 KG/M2 | WEIGHT: 180.2 LBS

## 2023-02-16 DIAGNOSIS — R63.5 ABNORMAL WEIGHT GAIN: Primary | ICD-10-CM

## 2023-02-28 ENCOUNTER — TELEPHONE (OUTPATIENT)
Dept: BARIATRICS | Facility: CLINIC | Age: 53
End: 2023-02-28

## 2023-03-13 ENCOUNTER — OFFICE VISIT (OUTPATIENT)
Dept: GASTROENTEROLOGY | Facility: CLINIC | Age: 53
End: 2023-03-13

## 2023-03-13 VITALS
TEMPERATURE: 97.5 F | WEIGHT: 180 LBS | DIASTOLIC BLOOD PRESSURE: 82 MMHG | BODY MASS INDEX: 28.93 KG/M2 | SYSTOLIC BLOOD PRESSURE: 118 MMHG | HEIGHT: 66 IN

## 2023-03-13 DIAGNOSIS — K76.0 FATTY LIVER: ICD-10-CM

## 2023-03-13 DIAGNOSIS — R79.89 ELEVATED LIVER FUNCTION TESTS: ICD-10-CM

## 2023-03-13 DIAGNOSIS — K21.9 GASTROESOPHAGEAL REFLUX DISEASE WITHOUT ESOPHAGITIS: Primary | ICD-10-CM

## 2023-03-13 DIAGNOSIS — E66.9 OBESITY, CLASS I, BMI 30-34.9: ICD-10-CM

## 2023-03-13 NOTE — PROGRESS NOTES
[facility] - Outpatient Follow-up Note  Dunia Exon 45 y o  female MRN: [id number]  Encounter: [id number]          ASSESSMENT AND PLAN:  45year old female here for follow up    1  Gastroesophageal reflux disease without esophagitis  -recent egd without erosive esophagitis  -continue omeprazole 20 mg; take 30 min before a meal, same time everyday     2  Fatty liver  -seen on ultrasound, pt is losing weight    3  Obesity, Class I, BMI 30-34 9  -trying to lose weight and has lost 9 lbs, great news  -continue the good work      4  Elevated liver function tests  -now normalized    Follow up in six months time     ______________________________________________________________    SUBJECTIVE:  45year old female here for follow up of GERD  She is taking omeprazole and saw weight management and is motivated to lose weight and has lost about 9 lbs  LFT's have normalized  REVIEW OF SYSTEMS IS OTHERWISE NEGATIVE        Historical Information   Past Medical History:   Diagnosis Date   • Disease of thyroid gland    • Graves disease    • Headache(784 0)    • Herpes simplex infection    • Migraine without aura and responsive to treatment    • Postablative hypothyroidism    • Varicella without complication      Past Surgical History:   Procedure Laterality Date   • HYSTERECTOMY      age 34   • MYOMECTOMY     • NEUROPLASTY / TRANSPOSITION MEDIAN NERVE AT CARPAL TUNNEL Right    • TOOTH EXTRACTION     • UPPER GASTROINTESTINAL ENDOSCOPY       Social History   Social History     Substance and Sexual Activity   Alcohol Use Yes    Comment: drink a few glasses of wine on special occasions     Social History     Substance and Sexual Activity   Drug Use No     Social History     Tobacco Use   Smoking Status Never   Smokeless Tobacco Never     Family History   Problem Relation Age of Onset   • Depression Mother    • Migraines Mother    • Osteopenia Mother    • Aortic aneurysm Father    • Hyperlipidemia Father    • Hypertension Father    • Heart disease Father         ischemic   • Thyroid disease Father    • Stroke Father    • Transient ischemic attack Father    • Anxiety disorder Brother    • Depression Brother    • Lung cancer Maternal Uncle         70's   • Skin cancer Maternal Uncle         70's   • No Known Problems Paternal Aunt    • Thyroid disease Paternal Uncle    • Diabetes Maternal Grandmother    • Stroke Maternal Grandmother    • Skin cancer Maternal Grandfather         [de-identified] or 80's   • Stroke Maternal Grandfather    • Osteoporosis Paternal Grandmother    • No Known Problems Paternal Grandfather    • Anxiety disorder Son    • ADD / ADHD Son    • Thyroid disease Cousin        Meds/Allergies       Current Outpatient Medications:   •  baclofen 10 mg tablet  •  bisacodyl (DULCOLAX) 5 mg EC tablet  •  ergocalciferol (VITAMIN D2) 50,000 units  •  levothyroxine 88 mcg tablet  •  meclizine (ANTIVERT) 25 mg tablet  •  Multiple Vitamins-Minerals (DAILY MULTI PO)  •  omeprazole (PriLOSEC) 20 mg delayed release capsule  •  sertraline (ZOLOFT) 50 mg tablet  •  PEG 3350-KCl-NaCl-NaSulf-Na Asc-C (MOVIPREP) 100 g    No Known Allergies        Objective     Blood pressure 118/82, temperature 97 5 °F (36 4 °C), temperature source Tympanic, height 5' 5 5" (1 664 m), weight 81 6 kg (180 lb), not currently breastfeeding  Body mass index is 29 5 kg/m²  PHYSICAL EXAM:      General Appearance:   Alert, cooperative, no distress   HEENT:   Normocephalic, atraumatic, anicteric      Neck:  Supple, symmetrical, trachea midline   Lungs:   Clear to auscultation bilaterally; no rales, rhonchi or wheezing; respirations unlabored    Heart[de-identified]   Regular rate and rhythm; no murmur, rub, or gallop     Abdomen:   Soft, non-tender, non-distended; normal bowel sounds; no masses, no organomegaly    Genitalia:   Deferred    Rectal:   Deferred    Extremities:  No cyanosis, clubbing or edema    Pulses:  2+ and symmetric    Skin:  No jaundice, rashes, or lesions    Lymph nodes:  No palpable cervical lymphadenopathy        Lab Results:     No visits with results within 1 Day(s) from this visit  Latest known visit with results is:   Appointment on 01/13/2023   Component Date Value   • Total Bilirubin 01/13/2023 0 61    • Bilirubin, Direct 01/13/2023 0 14    • Alkaline Phosphatase 01/13/2023 138 (H)    • AST 01/13/2023 19    • ALT 01/13/2023 27    • Total Protein 01/13/2023 8 4    • Albumin 01/13/2023 3 8    • Hepatitis B Surface Ag 01/13/2023 Non-reactive    • Hep A IgM 01/13/2023 Non-reactive    • Hepatitis C Ab 01/13/2023 Non-reactive    • Hep B C IgM 01/13/2023 Non-reactive    • Hemoglobin A1C 01/13/2023 5 3    • EAG 01/13/2023 105    • Insulin, Fasting 01/13/2023 9 2    • IgA 01/13/2023 165    • Gliadin IgA 01/13/2023 7    • Gliadin IgG 01/13/2023 8    • Tissue Transglut Ab IGG 01/13/2023 4    • TISSUE TRANSGLUTAMINASE * 01/13/2023 <2    • Endomysial IgA 01/13/2023 Negative      Radiology Results:   No results found  Answers for HPI/ROS submitted by the patient on 3/10/2023  Chronicity: recurrent  Onset: more than 1 year ago  Onset quality: undetermined  Frequency: intermittently  Progression since onset: resolved  Pain location: RLQ  Pain - numeric: 0/10  Radiates to: does not radiate  anorexia: No  arthralgias: No  belching: No  constipation: No  diarrhea: No  dysuria: No  fever: No  flatus: No  frequency: No  headaches: No  hematochezia: No  hematuria: No  melena: No  myalgias: No  nausea:  No  weight loss: No  vomiting: No  Aggravated by: nothing  Relieved by: nothing  Diagnostic workup: lower endoscopy, ultrasound, upper endoscopy

## 2023-04-24 DIAGNOSIS — F41.9 ANXIETY: ICD-10-CM

## 2023-04-26 ENCOUNTER — RA CDI HCC (OUTPATIENT)
Dept: OTHER | Facility: HOSPITAL | Age: 53
End: 2023-04-26

## 2023-04-26 NOTE — PROGRESS NOTES
[facility]  coding opportunities       Chart reviewed, no opportunity found: CHART REVIEWED, NO OPPORTUNITY FOUND        Patients Insurance        Commercial Insurance: [address] [place] [address]

## 2023-05-08 ENCOUNTER — TELEPHONE (OUTPATIENT)
Dept: BARIATRICS | Facility: CLINIC | Age: 53
End: 2023-05-08

## 2023-06-28 ENCOUNTER — TELEPHONE (OUTPATIENT)
Dept: NEUROLOGY | Facility: CLINIC | Age: 53
End: 2023-06-28

## 2023-06-28 NOTE — TELEPHONE ENCOUNTER
Called and spoke to patient  Patient confirmed upcoming apt with Hakeem Mccartney PA-C on 7/5/23 @ 7:30am in the [facility]

## 2023-07-05 ENCOUNTER — OFFICE VISIT (OUTPATIENT)
Dept: NEUROLOGY | Facility: CLINIC | Age: 53
End: 2023-07-05
Payer: COMMERCIAL

## 2023-07-05 VITALS
WEIGHT: 174 LBS | RESPIRATION RATE: 18 BRPM | DIASTOLIC BLOOD PRESSURE: 74 MMHG | OXYGEN SATURATION: 96 % | SYSTOLIC BLOOD PRESSURE: 104 MMHG | HEART RATE: 63 BPM | BODY MASS INDEX: 27.97 KG/M2 | HEIGHT: 66 IN | TEMPERATURE: 97.8 F

## 2023-07-05 DIAGNOSIS — G44.209 TENSION HEADACHE: ICD-10-CM

## 2023-07-05 DIAGNOSIS — R25.3 MUSCLE TWITCHING: Primary | ICD-10-CM

## 2023-07-05 DIAGNOSIS — H93.12 TINNITUS OF LEFT EAR: ICD-10-CM

## 2023-07-05 PROCEDURE — 99214 OFFICE O/P EST MOD 30 MIN: CPT | Performed by: PHYSICIAN ASSISTANT

## 2023-07-05 NOTE — ASSESSMENT & PLAN NOTE
Patient with intermittent muscle twitching in all of the extremities, usually worse when she is trying to fall asleep or relaxing her body.  EMG was normal with no evidence to support a generalized neurogenic or myopathic process.     No evidence of atrophy, fasciculations or weakness on exam.     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  Reassurance given that her neurologic workup is unremarkable for a more concerning neuromuscular disorder.     Encouraged the patient to stay hydrated, get proper rest, limit caffeine/stimulants.

## 2023-07-05 NOTE — ASSESSMENT & PLAN NOTE
More recently has noticed intermittent "whooshing" sound in the left ear. She does not have HTN, BP well controlled. She planned to discuss this with PCP at her upcoming appt this month. In the meantime, will order carotid doppler.

## 2023-07-05 NOTE — PROGRESS NOTES
Patient ID: Yuniel Schumacher is a 45 y.o. female. Assessment/Plan:    Muscle twitching  Patient with intermittent muscle twitching in all of the extremities, usually worse when she is trying to fall asleep or relaxing her body.  EMG was normal with no evidence to support a generalized neurogenic or myopathic process.     No evidence of atrophy, fasciculations or weakness on exam.     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  Reassurance given that her neurologic workup is unremarkable for a more concerning neuromuscular disorder.     Encouraged the patient to stay hydrated, get proper rest, limit caffeine/stimulants.      Tension headache  Overall well controlled currently. Dotty Austin is taking baclofen 20mg daily, and using magnesium and B2 supplements regularly.  She uses Excedrin at onset of HA if needed. Reminded patient to limit use of OTC NSAIDs/tylenol to no more than 2-3 times a week to prevent rebound HA.  Encouraged maintaining proper hydration, eating regularly/not skipping meals and getting adequate sleep. We discussed keeping track/logging headaches to see if any specific triggers can be identified. Tinnitus of left ear  More recently has noticed intermittent "whooshing" sound in the left ear. She does not have HTN, BP well controlled. She planned to discuss this with PCP at her upcoming appt this month. In the meantime, will order carotid doppler. Patient will return in 6-8 months or sooner if needed     Diagnoses and all orders for this visit:    Muscle twitching    Tension headache    Tinnitus of left ear  -     VAS carotid complete study; Future           Subjective:    HPI    Patient is a 45year old right handed female who presents today for neurologic follow up. She was last seen in November 2022. To review, she was initially evaluated by Dr. Ale Dillon on 6/15/2020 for several issues.   Patient described that for the past 8-9 months she had been experiencing muscle twitching. She described twitching proximally in any of the 4 extremities although occasionally will occur in the hands when she is working on the computer. She stated this was particularly apparent when resting. She had no associated weakness or sensory symptoms. No symptoms to suggest a Lhermitte's phenomenon. No change in bowel or bladder function, speech or swallowing. In reviewing past studies, she was found to be vitamin-D deficient. She was put on a supplement. Her father has Parkinson's disease. However, she is unaware of any family member with neuro muscular disease. She also described headaches. These date back a long time were getting worse over the past 8-9 months as well. These are described as generally right frontoparietal in location. She describes these predominantly as tightness, with rare modest pulsatile component. These have been occurring in the recent months on average of every other day and she does take Excedrin 2 tablets generally every other day, suggesting the possibility analgesic overuse effect as well. Labs completed 8/13/2020 with normal CK, slightly low aldolase, slightly elevated TSH at 4.25 with normal FT4 1.12, normal magnesium, low vit D (29), normal folate, normal ionized calcium, essentially unremarkable CBC and CMP. EMG of the LUE/LLE completed 10/14/2020 by Dr. Mansi Osuna was a normal study with no evidence of a generalized neurogenic or myopathic process effecting the peripheral nervous system. At timing of visit in Feb 2021 she reported some issues with vertigo. She has a history of BPPV, but more recently had experienced some episodes of vertigo associated with head pressure that seemed to be brought on by stress. This did not occur with head movement. Also with some imbalance. MRI brain with IACs w and wo contrast was completed 4/14/2021. Normal appearance of the IACs.   Scattered FLAIR and T2 foci are seen most compatible with chronic microangiopathic changes in patients of this age group. Today, patient reports she is stable. The twitching is the same, no changes. Headaches are also stable, mild, and relieved with Excedrin. Once in a while she gets a cluster of headaches, but not very frequently. She has yet to identify any triggers. She feels she stays well hydrated, drinks 1 iced coffee per day. She is eating regularly. She sleeps well at night. She denies any vision changes, speech or swallowing difficulty, bowel or bladder changes. No recent bouts of vertigo. She does report occasionally getting a “whooshing” sound in her left ear, occurring on and off for the last 1-2 months. She has an appt with PCP coming up soon and was planning to address there. She continues to use baclofen 20mg daily. The following portions of the patient's history were reviewed and updated as appropriate: current medications, past family history, past medical history, past social history, past surgical history and problem list.         Objective:    Blood pressure 104/74, pulse 63, temperature 97.8 °F (36.6 °C), temperature source Temporal, resp. rate 18, height 5' 5.5" (1.664 m), weight 78.9 kg (174 lb), SpO2 96 %, not currently breastfeeding. Physical Exam  Constitutional:       Appearance: Normal appearance. HENT:      Head: Normocephalic and atraumatic. Eyes:      Extraocular Movements: EOM normal.      Pupils: Pupils are equal, round, and reactive to light. Neurological:      Mental Status: She is alert. Motor: Motor strength is normal.     Deep Tendon Reflexes: Reflexes are normal and symmetric. Psychiatric:         Mood and Affect: Mood normal.         Speech: Speech normal.         Behavior: Behavior normal.         Neurological Exam  Mental Status  Alert. Oriented to person, place, time and situation. Speech is normal. Language is fluent with no aphasia.  Attention and concentration are normal.    Cranial Nerves  CN II: Visual fields full to confrontation. CN III, IV, VI: Extraocular movements intact bilaterally. Pupils equal round and reactive to light bilaterally. CN V: Facial sensation is normal.  CN VII: Full and symmetric facial movement. CN VIII: Hearing is normal.  CN IX, X: Palate elevates symmetrically  CN XI: Shoulder shrug strength is normal.  CN XII: Tongue midline without atrophy or fasciculations. Motor   No fasciculations present. Normal muscle tone. No abnormal involuntary movements. Strength is 5/5 throughout all four extremities. Sensory  Light touch is normal in upper and lower extremities. Reflexes  Deep tendon reflexes are 2+ and symmetric in all four extremities. Coordination  Right: Finger-to-nose normal.Left: Finger-to-nose normal.    Gait  Casual gait is normal including stance, stride, and arm swing. ROS:    Review of Systems   Constitutional: Negative for chills and fever. HENT: Positive for ear pain (wooshing in left ear). Negative for sore throat. Eyes: Negative for pain and visual disturbance. Respiratory: Negative for cough and shortness of breath. Cardiovascular: Negative for chest pain and palpitations. Gastrointestinal: Negative for abdominal pain and vomiting. Genitourinary: Negative for dysuria and hematuria. Musculoskeletal: Positive for back pain (lower back). Negative for arthralgias and gait problem. Skin: Negative for color change and rash. Neurological: Positive for tremors and headaches (clusters 2 weeks ago). Negative for dizziness, seizures and syncope. Hematological: Bruises/bleeds easily. All other systems reviewed and are negative.     I personally reviewed and updated the ROS as appropriate

## 2023-07-05 NOTE — ASSESSMENT & PLAN NOTE
Overall well controlled currently. Daniela Loyd is taking baclofen 20mg daily, and using magnesium and B2 supplements regularly.  She uses Excedrin at onset of HA if needed. Reminded patient to limit use of OTC NSAIDs/tylenol to no more than 2-3 times a week to prevent rebound HA.  Encouraged maintaining proper hydration, eating regularly/not skipping meals and getting adequate sleep. We discussed keeping track/logging headaches to see if any specific triggers can be identified.

## 2023-07-05 NOTE — PATIENT INSTRUCTIONS
Continue baclofen  Make sure you are staying well hydrated, eating regularly, getting good sleep.   Try to keep track of headaches and see what you ate around that time, weather, sleep patterns, hydration etc  Will order carotid ultrasound and also bring up the sound in your eat with PCP  Follow up in 6-8 months or sooner if needed

## 2023-08-17 ENCOUNTER — HOSPITAL ENCOUNTER (OUTPATIENT)
Dept: NON INVASIVE DIAGNOSTICS | Facility: HOSPITAL | Age: 53
Discharge: HOME | End: 2023-08-17
Payer: COMMERCIAL

## 2023-08-17 DIAGNOSIS — H93.12 TINNITUS OF LEFT EAR: ICD-10-CM

## 2023-08-17 PROCEDURE — 93880 EXTRACRANIAL BILAT STUDY: CPT | Performed by: SURGERY

## 2023-08-17 PROCEDURE — 93880 EXTRACRANIAL BILAT STUDY: CPT

## 2023-09-08 ENCOUNTER — RA CDI HCC (OUTPATIENT)
Dept: OTHER | Facility: HOSPITAL | Age: 53
End: 2023-09-08

## 2023-09-08 NOTE — PROGRESS NOTES
[facility] [address] [facility] coding opportunities       Chart reviewed, no opportunity found: CHART REVIEWED, NO OPPORTUNITY FOUND     Patients Insurance     Commercial Insurance: [employer]

## 2023-09-12 NOTE — TELEPHONE ENCOUNTER
Nurse to nurse given to Kwabena Kirkpatrick, RN and Edilson Lee RN.       Diana Roland  09/12/23 2210 Pt confirmed 04/29/22 appt

## 2023-09-14 ENCOUNTER — OFFICE VISIT (OUTPATIENT)
Dept: FAMILY MEDICINE CLINIC | Facility: CLINIC | Age: 53
End: 2023-09-14
Payer: COMMERCIAL

## 2023-09-14 VITALS
WEIGHT: 174.8 LBS | OXYGEN SATURATION: 96 % | SYSTOLIC BLOOD PRESSURE: 118 MMHG | TEMPERATURE: 98.3 F | HEIGHT: 64 IN | DIASTOLIC BLOOD PRESSURE: 80 MMHG | HEART RATE: 68 BPM | BODY MASS INDEX: 29.84 KG/M2

## 2023-09-14 DIAGNOSIS — R51.9 PERSISTENT HEADACHES: ICD-10-CM

## 2023-09-14 DIAGNOSIS — K21.9 GASTROESOPHAGEAL REFLUX DISEASE WITHOUT ESOPHAGITIS: ICD-10-CM

## 2023-09-14 DIAGNOSIS — R42 VERTIGO: ICD-10-CM

## 2023-09-14 DIAGNOSIS — E55.9 VITAMIN D DEFICIENCY: ICD-10-CM

## 2023-09-14 DIAGNOSIS — Z00.00 ANNUAL PHYSICAL EXAM: Primary | ICD-10-CM

## 2023-09-14 DIAGNOSIS — Z12.31 BREAST CANCER SCREENING BY MAMMOGRAM: ICD-10-CM

## 2023-09-14 DIAGNOSIS — E78.00 ELEVATED CHOLESTEROL: ICD-10-CM

## 2023-09-14 DIAGNOSIS — E03.9 HYPOTHYROIDISM, UNSPECIFIED TYPE: ICD-10-CM

## 2023-09-14 DIAGNOSIS — E66.9 OBESITY, CLASS I, BMI 30-34.9: ICD-10-CM

## 2023-09-14 PROCEDURE — 99396 PREV VISIT EST AGE 40-64: CPT | Performed by: FAMILY MEDICINE

## 2023-09-14 RX ORDER — LEVOTHYROXINE SODIUM 88 UG/1
88 TABLET ORAL DAILY
Qty: 90 TABLET | Refills: 3 | Status: SHIPPED | OUTPATIENT
Start: 2023-09-14

## 2023-09-14 RX ORDER — ERGOCALCIFEROL 1.25 MG/1
50000 CAPSULE ORAL WEEKLY
Qty: 12 CAPSULE | Refills: 3 | Status: SHIPPED | OUTPATIENT
Start: 2023-09-14

## 2023-09-14 RX ORDER — OMEPRAZOLE 20 MG/1
20 CAPSULE, DELAYED RELEASE ORAL DAILY
Qty: 90 CAPSULE | Refills: 3 | Status: SHIPPED | OUTPATIENT
Start: 2023-09-14

## 2023-09-14 RX ORDER — MECLIZINE HYDROCHLORIDE 25 MG/1
25 TABLET ORAL EVERY 8 HOURS PRN
Qty: 100 TABLET | Refills: 0 | Status: SHIPPED | OUTPATIENT
Start: 2023-09-14

## 2023-09-14 RX ORDER — BACLOFEN 10 MG/1
20 TABLET ORAL DAILY
Qty: 180 TABLET | Refills: 3 | Status: SHIPPED | OUTPATIENT
Start: 2023-09-14

## 2023-09-14 NOTE — PROGRESS NOTES
ADULT ANNUAL [facility] [employer]    NAME: Rowdy Nicolas  AGE: 45 y.o. SEX: female  : 1970     DATE: 2023     Assessment and Plan:   Patient is 27-year-old female seen for physical exam.  Problem List Items Addressed This Visit     Esophageal reflux    Relevant Medications    omeprazole (PriLOSEC) 20 mg delayed release capsule    Hypothyroidism    Relevant Medications    levothyroxine 88 mcg tablet    Other Relevant Orders    CBC and differential (Completed)    Comprehensive metabolic panel (Completed)    TSH, 3rd generation with Free T4 reflex (Completed)    Vertigo    Relevant Medications    meclizine (ANTIVERT) 25 mg tablet    Obesity, Class I, BMI 30-34.9   Other Visit Diagnoses     Annual physical exam    -  Primary    Vitamin D deficiency        Relevant Medications    ergocalciferol (VITAMIN D2) 50,000 units    Other Relevant Orders    Vitamin D 25 hydroxy (Completed)    Persistent headaches        Relevant Medications    baclofen 10 mg tablet    Breast cancer screening by mammogram        Relevant Orders    Mammo screening bilateral w 3d & cad    Elevated cholesterol        Relevant Orders    Lipid Panel with Direct LDL reflex (Completed)          Immunizations and preventive care screenings were discussed with patient today. Appropriate education was printed on patient's after visit summary. Counseling:  Alcohol/drug use: discussed moderation in alcohol intake, the recommendations for healthy alcohol use, and avoidance of illicit drug use. Depression Screening and Follow-up Plan: Patient was screened for depression during today's encounter. They screened negative with a PHQ-2 score of 0. Return in about 1 year (around 2024).   May need to start statin - carotid plaque   Chief Complaint:     Chief Complaint   Patient presents with   • Physical Exam     Referral for mammo, refill meds      History of Present Illness:     Adult Annual Physical   Patient here for a comprehensive physical exam. The patient reports no problems. She is still using [employer]. Diet and Physical Activity  Diet/Nutrition:  Nutrisystem . Exercise: 1-2 times a week on average. Depression Screening  PHQ-2/9 Depression Screening    Little interest or pleasure in doing things: 0 - not at all  Feeling down, depressed, or hopeless: 0 - not at all  PHQ-2 Score: 0  PHQ-2 Interpretation: Negative depression screen       General Health  Sleep: sleeps well. Hearing: normal - bilateral.  Vision: wears contacts. Dental: regular dental visits. /GYN Health  Patient is: postmenopausal  Last menstrual period: hysterectomy       Review of Systems:     Review of Systems   Constitutional:  Negative for chills and fever. HENT:  Negative for congestion and sore throat. Respiratory:  Negative for chest tightness. Cardiovascular:  Negative for chest pain and palpitations. Gastrointestinal:  Negative for abdominal pain, constipation, diarrhea and nausea. Genitourinary:  Negative for difficulty urinating. Skin: Negative. Neurological:  Positive for headaches (no proble as long as she takes Baclofen). Negative for dizziness. Psychiatric/Behavioral: Negative.         Past Medical History:     Past Medical History:   Diagnosis Date   • Disease of thyroid gland    • Graves disease    • Headache(784.0)    • Herpes simplex infection    • Migraine without aura and responsive to treatment    • Postablative hypothyroidism    • Varicella without complication       Past Surgical History:     Past Surgical History:   Procedure Laterality Date   • HYSTERECTOMY      age 34   • MYOMECTOMY     • NEUROPLASTY / TRANSPOSITION MEDIAN NERVE AT CARPAL TUNNEL Right    • TOOTH EXTRACTION     • UPPER GASTROINTESTINAL ENDOSCOPY        Social History:     Social History     Socioeconomic History   • Marital status: /Civil Union     Spouse name: None • Number of children: None   • Years of education: None   • Highest education level: None   Occupational History   • None   Tobacco Use   • Smoking status: Never   • Smokeless tobacco: Never   Vaping Use   • Vaping Use: Never used   Substance and Sexual Activity   • Alcohol use: Yes     Comment: drink a few glasses of wine on special occasions   • Drug use: No   • Sexual activity: Not Currently     Partners: Male     Birth control/protection: Surgical   Other Topics Concern   • None   Social History Narrative   • None     Social Determinants of Health     Financial Resource Strain: Not on file   Food Insecurity: Not on file   Transportation Needs: Not on file   Physical Activity: Not on file   Stress: Not on file   Social Connections: Not on file   Intimate Partner Violence: Not on file   Housing Stability: Not on file      Family History:     Family History   Problem Relation Age of Onset   • Depression Mother    • Migraines Mother    • Osteopenia Mother    • Aortic aneurysm Father    • Hyperlipidemia Father    • Hypertension Father    • Heart disease Father         ischemic   • Thyroid disease Father    • Stroke Father    • Transient ischemic attack Father    • Anxiety disorder Brother    • Depression Brother    • Lung cancer Maternal Uncle         70's   • Skin cancer Maternal Uncle         70's   • No Known Problems Paternal Aunt    • Thyroid disease Paternal Uncle    • Diabetes Maternal Grandmother    • Stroke Maternal Grandmother    • Skin cancer Maternal Grandfather         80's or 80's   • Stroke Maternal Grandfather    • Osteoporosis Paternal Grandmother    • No Known Problems Paternal Grandfather    • Anxiety disorder Son    • ADD / ADHD Son    • Thyroid disease Cousin       Current Medications:     Current Outpatient Medications   Medication Sig Dispense Refill   • baclofen 10 mg tablet Take 2 tablets (20 mg total) by mouth daily 180 tablet 3   • bisacodyl (DULCOLAX) 5 mg EC tablet Take 2 tablets (10 mg total) by mouth daily as needed for constipation for up to 1 dose 30 tablet 0   • ergocalciferol (VITAMIN D2) 50,000 units Take 1 capsule (50,000 Units total) by mouth once a week 12 capsule 3   • levothyroxine 88 mcg tablet Take 1 tablet (88 mcg total) by mouth daily 90 tablet 3   • meclizine (ANTIVERT) 25 mg tablet Take 1 tablet (25 mg total) by mouth every 8 (eight) hours as needed for dizziness 100 tablet 0   • Multiple Vitamins-Minerals (DAILY MULTI PO) Take by mouth     • omeprazole (PriLOSEC) 20 mg delayed release capsule Take 1 capsule (20 mg total) by mouth daily 90 capsule 3   • sertraline (ZOLOFT) 50 mg tablet TAKE 1 TABLET DAILY 90 tablet 3     No current facility-administered medications for this visit. Allergies:     No Known Allergies   Physical Exam:     /80 (BP Location: Left arm, Patient Position: Sitting, Cuff Size: Standard)   Pulse 68   Temp 98.3 °F (36.8 °C) (Temporal)   Ht 5' 4" (1.626 m)   Wt 79.3 kg (174 lb 12.8 oz)   LMP  (LMP Unknown)   SpO2 96%   BMI 30.00 kg/m²     Physical Exam  Vitals and nursing note reviewed. Constitutional:       General: She is not in acute distress. Appearance: She is well-developed. HENT:      Head: Normocephalic. Right Ear: Tympanic membrane normal.      Left Ear: Tympanic membrane normal.      Mouth/Throat:      Pharynx: No posterior oropharyngeal erythema. Eyes:      General: No scleral icterus. Neck:      Thyroid: No thyromegaly. Vascular: No carotid bruit. Cardiovascular:      Rate and Rhythm: Normal rate and regular rhythm. Heart sounds: Normal heart sounds. No murmur heard. Pulmonary:      Effort: Pulmonary effort is normal.      Breath sounds: Normal breath sounds. Abdominal:      General: Bowel sounds are normal.      Palpations: Abdomen is soft. Musculoskeletal:      Right lower leg: No edema. Left lower leg: No edema. Lymphadenopathy:      Cervical: No cervical adenopathy.    Skin:     General: Skin is warm and dry. Neurological:      Mental Status: She is alert and oriented to person, place, and time.    Psychiatric:         Mood and Affect: Mood normal.          Elenita Dickens, DO  [address]

## 2023-10-03 ENCOUNTER — APPOINTMENT (OUTPATIENT)
Dept: LAB | Facility: CLINIC | Age: 53
End: 2023-10-03
Payer: COMMERCIAL

## 2023-10-03 DIAGNOSIS — E03.9 HYPOTHYROIDISM, UNSPECIFIED TYPE: ICD-10-CM

## 2023-10-03 DIAGNOSIS — E55.9 VITAMIN D DEFICIENCY: ICD-10-CM

## 2023-10-03 DIAGNOSIS — E78.00 ELEVATED CHOLESTEROL: ICD-10-CM

## 2023-10-03 LAB
25(OH)D3 SERPL-MCNC: 58.2 NG/ML (ref 30–100)
ALBUMIN SERPL BCP-MCNC: 4.2 G/DL (ref 3.5–5)
ALP SERPL-CCNC: 112 U/L (ref 34–104)
ALT SERPL W P-5'-P-CCNC: 25 U/L (ref 7–52)
ANION GAP SERPL CALCULATED.3IONS-SCNC: 8 MMOL/L
AST SERPL W P-5'-P-CCNC: 23 U/L (ref 13–39)
BASOPHILS # BLD AUTO: 0.06 THOUSANDS/ÂΜL (ref 0–0.1)
BASOPHILS NFR BLD AUTO: 1 % (ref 0–1)
BILIRUB SERPL-MCNC: 0.46 MG/DL (ref 0.2–1)
BUN SERPL-MCNC: 17 MG/DL (ref 5–25)
CALCIUM SERPL-MCNC: 9.6 MG/DL (ref 8.4–10.2)
CHLORIDE SERPL-SCNC: 107 MMOL/L (ref 96–108)
CHOLEST SERPL-MCNC: 235 MG/DL
CO2 SERPL-SCNC: 25 MMOL/L (ref 21–32)
CREAT SERPL-MCNC: 0.78 MG/DL (ref 0.6–1.3)
EOSINOPHIL # BLD AUTO: 0.21 THOUSAND/ÂΜL (ref 0–0.61)
EOSINOPHIL NFR BLD AUTO: 5 % (ref 0–6)
ERYTHROCYTE [DISTWIDTH] IN BLOOD BY AUTOMATED COUNT: 13 % (ref 11.6–15.1)
GFR SERPL CREATININE-BSD FRML MDRD: 86 ML/MIN/1.73SQ M
GLUCOSE P FAST SERPL-MCNC: 97 MG/DL (ref 65–99)
HCT VFR BLD AUTO: 43.2 % (ref 34.8–46.1)
HDLC SERPL-MCNC: 75 MG/DL
HGB BLD-MCNC: 13.7 G/DL (ref 11.5–15.4)
IMM GRANULOCYTES # BLD AUTO: 0.02 THOUSAND/UL (ref 0–0.2)
IMM GRANULOCYTES NFR BLD AUTO: 0 % (ref 0–2)
LDLC SERPL CALC-MCNC: 142 MG/DL (ref 0–100)
LYMPHOCYTES # BLD AUTO: 1.19 THOUSANDS/ÂΜL (ref 0.6–4.47)
LYMPHOCYTES NFR BLD AUTO: 26 % (ref 14–44)
MCH RBC QN AUTO: 29.5 PG (ref 26.8–34.3)
MCHC RBC AUTO-ENTMCNC: 31.7 G/DL (ref 31.4–37.4)
MCV RBC AUTO: 93 FL (ref 82–98)
MONOCYTES # BLD AUTO: 0.36 THOUSAND/ÂΜL (ref 0.17–1.22)
MONOCYTES NFR BLD AUTO: 8 % (ref 4–12)
NEUTROPHILS # BLD AUTO: 2.79 THOUSANDS/ÂΜL (ref 1.85–7.62)
NEUTS SEG NFR BLD AUTO: 60 % (ref 43–75)
NRBC BLD AUTO-RTO: 0 /100 WBCS
PLATELET # BLD AUTO: 273 THOUSANDS/UL (ref 149–390)
PMV BLD AUTO: 11.2 FL (ref 8.9–12.7)
POTASSIUM SERPL-SCNC: 4.2 MMOL/L (ref 3.5–5.3)
PROT SERPL-MCNC: 7.6 G/DL (ref 6.4–8.4)
RBC # BLD AUTO: 4.64 MILLION/UL (ref 3.81–5.12)
SODIUM SERPL-SCNC: 140 MMOL/L (ref 135–147)
TRIGL SERPL-MCNC: 89 MG/DL
TSH SERPL DL<=0.05 MIU/L-ACNC: 4.05 UIU/ML (ref 0.45–4.5)
WBC # BLD AUTO: 4.63 THOUSAND/UL (ref 4.31–10.16)

## 2023-10-03 PROCEDURE — 36415 COLL VENOUS BLD VENIPUNCTURE: CPT

## 2023-10-03 PROCEDURE — 84443 ASSAY THYROID STIM HORMONE: CPT

## 2023-10-03 PROCEDURE — 80053 COMPREHEN METABOLIC PANEL: CPT

## 2023-10-03 PROCEDURE — 85025 COMPLETE CBC W/AUTO DIFF WBC: CPT

## 2023-10-03 PROCEDURE — 80061 LIPID PANEL: CPT

## 2023-10-03 PROCEDURE — 82306 VITAMIN D 25 HYDROXY: CPT

## 2023-12-02 ENCOUNTER — HOSPITAL ENCOUNTER (OUTPATIENT)
Dept: MAMMOGRAPHY | Facility: CLINIC | Age: 53
Discharge: HOME | End: 2023-12-02
Payer: COMMERCIAL

## 2023-12-02 VITALS — BODY MASS INDEX: 29.73 KG/M2 | WEIGHT: 174.16 LBS | HEIGHT: 64 IN

## 2023-12-02 DIAGNOSIS — Z12.31 BREAST CANCER SCREENING BY MAMMOGRAM: ICD-10-CM

## 2023-12-02 PROCEDURE — 77067 SCR MAMMO BI INCL CAD: CPT

## 2023-12-02 PROCEDURE — 77063 BREAST TOMOSYNTHESIS BI: CPT

## 2023-12-08 ENCOUNTER — OFFICE VISIT (OUTPATIENT)
Dept: URGENT CARE | Facility: CLINIC | Age: 53
End: 2023-12-08
Payer: COMMERCIAL

## 2023-12-08 ENCOUNTER — TELEPHONE (OUTPATIENT)
Dept: FAMILY MEDICINE CLINIC | Facility: CLINIC | Age: 53
End: 2023-12-08

## 2023-12-08 VITALS
DIASTOLIC BLOOD PRESSURE: 70 MMHG | HEART RATE: 71 BPM | OXYGEN SATURATION: 98 % | SYSTOLIC BLOOD PRESSURE: 120 MMHG | TEMPERATURE: 97.9 F | RESPIRATION RATE: 16 BRPM

## 2023-12-08 DIAGNOSIS — R30.0 DYSURIA: ICD-10-CM

## 2023-12-08 DIAGNOSIS — N30.01 ACUTE CYSTITIS WITH HEMATURIA: Primary | ICD-10-CM

## 2023-12-08 LAB
SL AMB  POCT GLUCOSE, UA: ABNORMAL
SL AMB LEUKOCYTE ESTERASE,UA: ABNORMAL
SL AMB POCT BILIRUBIN,UA: ABNORMAL
SL AMB POCT BLOOD,UA: ABNORMAL
SL AMB POCT CLARITY,UA: ABNORMAL
SL AMB POCT COLOR,UA: ABNORMAL
SL AMB POCT KETONES,UA: ABNORMAL
SL AMB POCT NITRITE,UA: ABNORMAL
SL AMB POCT PH,UA: 6
SL AMB POCT SPECIFIC GRAVITY,UA: 1.03
SL AMB POCT URINE PROTEIN: ABNORMAL
SL AMB POCT UROBILINOGEN: 0.2

## 2023-12-08 PROCEDURE — 99213 OFFICE O/P EST LOW 20 MIN: CPT

## 2023-12-08 PROCEDURE — 81002 URINALYSIS NONAUTO W/O SCOPE: CPT

## 2023-12-08 PROCEDURE — 87086 URINE CULTURE/COLONY COUNT: CPT

## 2023-12-08 RX ORDER — PEG-3350, SODIUM SULFATE, SODIUM CHLORIDE, POTASSIUM CHLORIDE, SODIUM ASCORBATE AND ASCORBIC ACID 7.5-2.691G
KIT ORAL
COMMUNITY

## 2023-12-08 RX ORDER — NITROFURANTOIN 25; 75 MG/1; MG/1
100 CAPSULE ORAL 2 TIMES DAILY
Qty: 10 CAPSULE | Refills: 0 | Status: SHIPPED | OUTPATIENT
Start: 2023-12-08 | End: 2023-12-13

## 2023-12-08 NOTE — PROGRESS NOTES
[facility]        NAME: Dee Mendez is a 52 y.o. female  : 1970    MRN: [id number]  DATE: 2023  TIME: 3:10 PM    Assessment and Plan   Acute cystitis with hematuria [N30.01]  1. Acute cystitis with hematuria  nitrofurantoin (MACROBID) 100 mg capsule      2. Dysuria  Urine culture    POCT urine dip        Patient Instructions     Urine Dip completed today showing positive for UTI. Will be sent for culture. Antibiotics given today. Follow-up with PCP in the next 1-2 days for re-evaluation if symptoms continue or worsen  Go to the ED if any fevers, malaise, flank pain, new or worsening symptoms or other concerning symptoms. Chief Complaint     Chief Complaint   Patient presents with    Possible UTI     Pt reports urgency and burning x1 day. History of Present Illness     Dee Mendez is a 52 y.o. female presenting to the office today for dysuria. Symptoms have been present for 2 days, and include burning, frequency. She has tried Azo for her symptoms, some relief. Review of Systems     Review of Systems   Constitutional:  Positive for chills. Negative for fever. HENT: Negative. Respiratory: Negative. Negative for cough, shortness of breath and wheezing. Cardiovascular: Negative. Negative for chest pain and palpitations. Gastrointestinal:  Negative for abdominal pain, diarrhea, nausea and vomiting. Genitourinary:  Positive for dysuria, frequency and urgency. Negative for flank pain and hematuria. Musculoskeletal:  Negative for myalgias. Skin:  Negative for rash. Neurological:  Negative for seizures and syncope.        Current Medications       Current Outpatient Medications:     nitrofurantoin (MACROBID) 100 mg capsule, Take 1 capsule (100 mg total) by mouth 2 (two) times a day for 5 days, Disp: 10 capsule, Rfl: 0    baclofen 10 mg tablet, Take 2 tablets (20 mg total) by mouth daily, Disp: 180 tablet, Rfl: 3    bisacodyl (DULCOLAX) 5 mg EC tablet, Take 2 tablets (10 mg total) by mouth daily as needed for constipation for up to 1 dose, Disp: 30 tablet, Rfl: 0    ergocalciferol (VITAMIN D2) 50,000 units, Take 1 capsule (50,000 Units total) by mouth once a week, Disp: 12 capsule, Rfl: 3    levothyroxine 88 mcg tablet, Take 1 tablet (88 mcg total) by mouth daily, Disp: 90 tablet, Rfl: 3    meclizine (ANTIVERT) 25 mg tablet, Take 1 tablet (25 mg total) by mouth every 8 (eight) hours as needed for dizziness, Disp: 100 tablet, Rfl: 0    Multiple Vitamins-Minerals (DAILY MULTI PO), Take by mouth, Disp: , Rfl:     omeprazole (PriLOSEC) 20 mg delayed release capsule, Take 1 capsule (20 mg total) by mouth daily, Disp: 90 capsule, Rfl: 3    PEG 3350-KCl-NaCl-NaSulf-Na Asc-C (MOVIPREP) 100 g, TAKE 4,000 ML BY MOUTH ONCE FOR 1 DOSE., Disp: , Rfl:     sertraline (ZOLOFT) 50 mg tablet, TAKE 1 TABLET DAILY, Disp: 90 tablet, Rfl: 3    Current Allergies     Allergies as of 12/08/2023    (No Known Allergies)            The following portions of the patient's history were reviewed and updated as appropriate: allergies, current medications, past family history, past medical history, past social history, past surgical history and problem list.     Past Medical History:   Diagnosis Date    Disease of thyroid gland     Graves disease     Headache(784.0)     Herpes simplex infection     Migraine without aura and responsive to treatment     Postablative hypothyroidism     Varicella without complication        Past Surgical History:   Procedure Laterality Date    HYSTERECTOMY      age 34    MYOMECTOMY      NEUROPLASTY / TRANSPOSITION MEDIAN NERVE AT CARPAL TUNNEL Right     TOOTH EXTRACTION      UPPER GASTROINTESTINAL ENDOSCOPY         Family History   Problem Relation Age of Onset    Depression Mother     Migraines Mother     Osteopenia Mother     Aortic aneurysm Father     Hyperlipidemia Father     Hypertension Father     Heart disease Father         ischemic    Thyroid disease Father     Stroke Father     Transient ischemic attack Father     Anxiety disorder Brother     Depression Brother     Lung cancer Maternal Uncle         66's    Skin cancer Maternal Uncle         66's    No Known Problems Paternal Aunt     Thyroid disease Paternal Uncle     Diabetes Maternal Grandmother     Stroke Maternal Grandmother     Skin cancer Maternal Grandfather         80's or 90's    Stroke Maternal Grandfather     Osteoporosis Paternal Grandmother     No Known Problems Paternal Grandfather     Anxiety disorder Son     ADD / ADHD Son     Thyroid disease Cousin        Medications have been verified. Objective     /70   Pulse 71   Temp 97.9 °F (36.6 °C)   Resp 16   LMP  (LMP Unknown)   SpO2 98%   No LMP recorded (lmp unknown). Patient has had a hysterectomy. Physical Exam     Physical Exam  Constitutional:       General: She is not in acute distress. Appearance: Normal appearance. She is normal weight. She is not ill-appearing, toxic-appearing or diaphoretic. HENT:      Head: Normocephalic and atraumatic. Right Ear: External ear normal.      Left Ear: External ear normal.      Nose: Nose normal.      Mouth/Throat:      Mouth: Mucous membranes are moist.   Eyes:      Conjunctiva/sclera: Conjunctivae normal.   Cardiovascular:      Rate and Rhythm: Normal rate and regular rhythm. Pulses: Normal pulses. Heart sounds: Normal heart sounds. No murmur heard. No friction rub. No gallop. Pulmonary:      Effort: Pulmonary effort is normal. No respiratory distress. Breath sounds: Normal breath sounds. No stridor. No wheezing, rhonchi or rales. Chest:      Chest wall: No tenderness. Abdominal:      General: Abdomen is flat. There is no distension. Palpations: Abdomen is soft. Tenderness: There is no abdominal tenderness. There is no right CVA tenderness, left CVA tenderness or guarding. Musculoskeletal:         General: Normal range of motion. Cervical back: Normal range of motion and neck supple. Skin:     General: Skin is warm and dry. Capillary Refill: Capillary refill takes less than 2 seconds. Coloration: Skin is not jaundiced. Findings: No bruising or rash. Neurological:      General: No focal deficit present. Mental Status: She is alert. Mental status is at baseline.    Psychiatric:         Mood and Affect: Mood normal.         Behavior: Behavior normal.                   Answers submitted by the patient for this visit:  Painful Urination Questionnaire (Submitted on 12/8/2023)  Chief Complaint: Dysuria  Chronicity: recurrent  Onset: yesterday  Frequency: every urination  Progression since onset: rapidly worsening  Pain quality: burning  Pain - numeric: 8/10  Fever: no fever  Sexually active?: No  History of pyelonephritis?: No  discharge: No  hesitancy: No  possible pregnancy: No  sweats: No

## 2023-12-08 NOTE — TELEPHONE ENCOUNTER
Hi this is [employer]. Last names Krissy Del Castillo, Date of birth Women [birth year]. I'm looking for some guidance. I think I have a UTI and I've been working with Doctor Lanette Chandler. This has been a chronic condition and I know she doesn't have any appointments today, but I didn't. I was looking for some guidance on how to proceed, if she'd like me just to go to the lab to have a test done, or if it's if I can go to a, you know, one of the [address] in clinics. But I'm going to need some relief guessing antibiotic or some sort to take care of this. So I just didn't know how she wanted me to proceed with getting that. So if somebody can call me back at [phone], I think if I don't hear back from you by the end of the day, they will probably go to a walk in clinic to get seen. But also wanted to let her give her the option to if she just wanted to order some work, I can go to one of the labs as well. All right. Thanks so much. Bye. No appts left for today and PCP is not in the office. Advised pt to visit urgent care for the fastest treatment. Had it been earlier this week, I would have felt comfortable doing a urine in the office and reporting off to her PCP but due to the provider not being available, I did not want to delay her treatment on a Friday.

## 2023-12-08 NOTE — PATIENT INSTRUCTIONS
Urine Dip completed today showing positive for UTI. Will be sent for culture. Antibiotics given today. Follow-up with PCP in the next 1-2 days for re-evaluation if symptoms continue or worsen  Go to the ED if any fevers, malaise, flank pain, new or worsening symptoms or other concerning symptoms. Urinary Tract Infection in Women   AMBULATORY CARE:   A urinary tract infection (UTI)  is caused by bacteria that get inside your urinary tract. Your urinary tract includes your kidneys, ureters, bladder, and urethra. A UTI is more common in your lower urinary tract, which includes your bladder and urethra. Common symptoms include the following:   Urinating more often or waking from sleep to urinate    Pain or burning when you urinate    Pain or pressure in your lower abdomen and back    Urine that smells bad    Blood in your urine    Leaking urine    Seek care immediately if:   You are urinating very little or not at all. You have a high fever with shaking chills. You have side or back pain that gets worse. Call your doctor if:   You have a fever. You do not feel better after 2 days of taking antibiotics. You have new symptoms, such as blood or pus in your urine. You are vomiting. You have questions or concerns about your condition or care. Treatment for a UTI  may include antibiotics to treat a bacterial infection. You may also need medicines to decrease pain and burning, or decrease the urge to urinate often. If you have UTIs often (called recurrent UTIs), you may be given antibiotics to take regularly. You will be given directions for when and how to use antibiotics. The goal is to prevent UTIs but not cause antibiotic resistance by using antibiotics too often. Prevent a UTI:   Empty your bladder often. Urinate and empty your bladder as soon as you feel the need. Do not hold your urine for long periods of time. Wipe from front to back after you urinate or have a bowel movement. This will help prevent germs from getting into your urinary tract through your urethra. Drink liquids as directed. Ask how much liquid to drink each day and which liquids are best for you. You may need to drink more liquids than usual to help flush out the bacteria. Do not drink alcohol, caffeine, or citrus juices. These can irritate your bladder and increase your symptoms. Your healthcare provider may recommend cranberry juice to help prevent a UTI. Urinate before and after you have sex. This can help flush out bacteria passed during sex. Do not douche or use feminine deodorants. These can change the chemical balance in your vagina. Change sanitary pads or tampons often. This will help prevent germs from getting into your urinary tract. Talk to your healthcare provider about your birth control method. You may need to change your method if it is increasing your risk for UTIs. Wear cotton underwear and clothes that are loose. Tight pants and nylon underwear can trap moisture and cause bacteria to grow. Vaginal estrogen may be recommended. This medicine helps prevent UTIs in women who have gone through menopause or are in moy-menopause. Do pelvic muscle exercises often. Pelvic muscle exercises may help you start and stop urinating. Strong pelvic muscles may help you empty your bladder easier. Squeeze these muscles tightly for 5 seconds like you are trying to hold back urine. Then relax for 5 seconds. Gradually work up to squeezing for 10 seconds. Do 3 sets of 15 repetitions a day, or as directed. Follow up with your doctor as directed:  Write down your questions so you remember to ask them during your visits. © Copyright [employer] 2023 Information is for End User's use only and may not be sold, redistributed or otherwise used for commercial purposes. The above information is an  only. It is not intended as medical advice for individual conditions or treatments.  Talk to your doctor, nurse or pharmacist before following any medical regimen to see if it is safe and effective for you.

## 2023-12-10 LAB — BACTERIA UR CULT: NORMAL

## 2023-12-27 NOTE — TELEPHONE ENCOUNTER
----- Message from Mickey Bell sent at 3/10/2022  7:33 PM EST -----  Regarding: Care Gap Exclusion  03/10/22 7:33 PM    Jimenez, our patient Randi Adame has had total abdominal hysterectomy completed/performed  Please assist in obtaining the exclusion documentation by making an External outreach to [facility] located in [place], [place]  The date of service is 2007? Surgery was done when pt was about 34 y o       Thank you,  Karlene Mishra MA  [facility] [phone]

## 2024-01-17 ENCOUNTER — APPOINTMENT (OUTPATIENT)
Dept: RADIOLOGY | Facility: CLINIC | Age: 54
End: 2024-01-17
Payer: COMMERCIAL

## 2024-01-17 DIAGNOSIS — M54.59 OTHER LOW BACK PAIN: ICD-10-CM

## 2024-01-17 PROCEDURE — 72100 X-RAY EXAM L-S SPINE 2/3 VWS: CPT

## 2024-01-26 ENCOUNTER — APPOINTMENT (OUTPATIENT)
Dept: RADIOLOGY | Facility: CLINIC | Age: 54
End: 2024-01-26
Payer: COMMERCIAL

## 2024-01-26 DIAGNOSIS — M53.3 SACROCOCCYGEAL DISORDERS, NOT ELSEWHERE CLASSIFIED: ICD-10-CM

## 2024-01-26 PROCEDURE — 72202 X-RAY EXAM SI JOINTS 3/> VWS: CPT

## 2024-01-30 ENCOUNTER — TELEPHONE (OUTPATIENT)
Dept: NEUROLOGY | Facility: CLINIC | Age: 54
End: 2024-01-30

## 2024-01-30 NOTE — TELEPHONE ENCOUNTER
Left message for patient to call back and reschedule appt from 2/2/24 with Nazia due to her being out.

## 2024-02-09 ENCOUNTER — TELEPHONE (OUTPATIENT)
Dept: NEUROLOGY | Facility: CLINIC | Age: 54
End: 2024-02-09

## 2024-02-09 NOTE — TELEPHONE ENCOUNTER
I called and left a voicemail message reminding the patient of there upcoming appointment with Nazia Burns PA-C on 2/14/24 @ 7:30 am in the [place] Office. I requested a call back to our office to confirm the appointment or if the patient has any issues or concerns or cannot keep this appointment.

## 2024-02-14 ENCOUNTER — OFFICE VISIT (OUTPATIENT)
Dept: NEUROLOGY | Facility: CLINIC | Age: 54
End: 2024-02-14
Payer: COMMERCIAL

## 2024-02-14 VITALS
HEART RATE: 71 BPM | OXYGEN SATURATION: 98 % | SYSTOLIC BLOOD PRESSURE: 138 MMHG | RESPIRATION RATE: 16 BRPM | WEIGHT: 180.4 LBS | DIASTOLIC BLOOD PRESSURE: 77 MMHG | TEMPERATURE: 97.4 F | BODY MASS INDEX: 30.97 KG/M2

## 2024-02-14 DIAGNOSIS — H93.12 TINNITUS OF LEFT EAR: ICD-10-CM

## 2024-02-14 DIAGNOSIS — R25.3 MUSCLE TWITCHING: Primary | ICD-10-CM

## 2024-02-14 DIAGNOSIS — G44.209 TENSION HEADACHE: ICD-10-CM

## 2024-02-14 PROCEDURE — 99214 OFFICE O/P EST MOD 30 MIN: CPT | Performed by: PHYSICIAN ASSISTANT

## 2024-02-14 NOTE — PROGRESS NOTES
"Patient ID: Dayanna Sneed is a 53 y.o. female.    Assessment/Plan:    Muscle twitching  Patient with intermittent muscle twitching in all of the extremities, usually worse when she is trying to fall asleep or relaxing her body.  EMG was normal with no evidence to support a generalized neurogenic or myopathic process.     No evidence of atrophy, fasciculations or weakness on exam.     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  Reassurance given that her neurologic workup is unremarkable for a more concerning neuromuscular disorder.     Encouraged the patient to stay hydrated, get proper rest, limit caffeine/stimulants.      Tension headache  Well controlled currently.  She is taking baclofen 20mg daily, and using magnesium and B2 supplements regularly.  She uses Excedrin at onset of HA if needed. Reminded patient to limit use of OTC NSAIDs/tylenol to no more than 2-3 times a week to prevent rebound HA.  Encouraged maintaining proper hydration, eating regularly/not skipping meals and getting adequate sleep.  We discussed keeping track/logging headaches to see if any specific triggers can be identified.     Tinnitus of left ear  At timing of last visit reported an occasional \"whooshing\" sound in her left ear intermittently.  Carotid ultrasound was completed 8/7/2023 with no evidence of disease right ICA and less than 50% stenosis left ICA.  She says this has become less frequent and less bothersome/prominent.  No new neurologic symptoms.    Patient will return in 6-8 months or sooner if needed.     Diagnoses and all orders for this visit:    Muscle twitching    Tension headache    Tinnitus of left ear           Subjective:    HPI    Patient is a 53 year old right handed female who presents today for neurologic follow up.  She was last seen in July 2023.     To review, she was initially evaluated by Dr. Early on 6/15/2020 for several issues.  Patient described that for the past 8-9 months " she had been experiencing muscle twitching. She described twitching proximally in any of the 4 extremities although occasionally will occur in the hands when she is working on the computer. She stated this was particularly apparent when resting.  She had no associated weakness or sensory symptoms. No symptoms to suggest a Lhermitte's phenomenon. No change in bowel or bladder function, speech or swallowing. In reviewing past studies, she was found to be vitamin-D deficient. She was put on a supplement.   Her father has Parkinson's disease. However, she is unaware of any family member with neuro muscular disease.  She also described headaches. These date back a long time were getting worse over the past 8-9 months as well. These are described as generally right frontoparietal in location.  She describes these predominantly as tightness, with rare modest pulsatile component. These have been occurring in the recent months on average of every other day and she does take Excedrin 2 tablets generally every other day, suggesting the possibility analgesic overuse effect as well.     Labs completed 8/13/2020 with normal CK, slightly low aldolase, slightly elevated TSH at 4.25 with normal FT4 1.12, normal magnesium, low vit D (29), normal folate, normal ionized calcium, essentially unremarkable CBC and CMP.    EMG of the LUE/LLE completed 10/14/2020 by Dr. Presley was a normal study with no evidence of a generalized neurogenic or myopathic process effecting the peripheral nervous system.       At timing of visit in Feb 2021 she reported some issues with vertigo.  She has a history of BPPV, but more recently had experienced some episodes of vertigo associated with head pressure that seemed to be brought on by stress.  This did not occur with head movement.  Also with some imbalance.  MRI brain with IACs w and wo contrast was completed 4/14/2021.  Normal appearance of the IACs.  Scattered FLAIR and T2 foci are seen most  compatible with chronic microangiopathic changes in patients of this age group.       Today, patient reports she is stable. The twitching is the same, no changes. Headaches are also stable, mild, and relieved with Excedrin. She feels she stays well hydrated, is eating regularly, sleeps well at night. She denies any vision changes, speech or swallowing difficulty, bowel or bladder changes. No recent bouts of vertigo. At timing of last visit she reported occasionally getting a “whooshing” sound in her left ear, occurring on and off for the last 1-2 months. Carotid US was completed 8/7/23 with no evidence of disease in R ICA and <50% stenosis L ICA.  She says this has become less frequent and not as noticeable. She continues to use baclofen 20mg daily.  She is seeing physiatry and PT at [facility] for her low back pain.    The following portions of the patient's history were reviewed and updated as appropriate: current medications, past family history, past medical history, past social history, past surgical history, and problem list.         Objective:    Blood pressure 138/77, pulse 71, temperature (!) 97.4 °F (36.3 °C), temperature source Temporal, resp. rate 16, weight 81.8 kg (180 lb 6.4 oz), SpO2 98%, not currently breastfeeding.    Physical Exam  Constitutional:       Appearance: Normal appearance.   Eyes:      Extraocular Movements: EOM normal.      Pupils: Pupils are equal, round, and reactive to light.   Neurological:      Mental Status: She is alert.      Motor: Motor strength is normal.     Deep Tendon Reflexes: Reflexes are normal and symmetric.   Psychiatric:         Mood and Affect: Mood normal.         Speech: Speech normal.         Behavior: Behavior normal.         Neurological Exam  Mental Status  Alert. Oriented to person, place, time and situation. Speech is normal. Language is fluent with no aphasia. Attention and concentration are normal.    Cranial Nerves  CN II: Visual fields full to  confrontation.  CN III, IV, VI: Extraocular movements intact bilaterally. Pupils equal round and reactive to light bilaterally.  CN V: Facial sensation is normal.  CN VII: Full and symmetric facial movement.  CN VIII: Hearing is normal.  CN IX, X: Palate elevates symmetrically  CN XI: Shoulder shrug strength is normal.  CN XII: Tongue midline without atrophy or fasciculations.    Motor   Normal muscle tone. Strength is 5/5 throughout all four extremities.    Sensory  Light touch is normal in upper and lower extremities.     Reflexes  Deep tendon reflexes are 2+ and symmetric in all four extremities.    Coordination  Left: Finger-to-nose normal.    Gait  Casual gait is normal including stance, stride, and arm swing.        ROS:    Review of Systems   Constitutional:  Negative for appetite change, fatigue and fever.   HENT: Negative.  Negative for hearing loss, tinnitus, trouble swallowing and voice change.    Eyes: Negative.  Negative for photophobia, pain and visual disturbance.   Respiratory: Negative.  Negative for shortness of breath.    Cardiovascular: Negative.  Negative for palpitations.   Gastrointestinal: Negative.  Negative for nausea and vomiting.   Endocrine: Negative.  Negative for cold intolerance.   Genitourinary: Negative.  Negative for dysuria, frequency and urgency.   Musculoskeletal:  Negative for back pain, gait problem, myalgias, neck pain and neck stiffness.   Skin: Negative.  Negative for rash.   Allergic/Immunologic: Negative.    Neurological: Negative.  Negative for dizziness, tremors, seizures, syncope, facial asymmetry, speech difficulty, weakness, light-headedness, numbness and headaches.   Hematological: Negative.  Does not bruise/bleed easily.   Psychiatric/Behavioral: Negative.  Negative for confusion, hallucinations and sleep disturbance.      I personally reviewed and updated the ROS as appropriate

## 2024-02-14 NOTE — PATIENT INSTRUCTIONS
Continue baclofen and magnesium.  Make sure you are staying well hydrated, exercising and stretching, eating regularly, getting adequate sleep  Follow up in 6-8 months

## 2024-02-14 NOTE — ASSESSMENT & PLAN NOTE
Well controlled currently.  She is taking baclofen 20mg daily, and using magnesium and B2 supplements regularly.  She uses Excedrin at onset of HA if needed. Reminded patient to limit use of OTC NSAIDs/tylenol to no more than 2-3 times a week to prevent rebound HA.  Encouraged maintaining proper hydration, eating regularly/not skipping meals and getting adequate sleep.  We discussed keeping track/logging headaches to see if any specific triggers can be identified.

## 2024-02-14 NOTE — ASSESSMENT & PLAN NOTE
"At timing of last visit reported an occasional \"whooshing\" sound in her left ear intermittently.  Carotid ultrasound was completed 8/7/2023 with no evidence of disease right ICA and less than 50% stenosis left ICA.  She says this has become less frequent and less bothersome/prominent.  No new neurologic symptoms.  "

## 2024-03-29 DIAGNOSIS — F41.9 ANXIETY: ICD-10-CM

## 2024-04-08 ENCOUNTER — HOSPITAL ENCOUNTER (OUTPATIENT)
Dept: MRI IMAGING | Facility: HOSPITAL | Age: 54
Discharge: HOME | End: 2024-04-08
Payer: COMMERCIAL

## 2024-04-08 DIAGNOSIS — M54.17 RADICULOPATHY, LUMBOSACRAL REGION: ICD-10-CM

## 2024-04-08 PROCEDURE — 72148 MRI LUMBAR SPINE W/O DYE: CPT

## 2024-06-23 ENCOUNTER — OFFICE VISIT (OUTPATIENT)
Dept: URGENT CARE | Facility: MEDICAL CENTER | Age: 54
End: 2024-06-23
Payer: COMMERCIAL

## 2024-06-23 VITALS
OXYGEN SATURATION: 96 % | DIASTOLIC BLOOD PRESSURE: 67 MMHG | HEART RATE: 61 BPM | SYSTOLIC BLOOD PRESSURE: 140 MMHG | TEMPERATURE: 98 F | RESPIRATION RATE: 18 BRPM

## 2024-06-23 DIAGNOSIS — R35.0 FREQUENCY OF URINATION: ICD-10-CM

## 2024-06-23 DIAGNOSIS — N39.0 URINARY TRACT INFECTION WITHOUT HEMATURIA, SITE UNSPECIFIED: Primary | ICD-10-CM

## 2024-06-23 LAB
SL AMB  POCT GLUCOSE, UA: ABNORMAL
SL AMB LEUKOCYTE ESTERASE,UA: ABNORMAL
SL AMB POCT BILIRUBIN,UA: ABNORMAL
SL AMB POCT BLOOD,UA: ABNORMAL
SL AMB POCT CLARITY,UA: ABNORMAL
SL AMB POCT COLOR,UA: ABNORMAL
SL AMB POCT KETONES,UA: ABNORMAL
SL AMB POCT NITRITE,UA: ABNORMAL
SL AMB POCT PH,UA: 6
SL AMB POCT SPECIFIC GRAVITY,UA: 1.02
SL AMB POCT URINE PROTEIN: ABNORMAL
SL AMB POCT UROBILINOGEN: 0.2

## 2024-06-23 PROCEDURE — 87077 CULTURE AEROBIC IDENTIFY: CPT | Performed by: NURSE PRACTITIONER

## 2024-06-23 PROCEDURE — S9083 URGENT CARE CENTER GLOBAL: HCPCS | Performed by: NURSE PRACTITIONER

## 2024-06-23 PROCEDURE — G0381 LEV 2 HOSP TYPE B ED VISIT: HCPCS | Performed by: NURSE PRACTITIONER

## 2024-06-23 PROCEDURE — 87186 SC STD MICRODIL/AGAR DIL: CPT | Performed by: NURSE PRACTITIONER

## 2024-06-23 PROCEDURE — 87086 URINE CULTURE/COLONY COUNT: CPT | Performed by: NURSE PRACTITIONER

## 2024-06-23 PROCEDURE — 81002 URINALYSIS NONAUTO W/O SCOPE: CPT | Performed by: NURSE PRACTITIONER

## 2024-06-23 RX ORDER — CIPROFLOXACIN 250 MG/1
250 TABLET, FILM COATED ORAL EVERY 12 HOURS SCHEDULED
Qty: 6 TABLET | Refills: 0 | Status: SHIPPED | OUTPATIENT
Start: 2024-06-23 | End: 2024-06-26

## 2024-06-23 NOTE — PATIENT INSTRUCTIONS
Start Cipro 250mg BID for 3 days  Continue Pyridium as needed for burning with urination  Increase fluids  Follow up with PCP in 3-5 days.  Proceed to  ER if symptoms worsen.

## 2024-06-23 NOTE — PROGRESS NOTES
[facility]        NAME: Dayanna Sneed is a 53 y.o. female  : 1970    MRN: [id number]  DATE: 2024  TIME: 11:04 AM    Assessment and Plan   Urinary tract infection without hematuria, site unspecified [N39.0]  1. Urinary tract infection without hematuria, site unspecified        2. Frequency of urination  POCT urine dip    Urine culture    ciprofloxacin (CIPRO) 250 mg tablet            Patient Instructions   Start Cipro 250mg BID for 3 days  Continue Pyridium as needed for burning with urination  Increase fluids  Follow up with PCP in 3-5 days.  Proceed to  ER if symptoms worsen.      If tests are performed, our office will contact you with results only if changes need to made to the care plan discussed with you at the visit. [facility]    Chief Complaint     Chief Complaint   Patient presents with    Possible UTI     Patient c/o urinary frequency and dysuria x 2 day           History of Present Illness       HPI      Dayanna Sneed is a 53 y.o. female who complains of urinary frequency, urgency and dysuria x 3 days, without flank pain, fever, chills, or abnormal vaginal discharge or bleeding. Has had UTIs in the past, none recently.    Denies CP and SOB    Medication allergies confirmed: none    Review of Systems   Review of Systems   Respiratory:  Negative for cough, chest tightness, shortness of breath and wheezing.    Cardiovascular:  Negative for chest pain and palpitations.   Genitourinary:  Positive for difficulty urinating, dysuria, frequency and urgency. Negative for decreased urine volume, flank pain, hematuria, menstrual problem, pelvic pain, vaginal bleeding, vaginal discharge and vaginal pain.   All other systems reviewed and are negative.        Current Medications       Current Outpatient Medications:     ciprofloxacin (CIPRO) 250 mg tablet, Take 1 tablet (250 mg total) by mouth every 12 (twelve) hours for 3 days, Disp:  6 tablet, Rfl: 0    Aspirin 81 MG CAPS, Take 1 capsule (81mg) by mouth Once Daily., Disp: , Rfl:     baclofen 10 mg tablet, Take 2 tablets (20 mg total) by mouth daily, Disp: 180 tablet, Rfl: 3    bisacodyl (DULCOLAX) 5 mg EC tablet, Take 2 tablets (10 mg total) by mouth daily as needed for constipation for up to 1 dose, Disp: 30 tablet, Rfl: 0    ergocalciferol (VITAMIN D2) 50,000 units, Take 1 capsule (50,000 Units total) by mouth once a week, Disp: 12 capsule, Rfl: 3    levothyroxine 88 mcg tablet, Take 1 tablet (88 mcg total) by mouth daily, Disp: 90 tablet, Rfl: 3    meclizine (ANTIVERT) 25 mg tablet, Take 1 tablet (25 mg total) by mouth every 8 (eight) hours as needed for dizziness, Disp: 100 tablet, Rfl: 0    Multiple Vitamins-Minerals (DAILY MULTI PO), Take by mouth, Disp: , Rfl:     omeprazole (PriLOSEC) 20 mg delayed release capsule, Take 1 capsule (20 mg total) by mouth daily, Disp: 90 capsule, Rfl: 3    PEG 3350-KCl-NaCl-NaSulf-Na Asc-C (MOVIPREP) 100 g, TAKE 4,000 ML BY MOUTH ONCE FOR 1 DOSE. (Patient not taking: Reported on 2/14/2024), Disp: , Rfl:     sertraline (ZOLOFT) 50 mg tablet, TAKE 1 TABLET DAILY, Disp: 90 tablet, Rfl: 1    Current Allergies     Allergies as of 06/23/2024    (No Known Allergies)            The following portions of the patient's history were reviewed and updated as appropriate: allergies, current medications, past family history, past medical history, past social history, past surgical history and problem list.     Past Medical History:   Diagnosis Date    Disease of thyroid gland     Graves disease     Headache(784.0)     Herpes simplex infection     Migraine without aura and responsive to treatment     Postablative hypothyroidism     Varicella without complication        Past Surgical History:   Procedure Laterality Date    HYSTERECTOMY      age 38    MYOMECTOMY      NEUROPLASTY / TRANSPOSITION MEDIAN NERVE AT CARPAL TUNNEL Right     TOOTH EXTRACTION      UPPER  GASTROINTESTINAL ENDOSCOPY         Family History   Problem Relation Age of Onset    Depression Mother     Migraines Mother     Osteopenia Mother     Aortic aneurysm Father     Hyperlipidemia Father     Hypertension Father     Heart disease Father         ischemic    Thyroid disease Father     Stroke Father     Transient ischemic attack Father     Anxiety disorder Brother     Depression Brother     Lung cancer Maternal Uncle         70's    Skin cancer Maternal Uncle         70's    No Known Problems Paternal Aunt     Thyroid disease Paternal Uncle     Diabetes Maternal Grandmother     Stroke Maternal Grandmother     Skin cancer Maternal Grandfather         80's or 90's    Stroke Maternal Grandfather     Osteoporosis Paternal Grandmother     No Known Problems Paternal Grandfather     Anxiety disorder Son     ADD / ADHD Son     Thyroid disease Cousin          Medications have been verified.        Objective   /67   Pulse 61   Temp 98 °F (36.7 °C)   Resp 18   LMP  (LMP Unknown)   SpO2 96%        Physical Exam     Physical Exam  Vitals and nursing note reviewed.   Constitutional:       General: She is not in acute distress.     Appearance: Normal appearance. She is not ill-appearing.   Cardiovascular:      Rate and Rhythm: Normal rate and regular rhythm.      Pulses: Normal pulses.      Heart sounds: Normal heart sounds.   Pulmonary:      Effort: Pulmonary effort is normal.      Breath sounds: Normal breath sounds.   Abdominal:      General: There is no distension.      Tenderness: There is no abdominal tenderness. There is no right CVA tenderness, left CVA tenderness or guarding.   Skin:     General: Skin is warm and dry.   Neurological:      Mental Status: She is alert and oriented to person, place, and time.   Psychiatric:         Mood and Affect: Mood normal.         Behavior: Behavior normal.

## 2024-06-25 LAB — BACTERIA UR CULT: ABNORMAL

## 2024-06-26 ENCOUNTER — TELEPHONE (OUTPATIENT)
Age: 54
End: 2024-06-26

## 2024-06-26 DIAGNOSIS — R30.0 DYSURIA: Primary | ICD-10-CM

## 2024-06-26 NOTE — TELEPHONE ENCOUNTER
Patient states she was seen at  for a UTI on 06/23, was prescribed Cipro. Finished last dose yesterday evening but started to feel symptoms return yesterday afternoon, burning and pressure. She was asking if possibly another urine sample would need to be completed or an additional abx could be prescribed at this time. Please advise.

## 2024-06-27 ENCOUNTER — APPOINTMENT (OUTPATIENT)
Dept: LAB | Facility: CLINIC | Age: 54
End: 2024-06-27
Payer: COMMERCIAL

## 2024-06-27 DIAGNOSIS — R30.0 DYSURIA: ICD-10-CM

## 2024-06-27 LAB
BILIRUB UR QL STRIP: NEGATIVE
CLARITY UR: CLEAR
COLOR UR: NORMAL
GLUCOSE UR STRIP-MCNC: NEGATIVE MG/DL
HGB UR QL STRIP.AUTO: NEGATIVE
KETONES UR STRIP-MCNC: NEGATIVE MG/DL
LEUKOCYTE ESTERASE UR QL STRIP: NEGATIVE
NITRITE UR QL STRIP: NEGATIVE
PH UR STRIP.AUTO: 6 [PH]
PROT UR STRIP-MCNC: NEGATIVE MG/DL
SP GR UR STRIP.AUTO: 1.02 (ref 1–1.03)
UROBILINOGEN UR STRIP-ACNC: <2 MG/DL

## 2024-06-27 PROCEDURE — 87086 URINE CULTURE/COLONY COUNT: CPT

## 2024-06-27 PROCEDURE — 81003 URINALYSIS AUTO W/O SCOPE: CPT

## 2024-06-27 NOTE — TELEPHONE ENCOUNTER
Spoke with patient  about  urinalysis which  is normal and  culture is in the process that takes 48 hours.

## 2024-06-27 NOTE — TELEPHONE ENCOUNTER
Patient called asking to review her UA results from earlier today.    Please have provider review results and f/u with patient.

## 2024-06-27 NOTE — TELEPHONE ENCOUNTER
Patient contacted the office back this morning looking for an update. I did relay that pcp did put orders in for a UA and urine culture yesterday to complete. She will go to a lab today. Nothing else needed at this time.

## 2024-06-28 ENCOUNTER — PATIENT MESSAGE (OUTPATIENT)
Dept: FAMILY MEDICINE CLINIC | Facility: CLINIC | Age: 54
End: 2024-06-28

## 2024-06-28 LAB — BACTERIA UR CULT: NORMAL

## 2024-08-07 ENCOUNTER — NURSE TRIAGE (OUTPATIENT)
Age: 54
End: 2024-08-07

## 2024-08-07 DIAGNOSIS — R39.9 URINARY TRACT INFECTION SYMPTOMS: Primary | ICD-10-CM

## 2024-08-07 NOTE — TELEPHONE ENCOUNTER
"Patient reports UTI symptoms and would like a call back with PCP recommendations.     Reason for Disposition   All other urine symptoms    Answer Assessment - Initial Assessment Questions  1. SYMPTOM: \"What's the main symptom you're concerned about?\" (e.g., frequency, incontinence)      Urgency, bladder pressure, burning   2. ONSET: \"When did they start?\"      8/5/24  3. PAIN: \"Is there any pain?\" If Yes, ask: \"How bad is it?\" (Scale: 1-10; mild, moderate, severe)      Moderate  4. CAUSE: \"What do you think is causing the symptoms?\"      UTI  5. OTHER SYMPTOMS: \"Do you have any other symptoms?\" (e.g., fever, flank pain, blood in urine, pain with urination)      Denies   6. PREGNANCY: \"Is there any chance you are pregnant?\" \"When was your last menstrual period?\"      N/A    Protocols used: Urinary Symptoms-ADULT-[place]    "

## 2024-08-08 ENCOUNTER — APPOINTMENT (OUTPATIENT)
Dept: LAB | Facility: CLINIC | Age: 54
End: 2024-08-08
Payer: COMMERCIAL

## 2024-08-08 DIAGNOSIS — R39.9 URINARY TRACT INFECTION SYMPTOMS: ICD-10-CM

## 2024-08-08 LAB
BACTERIA UR QL AUTO: ABNORMAL /HPF
BILIRUB UR QL STRIP: ABNORMAL
CLARITY UR: CLEAR
COLOR UR: ABNORMAL
GLUCOSE UR STRIP-MCNC: NEGATIVE MG/DL
HGB UR QL STRIP.AUTO: NEGATIVE
KETONES UR STRIP-MCNC: NEGATIVE MG/DL
LEUKOCYTE ESTERASE UR QL STRIP: NEGATIVE
MUCOUS THREADS UR QL AUTO: ABNORMAL
NITRITE UR QL STRIP: POSITIVE
NON-SQ EPI CELLS URNS QL MICRO: ABNORMAL /HPF
PH UR STRIP.AUTO: 5.5 [PH]
PROT UR STRIP-MCNC: ABNORMAL MG/DL
RBC #/AREA URNS AUTO: ABNORMAL /HPF
SP GR UR STRIP.AUTO: 1.02 (ref 1–1.03)
TRANS CELLS #/AREA URNS HPF: PRESENT /[HPF]
UROBILINOGEN UR STRIP-ACNC: 2 MG/DL
WBC #/AREA URNS AUTO: ABNORMAL /HPF

## 2024-08-08 PROCEDURE — 81001 URINALYSIS AUTO W/SCOPE: CPT

## 2024-08-08 PROCEDURE — 87086 URINE CULTURE/COLONY COUNT: CPT

## 2024-08-09 LAB — BACTERIA UR CULT: NORMAL

## 2024-08-11 ENCOUNTER — OFFICE VISIT (OUTPATIENT)
Dept: URGENT CARE | Facility: MEDICAL CENTER | Age: 54
End: 2024-08-11
Payer: COMMERCIAL

## 2024-08-11 VITALS
RESPIRATION RATE: 18 BRPM | OXYGEN SATURATION: 99 % | TEMPERATURE: 101.3 F | SYSTOLIC BLOOD PRESSURE: 118 MMHG | HEART RATE: 110 BPM | DIASTOLIC BLOOD PRESSURE: 60 MMHG

## 2024-08-11 DIAGNOSIS — R50.9 FEVER, UNSPECIFIED FEVER CAUSE: ICD-10-CM

## 2024-08-11 DIAGNOSIS — B34.9 VIRAL ILLNESS: Primary | ICD-10-CM

## 2024-08-11 LAB — S PYO AG THROAT QL: NEGATIVE

## 2024-08-11 PROCEDURE — G0381 LEV 2 HOSP TYPE B ED VISIT: HCPCS | Performed by: PHYSICIAN ASSISTANT

## 2024-08-11 PROCEDURE — S9083 URGENT CARE CENTER GLOBAL: HCPCS | Performed by: PHYSICIAN ASSISTANT

## 2024-08-11 PROCEDURE — 87070 CULTURE OTHR SPECIMN AEROBIC: CPT | Performed by: PHYSICIAN ASSISTANT

## 2024-08-11 PROCEDURE — 87880 STREP A ASSAY W/OPTIC: CPT | Performed by: PHYSICIAN ASSISTANT

## 2024-08-11 RX ORDER — CIPROFLOXACIN 250 MG/1
TABLET, FILM COATED ORAL
COMMUNITY

## 2024-08-11 NOTE — PROGRESS NOTES
[facility]        NAME: Dayanna Sneed is a 53 y.o. female  : 1970    MRN: [id number]  DATE: 2024  TIME: 2:59 PM    Assessment and Plan   Viral illness [B34.9]  1. Viral illness        2. Fever, unspecified fever cause  POCT rapid ANTIGEN strepA    Throat culture    Throat culture            Patient Instructions     Your symptoms are consistent with a viral illness.    For nasal/sinus congestion you can try steam, warm compresses, saline nasal spray, Neti pot, nasal steroid (Flonase, Nasocort) to be used at bedtime after the saline nasal spray, or nasal decongestant (Afrin - for 3 days only).    You can try a decongestant (Sudafed) if > 6 years of age and no history of high blood pressure.    For cough you can take an over-the-counter expectorant such as plain Robitussion or Mucinex. A spoonful of honey at bedtime may also be helpful.    For cold symptoms with high blood pressure take Coricidin cough/cold.     For sore throat you can use Cepacol lozenges, do warm salt water gargles, drink warm water with lemon or herbal teas, or use an over-the-counter throat spray (Chloraseptic).    You can take ibuprofen/Motrin and acetaminophen/Tylenol as needed for pain, fever, body aches. Do not take ibuprofen/Motrin/Advil if you have a history of heart disease, bleeding ulcers, or if you take blood thinners.     Drink plenty of fluids to stay hydrated.    Follow up with your PCP in 5-7 days for persistent symptoms.    Go to the ER if symptoms worsen.    Follow up with PCP in 3-5 days.  Proceed to  ER if symptoms worsen.    If tests have been performed at [facility], our office will contact you with results if changes need to be made to the care plan discussed with you at the visit.  You can [facility] your full [facility] on [facility].    Chief Complaint     Chief Complaint   Patient presents with    Fever     Patient c/o fever and sore throat x 3 days          History of Present Illness        Fever - 9 weeks to 74 years   The current episode started in the past 7 days. The problem occurs constantly. The problem has been waxing and waning. The maximum temperature noted was 103 to 103.9 F. The temperature was taken using a tympanic thermometer. Associated symptoms include congestion, coughing, headaches, muscle aches and a sore throat. Pertinent negatives include no abdominal pain, chest pain, diarrhea, ear pain, nausea, rash, sleepiness, urinary pain, vomiting or wheezing.   Risk factors: no contaminated food, no contaminated water, no hx of cancer, no immunosuppression, no occupational exposure, no recent sickness, no recent travel and no sick contacts    Pt reports fever x 3 days, T max 103.  C/o dry cough, ear congestion, nasal congestion and HA.    Has taken Tylenol with relief of fever.  PMH: GERD, hypothryoid    Review of Systems   Review of Systems   Constitutional:  Positive for fever.   HENT:  Positive for congestion and sore throat. Negative for ear pain.    Respiratory:  Positive for cough. Negative for wheezing.    Cardiovascular:  Negative for chest pain.   Gastrointestinal:  Negative for abdominal pain, diarrhea, nausea and vomiting.   Genitourinary:  Negative for dysuria.   Skin:  Negative for rash.   Neurological:  Positive for headaches.         Current Medications       Current Outpatient Medications:     Aspirin 81 MG CAPS, Take 1 capsule (81mg) by mouth Once Daily., Disp: , Rfl:     baclofen 10 mg tablet, Take 2 tablets (20 mg total) by mouth daily, Disp: 180 tablet, Rfl: 3    bisacodyl (DULCOLAX) 5 mg EC tablet, Take 2 tablets (10 mg total) by mouth daily as needed for constipation for up to 1 dose, Disp: 30 tablet, Rfl: 0    ciprofloxacin (CIPRO) 250 mg tablet, TAKE 1 TABLET BY MOUTH EVERY 12 HOURS FOR 3 DAYS (Patient not taking: Reported on 8/11/2024), Disp: , Rfl:     ergocalciferol (VITAMIN D2) 50,000 units, Take 1 capsule (50,000 Units total) by mouth once a week, Disp: 12  capsule, Rfl: 3    levothyroxine 88 mcg tablet, Take 1 tablet (88 mcg total) by mouth daily, Disp: 90 tablet, Rfl: 3    meclizine (ANTIVERT) 25 mg tablet, Take 1 tablet (25 mg total) by mouth every 8 (eight) hours as needed for dizziness, Disp: 100 tablet, Rfl: 0    Multiple Vitamins-Minerals (DAILY MULTI PO), Take by mouth, Disp: , Rfl:     omeprazole (PriLOSEC) 20 mg delayed release capsule, Take 1 capsule (20 mg total) by mouth daily, Disp: 90 capsule, Rfl: 3    PEG 3350-KCl-NaCl-NaSulf-Na Asc-C (MOVIPREP) 100 g, TAKE 4,000 ML BY MOUTH ONCE FOR 1 DOSE. (Patient not taking: Reported on 2/14/2024), Disp: , Rfl:     sertraline (ZOLOFT) 50 mg tablet, TAKE 1 TABLET DAILY, Disp: 90 tablet, Rfl: 1    Current Allergies     Allergies as of 08/11/2024    (No Known Allergies)            The following portions of the patient's history were reviewed and updated as appropriate: allergies, current medications, past family history, past medical history, past social history, past surgical history and problem list.     Past Medical History:   Diagnosis Date    Disease of thyroid gland     Graves disease     Headache(784.0)     Herpes simplex infection     Migraine without aura and responsive to treatment     Postablative hypothyroidism     Varicella without complication        Past Surgical History:   Procedure Laterality Date    HYSTERECTOMY      age 38    MYOMECTOMY      NEUROPLASTY / TRANSPOSITION MEDIAN NERVE AT CARPAL TUNNEL Right     TOOTH EXTRACTION      UPPER GASTROINTESTINAL ENDOSCOPY         Family History   Problem Relation Age of Onset    Depression Mother     Migraines Mother     Osteopenia Mother     Aortic aneurysm Father     Hyperlipidemia Father     Hypertension Father     Heart disease Father         ischemic    Thyroid disease Father     Stroke Father     Transient ischemic attack Father     Anxiety disorder Brother     Depression Brother     Lung cancer Maternal Uncle         70's    Skin cancer Maternal Uncle          70's    No Known Problems Paternal Aunt     Thyroid disease Paternal Uncle     Diabetes Maternal Grandmother     Stroke Maternal Grandmother     Skin cancer Maternal Grandfather         80's or 90's    Stroke Maternal Grandfather     Osteoporosis Paternal Grandmother     No Known Problems Paternal Grandfather     Anxiety disorder Son     ADD / ADHD Son     Thyroid disease Cousin          Medications have been verified.        Objective   /60   Pulse (!) 110   Temp (!) 101.3 °F (38.5 °C)   Resp 18   LMP  (LMP Unknown)   SpO2 99%   No LMP recorded (lmp unknown). Patient has had a hysterectomy.       Physical Exam     Physical Exam  Vitals and nursing note reviewed.   Constitutional:       General: She is not in acute distress.     Appearance: Normal appearance.   HENT:      Head: Normocephalic and atraumatic.      Ears:      Comments: Serous fluid with mild bulging bilat, right ear with mild erythema     Nose: Congestion present.      Mouth/Throat:      Mouth: Mucous membranes are moist.      Pharynx: Posterior oropharyngeal erythema present. No oropharyngeal exudate.   Eyes:      Conjunctiva/sclera: Conjunctivae normal.   Cardiovascular:      Rate and Rhythm: Normal rate and regular rhythm.      Pulses: Normal pulses.      Heart sounds: Normal heart sounds.   Pulmonary:      Effort: Pulmonary effort is normal.      Breath sounds: Normal breath sounds. No wheezing, rhonchi or rales.   Lymphadenopathy:      Cervical: Cervical adenopathy present.   Skin:     General: Skin is warm and dry.   Neurological:      Mental Status: She is alert and oriented to person, place, and time.   Psychiatric:         Mood and Affect: Mood normal.         Behavior: Behavior normal.         Rapid strep: neg    COVID testing offered and declined by patient

## 2024-08-13 LAB — BACTERIA THROAT CULT: NORMAL

## 2024-08-20 ENCOUNTER — TELEPHONE (OUTPATIENT)
Dept: NEUROLOGY | Facility: CLINIC | Age: 54
End: 2024-08-20

## 2024-08-20 NOTE — TELEPHONE ENCOUNTER
"Chief Complaint   Patient presents with     RECHECK       Initial /70 (BP Location: Left arm, Patient Position: Chair, Cuff Size: Adult Regular)   Pulse 76   Temp (!) 95.9  F (35.5  C) (Tympanic)   Ht 1.778 m (5' 10\")   Wt 61.2 kg (135 lb)   SpO2 100%   BMI 19.37 kg/m   Estimated body mass index is 19.37 kg/m  as calculated from the following:    Height as of this encounter: 1.778 m (5' 10\").    Weight as of this encounter: 61.2 kg (135 lb).  Medication Reconciliation: complete  DIMITRY GUDINO LPN    " Called and LMOM for Pt to call back to R/S appt alan Burns PA-C on 8/27 Due to provider will not be in the office that morning.

## 2024-08-22 DIAGNOSIS — R51.9 PERSISTENT HEADACHES: ICD-10-CM

## 2024-08-22 RX ORDER — BACLOFEN 10 MG/1
20 TABLET ORAL DAILY
Qty: 60 TABLET | Refills: 0 | Status: SHIPPED | OUTPATIENT
Start: 2024-08-22

## 2024-08-22 NOTE — TELEPHONE ENCOUNTER
08/22/24    Patient called office returning a voice message left in-regards of rescheduling 08/27/24 appt with Miss. Mandujano.    Patient ok to reschedule appt, but requested an appt in late September if possible.    Appt slot was available and appt rescheduled for 09/27/24, 3:30 PM With Miss. Mandujano at the [place] location.       Any questions, please contact patient.  Thank You.       NOTE:   Patient also requested medication refill, and message already sent.

## 2024-08-22 NOTE — TELEPHONE ENCOUNTER
08/22/24    Medication: baclofen 10 mg tablet     Dose/Frequency: 20 mg, Oral, Daily     Quantity: Dispense: 180 tablet     Pharmacy:   [facility] [id number] - [place] - [address]       Office:   [x] PCP/Provider -   [] Speciality/Provider -     Does the patient have enough for 3 days?   [x] Yes   [] No - Send as HP to POD      NOTE:   Patient appt rescheduled for 09/27/24, 3:30 PM With Miss. Mandujano at the [place] location.     Patient is requesting medication refill in case she runs out before her new appt date. Patient stated that she think she would be ok, but just wants to be safe. (PATIENT STATED)     Any questions, please contact Patient.   Thank You.

## 2024-09-25 ENCOUNTER — PATIENT MESSAGE (OUTPATIENT)
Dept: FAMILY MEDICINE CLINIC | Facility: CLINIC | Age: 54
End: 2024-09-25

## 2024-09-25 DIAGNOSIS — F41.9 ANXIETY: ICD-10-CM

## 2024-09-27 ENCOUNTER — OFFICE VISIT (OUTPATIENT)
Dept: NEUROLOGY | Facility: CLINIC | Age: 54
End: 2024-09-27
Payer: COMMERCIAL

## 2024-09-27 VITALS
HEART RATE: 65 BPM | SYSTOLIC BLOOD PRESSURE: 124 MMHG | OXYGEN SATURATION: 98 % | HEIGHT: 64 IN | WEIGHT: 184.2 LBS | BODY MASS INDEX: 31.45 KG/M2 | TEMPERATURE: 97.2 F | RESPIRATION RATE: 18 BRPM | DIASTOLIC BLOOD PRESSURE: 68 MMHG

## 2024-09-27 DIAGNOSIS — R06.83 SNORING: ICD-10-CM

## 2024-09-27 DIAGNOSIS — R25.3 MUSCLE TWITCHING: ICD-10-CM

## 2024-09-27 DIAGNOSIS — R51.9 PERSISTENT HEADACHES: ICD-10-CM

## 2024-09-27 DIAGNOSIS — G44.209 TENSION HEADACHE: Primary | ICD-10-CM

## 2024-09-27 PROCEDURE — 99214 OFFICE O/P EST MOD 30 MIN: CPT | Performed by: PHYSICIAN ASSISTANT

## 2024-09-27 RX ORDER — BACLOFEN 10 MG/1
20 TABLET ORAL DAILY
Qty: 180 TABLET | Refills: 1 | Status: SHIPPED | OUTPATIENT
Start: 2024-09-27

## 2024-09-27 NOTE — PROGRESS NOTES
Patient ID: Dayanna Sneed is a 53 y.o. female.    Assessment/Plan:    Muscle twitching  Patient with intermittent muscle twitching in all of the extremities, usually worse when she is trying to fall asleep or relaxing her body.  EMG was normal with no evidence to support a generalized neurogenic or myopathic process.     No evidence of atrophy, fasciculations or weakness on exam.     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  Reassurance given that her neurologic workup is unremarkable for a more concerning neuromuscular disorder.     Encouraged the patient to stay hydrated, get proper rest, limit caffeine/stimulants.      Tension headache  She is taking baclofen 20mg daily, and using magnesium and B2 supplements regularly.  She uses Excedrin at onset of HA if needed. Reminded patient to limit use of OTC NSAIDs/tylenol to no more than 2-3 times a week to prevent rebound HA.  Encouraged maintaining proper hydration, eating regularly/not skipping meals and getting adequate sleep.  We discussed keeping track/logging headaches to see if any specific triggers can be identified.     She does report snoring, some morning CERVANTES.  Will refer to sleep medicine to ensure no underlying sleep apnea     Patient will return in 6-8 months or sooner if needed     Diagnoses and all orders for this visit:    Tension headache    Muscle twitching    Snoring  -     Ambulatory Referral to Sleep Medicine; Future    Persistent headaches  -     baclofen 10 mg tablet; Take 2 tablets (20 mg total) by mouth daily           Subjective:    HPI    Patient is a 53 year old right handed female who presents today for neurologic follow up.  She was last seen in February 2024.      To review, she was initially evaluated by Dr. Early on 6/15/2020 for several issues.  Patient described that for the past 8-9 months she had been experiencing muscle twitching. She described twitching proximally in any of the 4 extremities although  occasionally will occur in the hands when she is working on the computer. She stated this was particularly apparent when resting.  She had no associated weakness or sensory symptoms. No symptoms to suggest a Lhermitte's phenomenon. No change in bowel or bladder function, speech or swallowing. In reviewing past studies, she was found to be vitamin-D deficient. She was put on a supplement.   Her father has Parkinson's disease. However, she is unaware of any family member with neuro muscular disease.  She also described headaches. These date back a long time were getting worse over the past 8-9 months as well. These are described as generally right frontoparietal in location.  She describes these predominantly as tightness, with rare modest pulsatile component. These have been occurring in the recent months on average of every other day and she does take Excedrin 2 tablets generally every other day, suggesting the possibility analgesic overuse effect as well.     Labs completed 8/13/2020 with normal CK, slightly low aldolase, slightly elevated TSH at 4.25 with normal FT4 1.12, normal magnesium, low vit D (29), normal folate, normal ionized calcium, essentially unremarkable CBC and CMP.    EMG of the LUE/LLE completed 10/14/2020 by Dr. Presley was a normal study with no evidence of a generalized neurogenic or myopathic process effecting the peripheral nervous system.       At timing of visit in Feb 2021 she reported some issues with vertigo.  She has a history of BPPV, but more recently had experienced some episodes of vertigo associated with head pressure that seemed to be brought on by stress.  This did not occur with head movement.  Also with some imbalance.  MRI brain with IACs w and wo contrast was completed 4/14/2021.  Normal appearance of the IACs.  Scattered FLAIR and T2 foci are seen most compatible with chronic microangiopathic changes in patients of this age group.  In 2023 she reported occasionally getting a  "“whooshing” sound in her left ear, occurring on and off. Carotid US was completed 8/7/23 with no evidence of disease in R ICA and <50% stenosis L ICA.     Today, patient reports she is stable. The twitching is the same, no changes. She feels she stays well hydrated, is eating regularly, sleeps well at night.  However she does snore, continues to get headaches, even in the AM. She denies any vision changes, speech or swallowing difficulty, bowel or bladder changes. No recent bouts of vertigo. She is seeing physiatry and PT at [facility] for her low back pain.    The following portions of the patient's history were reviewed and updated as appropriate: current medications, past family history, past medical history, past social history, past surgical history, and problem list.         Objective:    Blood pressure 124/68, pulse 65, temperature (!) 97.2 °F (36.2 °C), temperature source Temporal, resp. rate 18, height 5' 4\" (1.626 m), weight 83.6 kg (184 lb 3.2 oz), SpO2 98%    Physical Exam  Constitutional:       Appearance: Normal appearance.   Eyes:      Extraocular Movements: EOM normal.      Pupils: Pupils are equal, round, and reactive to light.   Neurological:      Mental Status: She is alert.      Motor: Motor strength is normal.     Deep Tendon Reflexes: Reflexes are normal and symmetric.   Psychiatric:         Mood and Affect: Mood normal.         Speech: Speech normal.         Behavior: Behavior normal.         Neurological Exam  Mental Status  Alert. Oriented to person, place, time and situation. Speech is normal. Language is fluent with no aphasia. Attention and concentration are normal.    Cranial Nerves  CN II: Visual fields full to confrontation.  CN III, IV, VI: Extraocular movements intact bilaterally. Pupils equal round and reactive to light bilaterally.  CN V: Facial sensation is normal.  CN VII: Full and symmetric facial movement.  CN VIII: Hearing is normal.  CN IX, X: Palate elevates symmetrically  CN XI: " Shoulder shrug strength is normal.  CN XII: Tongue midline without atrophy or fasciculations.    Motor   Normal muscle tone. Strength is 5/5 throughout all four extremities.    Sensory  Light touch is normal in upper and lower extremities.     Reflexes  Deep tendon reflexes are 2+ and symmetric in all four extremities.    Coordination  Left: Finger-to-nose normal.    Gait  Casual gait is normal including stance, stride, and arm swing.        ROS:    Review of Systems   Constitutional: Negative.  Negative for chills and fever.   HENT: Negative.  Negative for ear pain and sore throat.    Eyes: Negative.  Negative for pain and visual disturbance.   Respiratory: Negative.  Negative for cough and shortness of breath.    Cardiovascular: Negative.  Negative for chest pain and palpitations.   Gastrointestinal: Negative.  Negative for abdominal pain and vomiting.   Endocrine: Negative.    Genitourinary: Negative.  Negative for dysuria and hematuria.   Musculoskeletal: Negative.  Negative for arthralgias and back pain.   Skin: Negative.  Negative for color change and rash.   Allergic/Immunologic: Negative.    Neurological: Negative.  Negative for seizures and syncope.   Hematological: Negative.    Psychiatric/Behavioral: Negative.     All other systems reviewed and are negative.    I personally reviewed and updated the ROS as appropriate        Needs [facility]      No issues or concerns to bring up. No headaches or pressure to note.

## 2024-09-30 NOTE — ASSESSMENT & PLAN NOTE
She is taking baclofen 20mg daily, and using magnesium and B2 supplements regularly.  She uses Excedrin at onset of HA if needed. Reminded patient to limit use of OTC NSAIDs/tylenol to no more than 2-3 times a week to prevent rebound HA.  Encouraged maintaining proper hydration, eating regularly/not skipping meals and getting adequate sleep.  We discussed keeping track/logging headaches to see if any specific triggers can be identified.     She does report snoring, some morning CERVANTES.  Will refer to sleep medicine to ensure no underlying sleep apnea

## 2024-10-07 DIAGNOSIS — E55.9 VITAMIN D DEFICIENCY: ICD-10-CM

## 2024-10-07 DIAGNOSIS — K21.9 GASTROESOPHAGEAL REFLUX DISEASE WITHOUT ESOPHAGITIS: ICD-10-CM

## 2024-10-07 DIAGNOSIS — E03.9 HYPOTHYROIDISM, UNSPECIFIED TYPE: ICD-10-CM

## 2024-10-08 RX ORDER — ERGOCALCIFEROL 1.25 MG/1
50000 CAPSULE, LIQUID FILLED ORAL WEEKLY
Qty: 12 CAPSULE | Refills: 0 | Status: SHIPPED | OUTPATIENT
Start: 2024-10-08

## 2024-10-08 RX ORDER — LEVOTHYROXINE SODIUM 88 UG/1
88 TABLET ORAL DAILY
Qty: 90 TABLET | Refills: 0 | Status: SHIPPED | OUTPATIENT
Start: 2024-10-08

## 2024-10-18 DIAGNOSIS — Z13.1 DIABETES MELLITUS SCREENING: ICD-10-CM

## 2024-10-18 DIAGNOSIS — E78.00 ELEVATED CHOLESTEROL: ICD-10-CM

## 2024-10-18 DIAGNOSIS — E03.9 HYPOTHYROIDISM, UNSPECIFIED TYPE: Primary | ICD-10-CM

## 2024-10-18 DIAGNOSIS — Z00.00 ANNUAL PHYSICAL EXAM: ICD-10-CM

## 2024-10-23 ENCOUNTER — APPOINTMENT (OUTPATIENT)
Dept: LAB | Facility: CLINIC | Age: 54
End: 2024-10-23
Payer: COMMERCIAL

## 2024-10-23 DIAGNOSIS — Z13.1 DIABETES MELLITUS SCREENING: ICD-10-CM

## 2024-10-23 DIAGNOSIS — Z00.00 ANNUAL PHYSICAL EXAM: ICD-10-CM

## 2024-10-23 DIAGNOSIS — E03.9 HYPOTHYROIDISM, UNSPECIFIED TYPE: ICD-10-CM

## 2024-10-23 DIAGNOSIS — E78.00 ELEVATED CHOLESTEROL: ICD-10-CM

## 2024-10-23 LAB
ALBUMIN SERPL BCG-MCNC: 3.8 G/DL (ref 3.5–5)
ALP SERPL-CCNC: 118 U/L (ref 34–104)
ALT SERPL W P-5'-P-CCNC: 30 U/L (ref 7–52)
ANION GAP SERPL CALCULATED.3IONS-SCNC: 11 MMOL/L (ref 4–13)
AST SERPL W P-5'-P-CCNC: 32 U/L (ref 13–39)
BASOPHILS # BLD AUTO: 0.06 THOUSANDS/ΜL (ref 0–0.1)
BASOPHILS NFR BLD AUTO: 1 % (ref 0–1)
BILIRUB SERPL-MCNC: 0.49 MG/DL (ref 0.2–1)
BUN SERPL-MCNC: 15 MG/DL (ref 5–25)
CALCIUM SERPL-MCNC: 9.3 MG/DL (ref 8.4–10.2)
CHLORIDE SERPL-SCNC: 105 MMOL/L (ref 96–108)
CHOLEST SERPL-MCNC: 267 MG/DL
CO2 SERPL-SCNC: 24 MMOL/L (ref 21–32)
CREAT SERPL-MCNC: 0.85 MG/DL (ref 0.6–1.3)
EOSINOPHIL # BLD AUTO: 0.23 THOUSAND/ΜL (ref 0–0.61)
EOSINOPHIL NFR BLD AUTO: 4 % (ref 0–6)
ERYTHROCYTE [DISTWIDTH] IN BLOOD BY AUTOMATED COUNT: 13.6 % (ref 11.6–15.1)
GFR SERPL CREATININE-BSD FRML MDRD: 77 ML/MIN/1.73SQ M
GLUCOSE P FAST SERPL-MCNC: 90 MG/DL (ref 65–99)
HCT VFR BLD AUTO: 41.9 % (ref 34.8–46.1)
HDLC SERPL-MCNC: 63 MG/DL
HGB BLD-MCNC: 13.3 G/DL (ref 11.5–15.4)
IMM GRANULOCYTES # BLD AUTO: 0.03 THOUSAND/UL (ref 0–0.2)
IMM GRANULOCYTES NFR BLD AUTO: 1 % (ref 0–2)
LDLC SERPL CALC-MCNC: 180 MG/DL (ref 0–100)
LYMPHOCYTES # BLD AUTO: 1.4 THOUSANDS/ΜL (ref 0.6–4.47)
LYMPHOCYTES NFR BLD AUTO: 26 % (ref 14–44)
MCH RBC QN AUTO: 28.9 PG (ref 26.8–34.3)
MCHC RBC AUTO-ENTMCNC: 31.7 G/DL (ref 31.4–37.4)
MCV RBC AUTO: 91 FL (ref 82–98)
MONOCYTES # BLD AUTO: 0.5 THOUSAND/ΜL (ref 0.17–1.22)
MONOCYTES NFR BLD AUTO: 9 % (ref 4–12)
NEUTROPHILS # BLD AUTO: 3.19 THOUSANDS/ΜL (ref 1.85–7.62)
NEUTS SEG NFR BLD AUTO: 59 % (ref 43–75)
NRBC BLD AUTO-RTO: 0 /100 WBCS
PLATELET # BLD AUTO: 260 THOUSANDS/UL (ref 149–390)
PMV BLD AUTO: 10 FL (ref 8.9–12.7)
POTASSIUM SERPL-SCNC: 4.6 MMOL/L (ref 3.5–5.3)
PROT SERPL-MCNC: 7.6 G/DL (ref 6.4–8.4)
RBC # BLD AUTO: 4.6 MILLION/UL (ref 3.81–5.12)
SODIUM SERPL-SCNC: 140 MMOL/L (ref 135–147)
T4 FREE SERPL-MCNC: 0.73 NG/DL (ref 0.61–1.12)
TRIGL SERPL-MCNC: 120 MG/DL
TSH SERPL DL<=0.05 MIU/L-ACNC: 7.26 UIU/ML (ref 0.45–4.5)
WBC # BLD AUTO: 5.41 THOUSAND/UL (ref 4.31–10.16)

## 2024-10-23 PROCEDURE — 80061 LIPID PANEL: CPT

## 2024-10-23 PROCEDURE — 84439 ASSAY OF FREE THYROXINE: CPT

## 2024-10-23 PROCEDURE — 84443 ASSAY THYROID STIM HORMONE: CPT

## 2024-10-23 PROCEDURE — 85025 COMPLETE CBC W/AUTO DIFF WBC: CPT

## 2024-10-23 PROCEDURE — 36415 COLL VENOUS BLD VENIPUNCTURE: CPT

## 2024-10-23 PROCEDURE — 80053 COMPREHEN METABOLIC PANEL: CPT

## 2024-10-30 ENCOUNTER — TELEPHONE (OUTPATIENT)
Dept: FAMILY MEDICINE CLINIC | Facility: CLINIC | Age: 54
End: 2024-10-30

## 2024-10-30 ENCOUNTER — OFFICE VISIT (OUTPATIENT)
Dept: FAMILY MEDICINE CLINIC | Facility: CLINIC | Age: 54
End: 2024-10-30
Payer: COMMERCIAL

## 2024-10-30 VITALS
OXYGEN SATURATION: 97 % | DIASTOLIC BLOOD PRESSURE: 98 MMHG | SYSTOLIC BLOOD PRESSURE: 134 MMHG | HEIGHT: 65 IN | TEMPERATURE: 97.6 F | HEART RATE: 63 BPM | WEIGHT: 186.6 LBS | BODY MASS INDEX: 31.09 KG/M2

## 2024-10-30 DIAGNOSIS — E78.2 MIXED HYPERLIPIDEMIA: ICD-10-CM

## 2024-10-30 DIAGNOSIS — E89.0 POSTABLATIVE HYPOTHYROIDISM: ICD-10-CM

## 2024-10-30 DIAGNOSIS — Z12.31 BREAST CANCER SCREENING BY MAMMOGRAM: ICD-10-CM

## 2024-10-30 DIAGNOSIS — Z23 ENCOUNTER FOR IMMUNIZATION: ICD-10-CM

## 2024-10-30 DIAGNOSIS — E03.9 HYPOTHYROIDISM, UNSPECIFIED TYPE: ICD-10-CM

## 2024-10-30 DIAGNOSIS — Z00.00 ANNUAL PHYSICAL EXAM: Primary | ICD-10-CM

## 2024-10-30 PROBLEM — G89.4 CHRONIC PAIN DISORDER: Status: ACTIVE | Noted: 2024-03-11

## 2024-10-30 PROBLEM — M54.17 LUMBOSACRAL RADICULOPATHY: Status: ACTIVE | Noted: 2024-04-30

## 2024-10-30 PROBLEM — M53.3 SACROILIAC JOINT PAIN: Status: ACTIVE | Noted: 2024-03-11

## 2024-10-30 PROBLEM — M54.59 LUMBAR TRIGGER POINT SYNDROME: Status: ACTIVE | Noted: 2023-11-07

## 2024-10-30 PROBLEM — M47.816 ARTHRITIS OF FACET JOINT OF LUMBAR SPINE: Status: ACTIVE | Noted: 2024-03-11

## 2024-10-30 PROCEDURE — 99396 PREV VISIT EST AGE 40-64: CPT | Performed by: FAMILY MEDICINE

## 2024-10-30 PROCEDURE — 90750 HZV VACC RECOMBINANT IM: CPT

## 2024-10-30 PROCEDURE — 90715 TDAP VACCINE 7 YRS/> IM: CPT

## 2024-10-30 PROCEDURE — 90471 IMMUNIZATION ADMIN: CPT

## 2024-10-30 PROCEDURE — 90472 IMMUNIZATION ADMIN EACH ADD: CPT

## 2024-10-30 RX ORDER — LEVOTHYROXINE SODIUM 100 UG/1
100 TABLET ORAL DAILY
Qty: 90 TABLET | Refills: 1 | Status: SHIPPED | OUTPATIENT
Start: 2024-10-30

## 2024-10-30 NOTE — PATIENT INSTRUCTIONS
"Patient Education     Routine physical for adults   The Basics   Written by the doctors and editors at [facility]   What is a physical? -- A physical is a routine visit, or \"check-up,\" with your doctor. You might also hear it called a \"wellness visit\" or \"preventive visit.\"  During each visit, the doctor will:   Ask about your physical and mental health   Ask about your habits, behaviors, and lifestyle   Do an exam   Give you vaccines if needed   Talk to you about any medicines you take   Give advice about your health   Answer your questions  Getting regular check-ups is an important part of taking care of your health. It can help your doctor find and treat any problems you have. But it's also important for preventing health problems.  A routine physical is different from a \"sick visit.\" A sick visit is when you see a doctor because of a health concern or problem. Since physicals are scheduled ahead of time, you can think about what you want to ask the doctor.  How often should I get a physical? -- It depends on your age and health. In general, for people age 21 years and older:   If you are younger than 50 years, you might be able to get a physical every 3 years.   If you are 50 years or older, your doctor might recommend a physical every year.  If you have an ongoing health condition, like diabetes or high blood pressure, your doctor will probably want to see you more often.  What happens during a physical? -- In general, each visit will include:   Physical exam - The doctor or nurse will check your height, weight, heart rate, and blood pressure. They will also look at your eyes and ears. They will ask about how you are feeling and whether you have any symptoms that bother you.   Medicines - It's a good idea to bring a list of all the medicines you take to each doctor visit. Your doctor will talk to you about your medicines and answer any questions. Tell them if you are having any side effects that bother you. You " "should also tell them if you are having trouble paying for any of your medicines.   Habits and behaviors - This includes:   Your diet   Your exercise habits   Whether you smoke, drink alcohol, or use drugs   Whether you are sexually active   Whether you feel safe at home  Your doctor will talk to you about things you can do to improve your health and lower your risk of health problems. They will also offer help and support. For example, if you want to quit smoking, they can give you advice and might prescribe medicines. If you want to improve your diet or get more physical activity, they can help you with this, too.   Lab tests, if needed - The tests you get will depend on your age and situation. For example, your doctor might want to check your:   Cholesterol   Blood sugar   Iron level   Vaccines - The recommended vaccines will depend on your age, health, and what vaccines you already had. Vaccines are very important because they can prevent certain serious or deadly infections.   Discussion of screening - \"Screening\" means checking for diseases or other health problems before they cause symptoms. Your doctor can recommend screening based on your age, risk, and preferences. This might include tests to check for:   Cancer, such as breast, prostate, cervical, ovarian, colorectal, prostate, lung, or skin cancer   Sexually transmitted infections, such as chlamydia and gonorrhea   Mental health conditions like depression and anxiety  Your doctor will talk to you about the different types of screening tests. They can help you decide which screenings to have. They can also explain what the results might mean.   Answering questions - The physical is a good time to ask the doctor or nurse questions about your health. If needed, they can refer you to other doctors or specialists, too.  Adults older than 65 years often need other care, too. As you get older, your doctor will talk to you about:   How to prevent falling at " home   Hearing or vision tests   Memory testing   How to take your medicines safely   Making sure that you have the help and support you need at home  All topics are updated as new evidence becomes available and our peer review process is complete.  This topic retrieved from [employer] on: May 02, 2024.  Topic 460001 Version 1.0  Release: 32.4.3 - C32.122  © 2024 UpToDate, Inc. and/or its affiliates. All rights reserved.  Consumer Information Use and Disclaimer   Disclaimer: This generalized information is a limited summary of diagnosis, treatment, and/or medication information. It is not meant to be comprehensive and should be used as a tool to help the user understand and/or assess potential diagnostic and treatment options. It does NOT include all information about conditions, treatments, medications, side effects, or risks that may apply to a specific patient. It is not intended to be medical advice or a substitute for the medical advice, diagnosis, or treatment of a health care provider based on the health care provider's examination and assessment of a patient's specific and unique circumstances. Patients must speak with a health care provider for complete information about their health, medical questions, and treatment options, including any risks or benefits regarding use of medications. This information does not endorse any treatments or medications as safe, effective, or approved for treating a specific patient. UpToDate, Inc. and its affiliates disclaim any warranty or liability relating to this information or the use thereof.The use of this information is governed by the Terms of Use, available at https://www.[employer] Wirelessuwer.com/en/know/clinical-effectiveness-terms. 2024© UpToDate, Inc. and its affiliates and/or licensors. All rights reserved.  Copyright   © 2024 UpToDate, Inc. and/or its affiliates. All rights reserved.

## 2024-10-30 NOTE — PROGRESS NOTES
Adult Annual Physical  Name: Dayanna Sneed      : 1970      MRN: [id number]  Encounter Provider: Mireille Terrell DO  Encounter Date: 10/30/2024   Encounter department: [facility]    Assessment & Plan  Annual physical exam  Patient is 54 year old female seen for physical exam.       Mixed hyperlipidemia  Chol was 237 last year and now 267 with LDL greater than 180         Postablative hypothyroidism  TSH greater than 7, T4 low normal. Patient very fatigued - will increase Levothyroxine to 100 ug daily. Check blood work in two months.       Encounter for immunization    Orders:    TDAP VACCINE GREATER THAN OR EQUAL TO 8YO IM    Hypothyroidism, unspecified type         Breast cancer screening by mammogram    Orders:    Mammo screening bilateral w 3d and cad; Future    Immunizations and preventive care screenings were discussed with patient today. Appropriate education was printed on patient's after visit summary.    Counseling:  Alcohol/drug use: discussed moderation in alcohol intake, the recommendations for healthy alcohol use, and avoidance of illicit drug use.  Dental Health: discussed importance of regular tooth brushing, flossing, and dental visits.  Exercise: the importance of regular exercise/physical activity was discussed. Recommend exercise 3-5 times per week for at least 30 minutes.     Has appointment in January with sleep medicine.    Return in one year for a physical.       History of Present Illness     Adult Annual Physical:  Patient presents for annual physical. Having problems with lower back - going to [facility] - has had injections..     Diet and Physical Activity:    - Exercise: no formal exercise.    Depression Screening:  - PHQ-2 Score: 0    General Health:  - Sleep:. restless sleeper, snores  - Hearing: normal hearing bilateral ears.  - Vision: no vision problems.  - Dental: regular dental visits.    /GYN Health:    - Menopause: postmenopausal.     Review  "of Systems   Constitutional:  Positive for fatigue. Negative for chills and fever.   HENT:  Negative for congestion and sore throat.    Respiratory:  Negative for chest tightness.    Cardiovascular:  Negative for chest pain and palpitations.   Gastrointestinal:  Negative for abdominal pain, constipation, diarrhea and nausea.   Genitourinary:  Negative for difficulty urinating.   Skin: Negative.    Neurological:  Negative for dizziness and headaches.   Psychiatric/Behavioral: Negative.           Objective     /98 (BP Location: Left arm, Patient Position: Sitting, Cuff Size: Adult)   Pulse 63   Temp 97.6 °F (36.4 °C)   Ht 5' 4.5\" (1.638 m)   Wt 84.6 kg (186 lb 9.6 oz)   LMP  (LMP Unknown)   SpO2 97%   BMI 31.54 kg/m²     Physical Exam  Vitals and nursing note reviewed.   Constitutional:       General: She is not in acute distress.     Appearance: She is well-developed.   HENT:      Head: Normocephalic.      Right Ear: Tympanic membrane normal.      Left Ear: Tympanic membrane normal.      Mouth/Throat:      Pharynx: No posterior oropharyngeal erythema.   Eyes:      General: No scleral icterus.  Neck:      Thyroid: No thyromegaly.      Vascular: No carotid bruit.   Cardiovascular:      Rate and Rhythm: Normal rate and regular rhythm.      Heart sounds: Normal heart sounds. No murmur heard.  Pulmonary:      Effort: Pulmonary effort is normal.      Breath sounds: Normal breath sounds.   Abdominal:      General: Bowel sounds are normal.      Palpations: Abdomen is soft.   Musculoskeletal:      Right lower leg: No edema.      Left lower leg: No edema.   Lymphadenopathy:      Cervical: No cervical adenopathy.   Skin:     General: Skin is warm and dry.   Neurological:      Mental Status: She is alert and oriented to person, place, and time.   Psychiatric:         Mood and Affect: Mood normal.         "

## 2024-10-30 NOTE — ASSESSMENT & PLAN NOTE
TSH greater than 7, T4 low normal. Patient very fatigued - will increase Levothyroxine to 100 ug daily. Check blood work in two months.

## 2024-12-12 DIAGNOSIS — R53.83 OTHER FATIGUE: ICD-10-CM

## 2024-12-12 DIAGNOSIS — R74.8 ELEVATED ALKALINE PHOSPHATASE LEVEL: ICD-10-CM

## 2024-12-12 DIAGNOSIS — E89.0 POSTABLATIVE HYPOTHYROIDISM: Primary | ICD-10-CM

## 2024-12-12 DIAGNOSIS — E55.9 VITAMIN D DEFICIENCY: ICD-10-CM

## 2024-12-21 ENCOUNTER — HOSPITAL ENCOUNTER (OUTPATIENT)
Dept: MAMMOGRAPHY | Facility: CLINIC | Age: 54
Discharge: HOME | End: 2024-12-21
Payer: COMMERCIAL

## 2024-12-21 VITALS — WEIGHT: 186 LBS | BODY MASS INDEX: 31.76 KG/M2 | HEIGHT: 64 IN

## 2024-12-21 DIAGNOSIS — Z12.31 BREAST CANCER SCREENING BY MAMMOGRAM: ICD-10-CM

## 2024-12-21 PROCEDURE — 77067 SCR MAMMO BI INCL CAD: CPT

## 2024-12-21 PROCEDURE — 77063 BREAST TOMOSYNTHESIS BI: CPT

## 2024-12-24 DIAGNOSIS — F41.9 ANXIETY: ICD-10-CM

## 2024-12-27 ENCOUNTER — APPOINTMENT (OUTPATIENT)
Dept: LAB | Facility: CLINIC | Age: 54
End: 2024-12-27
Payer: COMMERCIAL

## 2024-12-27 DIAGNOSIS — E55.9 VITAMIN D DEFICIENCY: ICD-10-CM

## 2024-12-27 DIAGNOSIS — E89.0 POSTABLATIVE HYPOTHYROIDISM: ICD-10-CM

## 2024-12-27 DIAGNOSIS — R74.8 ELEVATED ALKALINE PHOSPHATASE LEVEL: ICD-10-CM

## 2024-12-27 DIAGNOSIS — R53.83 OTHER FATIGUE: ICD-10-CM

## 2024-12-27 LAB
25(OH)D3 SERPL-MCNC: 45.4 NG/ML (ref 30–100)
FERRITIN SERPL-MCNC: 28 NG/ML (ref 11–307)
IRON SATN MFR SERPL: 15 % (ref 15–50)
IRON SERPL-MCNC: 61 UG/DL (ref 50–212)
T4 FREE SERPL-MCNC: 0.89 NG/DL (ref 0.61–1.12)
TIBC SERPL-MCNC: 403.2 UG/DL (ref 250–450)
TRANSFERRIN SERPL-MCNC: 288 MG/DL (ref 203–362)
TSH SERPL DL<=0.05 MIU/L-ACNC: 3.27 UIU/ML (ref 0.45–4.5)
UIBC SERPL-MCNC: 342 UG/DL (ref 155–355)
VIT B12 SERPL-MCNC: 619 PG/ML (ref 180–914)

## 2024-12-27 PROCEDURE — 84080 ASSAY ALKALINE PHOSPHATASES: CPT

## 2024-12-27 PROCEDURE — 83540 ASSAY OF IRON: CPT

## 2024-12-27 PROCEDURE — 84075 ASSAY ALKALINE PHOSPHATASE: CPT

## 2024-12-27 PROCEDURE — 82607 VITAMIN B-12: CPT

## 2024-12-27 PROCEDURE — 36415 COLL VENOUS BLD VENIPUNCTURE: CPT

## 2024-12-27 PROCEDURE — 82728 ASSAY OF FERRITIN: CPT

## 2024-12-27 PROCEDURE — 84443 ASSAY THYROID STIM HORMONE: CPT

## 2024-12-27 PROCEDURE — 83550 IRON BINDING TEST: CPT

## 2024-12-27 PROCEDURE — 82306 VITAMIN D 25 HYDROXY: CPT

## 2024-12-27 PROCEDURE — 84439 ASSAY OF FREE THYROXINE: CPT

## 2025-01-09 LAB
ALP BONE CFR SERPL: 39 % (ref 14–68)
ALP INTEST CFR SERPL: 0 % (ref 0–18)
ALP LIVER CFR SERPL: 61 % (ref 18–85)
ALP SERPL-CCNC: 163 IU/L (ref 44–121)

## 2025-01-13 DIAGNOSIS — E03.9 HYPOTHYROIDISM, UNSPECIFIED TYPE: ICD-10-CM

## 2025-01-13 DIAGNOSIS — E55.9 VITAMIN D DEFICIENCY: ICD-10-CM

## 2025-01-13 DIAGNOSIS — R51.9 PERSISTENT HEADACHES: ICD-10-CM

## 2025-01-13 DIAGNOSIS — R42 VERTIGO: ICD-10-CM

## 2025-01-13 DIAGNOSIS — K21.9 GASTROESOPHAGEAL REFLUX DISEASE WITHOUT ESOPHAGITIS: ICD-10-CM

## 2025-01-13 RX ORDER — ERGOCALCIFEROL 1.25 MG/1
50000 CAPSULE, LIQUID FILLED ORAL WEEKLY
Qty: 12 CAPSULE | Refills: 1 | Status: SHIPPED | OUTPATIENT
Start: 2025-01-13

## 2025-01-13 RX ORDER — LEVOTHYROXINE SODIUM 100 UG/1
100 TABLET ORAL DAILY
Qty: 90 TABLET | Refills: 1 | Status: SHIPPED | OUTPATIENT
Start: 2025-01-13

## 2025-01-13 RX ORDER — MECLIZINE HYDROCHLORIDE 25 MG/1
25 TABLET ORAL EVERY 8 HOURS PRN
Qty: 90 TABLET | Refills: 1 | Status: SHIPPED | OUTPATIENT
Start: 2025-01-13

## 2025-01-14 ENCOUNTER — CLINICAL SUPPORT (OUTPATIENT)
Dept: FAMILY MEDICINE CLINIC | Facility: CLINIC | Age: 55
End: 2025-01-14
Payer: COMMERCIAL

## 2025-01-14 DIAGNOSIS — Z23 ENCOUNTER FOR IMMUNIZATION: Primary | ICD-10-CM

## 2025-01-14 PROCEDURE — 90471 IMMUNIZATION ADMIN: CPT

## 2025-01-14 PROCEDURE — 90750 HZV VACC RECOMBINANT IM: CPT

## 2025-01-15 RX ORDER — BACLOFEN 10 MG/1
20 TABLET ORAL DAILY
Qty: 180 TABLET | Refills: 1 | Status: SHIPPED | OUTPATIENT
Start: 2025-01-15

## 2025-01-22 DIAGNOSIS — R53.83 OTHER FATIGUE: Primary | ICD-10-CM

## 2025-01-22 DIAGNOSIS — U09.9 POST COVID-19 CONDITION, UNSPECIFIED: ICD-10-CM

## 2025-02-24 ENCOUNTER — HOSPITAL ENCOUNTER (OUTPATIENT)
Dept: NON INVASIVE DIAGNOSTICS | Facility: HOSPITAL | Age: 55
Discharge: HOME | End: 2025-02-24
Payer: COMMERCIAL

## 2025-02-24 VITALS
HEIGHT: 64 IN | BODY MASS INDEX: 31.77 KG/M2 | WEIGHT: 186.07 LBS | SYSTOLIC BLOOD PRESSURE: 134 MMHG | DIASTOLIC BLOOD PRESSURE: 86 MMHG

## 2025-02-24 DIAGNOSIS — R53.83 OTHER FATIGUE: ICD-10-CM

## 2025-02-24 DIAGNOSIS — U09.9 POST COVID-19 CONDITION, UNSPECIFIED: ICD-10-CM

## 2025-02-24 PROCEDURE — 93306 TTE W/DOPPLER COMPLETE: CPT

## 2025-02-24 PROCEDURE — 93306 TTE W/DOPPLER COMPLETE: CPT | Performed by: INTERNAL MEDICINE

## 2025-02-25 LAB
AORTIC ROOT: 3.7 CM
AORTIC VALVE MEAN VELOCITY: 9.9 M/S
ASCENDING AORTA: 3.3 CM
AV AREA BY CONTINUOUS VTI: 2.4 CM2
AV AREA PEAK VELOCITY: 2.5 CM2
AV LVOT MEAN GRADIENT: 2 MMHG
AV LVOT PEAK GRADIENT: 5 MMHG
AV MEAN PRESS GRAD SYS DOP V1V2: 5 MMHG
AV ORIFICE AREA US: 2.42 CM2
AV PEAK GRADIENT: 9 MMHG
AV VELOCITY RATIO: 0.7
AV VMAX SYS DOP: 1.51 M/S
BSA FOR ECHO PROCEDURE: 1.9 M2
DOP CALC AO VTI: 34.55 CM
DOP CALC LVOT AREA: 3.46 CM2
DOP CALC LVOT CARDIAC INDEX: 2.27 L/MIN/M2
DOP CALC LVOT CARDIAC OUTPUT: 4.31 L/MIN
DOP CALC LVOT DIAMETER: 2.1 CM
DOP CALC LVOT PEAK VEL VTI: 24.12 CM
DOP CALC LVOT PEAK VEL: 1.1 M/S
DOP CALC LVOT STROKE INDEX: 42.6 ML/M2
DOP CALC LVOT STROKE VOLUME: 83.5
E WAVE DECELERATION TIME: 318 MS
E/A RATIO: 0.82
FRACTIONAL SHORTENING: 26 (ref 28–44)
INTERVENTRICULAR SEPTUM IN DIASTOLE (PARASTERNAL SHORT AXIS VIEW): 1 CM
INTERVENTRICULAR SEPTUM: 1 CM (ref 0.6–1.1)
LAAS-AP2: 20.7 CM2
LAAS-AP4: 23.3 CM2
LEFT ATRIUM VOLUME (MOD BIPLANE): 71 ML
LEFT ATRIUM VOLUME INDEX (MOD BIPLANE): 37.4 ML/M2
LEFT INTERNAL DIMENSION IN SYSTOLE: 2.9 CM (ref 2.1–4)
LEFT VENTRICLE DIASTOLIC VOLUME (MOD BIPLANE): 96 ML
LEFT VENTRICLE DIASTOLIC VOLUME INDEX (MOD BIPLANE): 50.5 ML/M2
LEFT VENTRICLE SYSTOLIC VOLUME (MOD BIPLANE): 50 ML
LEFT VENTRICLE SYSTOLIC VOLUME INDEX (MOD BIPLANE): 26.3 ML/M2
LEFT VENTRICULAR INTERNAL DIMENSION IN DIASTOLE: 3.9 CM (ref 3.5–6)
LEFT VENTRICULAR POSTERIOR WALL IN END DIASTOLE: 1.2 CM
LEFT VENTRICULAR STROKE VOLUME: 31 ML
LV EF BIPLANE MOD: 48 %
LV EF US.2D.A4C+ESTIMATED: 40 %
LVSV (TEICH): 31 ML
MV E'TISSUE VEL-LAT: 16 CM/S
MV E'TISSUE VEL-SEP: 7 CM/S
MV PEAK A VEL: 0.91 M/S
MV PEAK E VEL: 75 CM/S
MV STENOSIS PRESSURE HALF TIME: 92 MS
MV VALVE AREA P 1/2 METHOD: 2.39
RA PRESSURE ESTIMATED: 3 MMHG
RIGHT ATRIUM AREA SYSTOLE A4C: 11.8 CM2
RIGHT VENTRICLE ID DIMENSION: 3.3 CM
RV PSP: 25 MMHG
SL CV LEFT ATRIUM LENGTH A2C: 5.4 CM
SL CV LV EF: 48
SL CV PED ECHO LEFT VENTRICLE DIASTOLIC VOLUME (MOD BIPLANE) 2D: 64 ML
SL CV PED ECHO LEFT VENTRICLE SYSTOLIC VOLUME (MOD BIPLANE) 2D: 33 ML
TR MAX PG: 22 MMHG
TR PEAK VELOCITY: 2.3 M/S
TRICUSPID VALVE PEAK REGURGITATION VELOCITY: 2.32 M/S

## 2025-03-05 DIAGNOSIS — R93.1 DECREASED CARDIAC EJECTION FRACTION: Primary | ICD-10-CM

## 2025-03-05 DIAGNOSIS — R93.1 ABNORMAL ECHOCARDIOGRAM: ICD-10-CM

## 2025-04-04 DIAGNOSIS — F41.9 ANXIETY: ICD-10-CM

## 2025-04-15 ENCOUNTER — TELEPHONE (OUTPATIENT)
Age: 55
End: 2025-04-15

## 2025-04-24 ENCOUNTER — OFFICE VISIT (OUTPATIENT)
Dept: SLEEP CENTER | Facility: CLINIC | Age: 55
End: 2025-04-24
Payer: COMMERCIAL

## 2025-04-24 VITALS
HEART RATE: 64 BPM | HEIGHT: 64 IN | WEIGHT: 189.3 LBS | BODY MASS INDEX: 32.32 KG/M2 | OXYGEN SATURATION: 98 % | DIASTOLIC BLOOD PRESSURE: 80 MMHG | SYSTOLIC BLOOD PRESSURE: 126 MMHG

## 2025-04-24 DIAGNOSIS — E66.811 CLASS 1 OBESITY: ICD-10-CM

## 2025-04-24 DIAGNOSIS — G47.19 EXCESSIVE DAYTIME SLEEPINESS: ICD-10-CM

## 2025-04-24 DIAGNOSIS — R06.83 SNORING: Primary | ICD-10-CM

## 2025-04-24 DIAGNOSIS — R06.81 WITNESSED EPISODE OF APNEA: ICD-10-CM

## 2025-04-24 DIAGNOSIS — Z91.89 AT RISK FOR CENTRAL SLEEP APNEA: ICD-10-CM

## 2025-04-24 DIAGNOSIS — G25.81 RLS (RESTLESS LEGS SYNDROME): ICD-10-CM

## 2025-04-24 DIAGNOSIS — E61.1 IRON DEFICIENCY: ICD-10-CM

## 2025-04-24 PROCEDURE — 99204 OFFICE O/P NEW MOD 45 MIN: CPT

## 2025-04-24 NOTE — PROGRESS NOTES
[facility]  Sleep Medicine New Patient Evaluation    PATIENT NAME: Dayanna Sneed  DATE OF SERVICE: April 24, 2025    CONSULTING PROVIDER:  COLETTE Pride-PAULA  [address] [place] [address]    ASSESSMENT/PLAN:  Dayanna Sneed is a 54 y.o. female with PMHx of cardiomyopathy (LVEF 48%), HLD, hypothyroidism, GERD and obesity who presents for sleep evaluation.    Snoring  Witnessed episode of apnea  Excessive daytime sleepiness  At risk for central sleep apnea  - The patient reports symptoms concerning for sleep apnea and is at risk for both IVANIA and CSA given recently discovered reduced LVEF (48%).  - Will obtain PSG given risk for CSA.  - Discussed with the patient the possible diagnosis, causes and conditions associated with SDB along with potential treatments.  The patient is amenable to discussing results/treatment plan via phone/MyChart.  - She would consider PAP therapy if needed and would prefer nasal/nasal pillows interface.  - Encouraged healthy lifestyle with adequate sleep (7-9 hours per night), healthy balanced diet and routine exercise.  - Follow-up after sleep study.  -     Ambulatory Referral to Sleep Medicine  -     Diagnostic Sleep Study; Future    RLS (restless legs syndrome)  Iron deficiency  - She does note symptoms consistent with RLS that are occurring infrequently, but are bothersome when they do occur.  She has a prior iron panel from 12/2024 with ferritin < 75 ng/mL, but was not anemic on most recent CBC.  - Recommend starting vitron-C 1 tablet every other night on an empty stomach, will recheck iron studies in 3 months.  - We discussed that if she does have sleep apnea treatment may also help limit these symptoms.  - She is taking an OTC sleep aid which is likely an antihistamine, we discussed antihistamines can exacerbate RLS.  She is also on sertraline.  -     Iron Panel (Includes Ferritin, Iron Sat%, Iron, and TIBC); Future    Class 1 obesity  -  Weight loss is recommended, we discussed the bidirectional history between obesity and sleep apnea and that with weight loss sleep apnea may resolve.    Thank you for allowing me to participate in the care of your patient.  If there are any questions regarding evaluation please feel free to reach out.   ________________________________________________________________________________________________    HPI: Dayanna Sneed is a 54 y.o. female with PMHx of HFrEF (LVEF 48%), HLD, hypothyroidism, GERD and obesity who presents for sleep evaluation.  Sleep-Related History: No prior sleep studies or sleep consults.    The patient was referred by her neurologist due to snoring.  She notes that the quality of her sleep is poor and she has trouble staying asleep at night.  She states she generally does not have trouble falling asleep, but wakes up frequently throughout the night for unclear reasons.  She has been told about loud snoring and her  has witnessed apneas in the past.  She can wake up with headaches and dry mouth and is generally unrefreshed by sleep although may feel better after a nap.  She does take a nightly OTC sleep aid to help her with falling back to sleep after her awakenings.  She believes her symptoms have worsened after menopause and with recent weight gain and she generally feels best when she is ~140lbs.  She otherwise denies SP, HH, cataplexy, and parasomnias.    She reports having an urge to move the legs that only occurs in the evening.  Symptoms begin at 5052-9946 and last for up to a few hours.  The urge to move the legs begins or worsens during periods of rest or inactivity (e.g. lying or sitting), and  is relieved by movements such as walking or stretching. The urge to move the legs occurs 1-2 nights per month and began a number of years ago.  She has not been told that she kicks her legs during sleep.    There is history of iron deficiency or anemia.  Lab Results   Component Value  Date    IRON 61 12/27/2024    TIBC 403.2 12/27/2024    FERRITIN 28 12/27/2024   %Sat 15% on 12/27/2024     ESS: Total score: (Patient-Rptd) 7/24  Greater or equal to 10 is positive for excessive daytime sleepiness  Pertinent Meds: OTC sleep aid (probably an antihistamine), sertraline 50mg qAM    Sleep-Wake Schedule:  She is self-described as a morning person.  Bedtime: 2200  Wake Time: 0600  Difficulty Falling Asleep: No  Avg Number of Awakenings: 5-6x a night  Cause of Awakenings: unclear  Weekend Sleep Schedule: 6491-6114  Naps: denies, but would if she had the opportunity.  On the rare occasion she naps she does feel better.    Avg TST per 24 hours: 7 hours    Sleep-Related Details:  Preferred Sleep Position: side(s)  SDB Symptoms: snoring, witnessed apneas, multiple awakenings, morning headaches, dry mouth/nose, and waking unrefreshed  Bruxism: Yes, not currently wearing anything  Nocturnal GERD: well controlled on omeprazole  Nocturnal Palpitations: Yes, does occasionally have racing heart sensation  Sleep-Related Hallucinations: No   Sleep Paralysis: No   Cataplexy: No     She denies any parasomnia activity.    Wake-Related Details  Daytime Sleepiness: Yes, usually worse in the afternoon    Work Schedule: , daytime hours  Drowsy Driving: No  Caffeine: Yes, 16oz coffee in the morning  Alcohol Use: rare social use  Substance Use: No  Tobacco Use: No  Weight Change: ~50lbs over the last few years    She does have difficulty with memory.    Past Treatments:  None    Prior Sleep Studies:  None    Other Relevant Labs and Studies:  None    Past Medical History:   Diagnosis Date    Disease of thyroid gland     Graves disease     Headache(784.0)     Herpes simplex infection     Migraine without aura and responsive to treatment     Postablative hypothyroidism     Varicella without complication      Past Surgical History:   Procedure Laterality Date    HYSTERECTOMY      age 38    MYOMECTOMY      NEUROPLASTY /  TRANSPOSITION MEDIAN NERVE AT CARPAL TUNNEL Right     TOOTH EXTRACTION      UPPER GASTROINTESTINAL ENDOSCOPY       Patient Active Problem List   Diagnosis    BPPV (benign paroxysmal positional vertigo)    Dietary calcium deficiency    Esophageal reflux    Hyperlipidemia    Hypothyroidism    Perimenopause    Anxiety    Muscle twitching    Tension headache    Low vitamin D level    Vertigo    Viral infection, unspecified    Obesity, Class I, BMI 30-34.9    Elevated liver function tests    Fatty liver    Tinnitus of left ear    Arthritis of facet joint of lumbar spine    Chronic pain disorder    Lumbar trigger point syndrome    Lumbosacral radiculopathy    Sacroiliac joint pain     Allergies as of 04/24/2025    (No Known Allergies)     REVIEW OF SYSTEMS:  Review of Systems  10-point system review completed, all of which are negative except as mentioned above.    CURRENT MEDICATIONS:  Current Outpatient Medications   Medication Instructions    baclofen 20 mg, Oral, Daily    bisacodyl (DULCOLAX) 10 mg, Oral, Daily PRN    ergocalciferol (VITAMIN D2) 50,000 Units, Oral, Weekly    levothyroxine 100 mcg, Oral, Daily    meclizine (ANTIVERT) 25 mg, Oral, Every 8 hours PRN    Multiple Vitamins-Minerals (DAILY MULTI PO) Take by mouth    omeprazole (PRILOSEC) 20 mg, Oral, Daily    sertraline (ZOLOFT) 50 mg, Oral, Daily     SOCIAL HISTORY:  Social History     Tobacco Use    Smoking status: Never    Smokeless tobacco: Never   Vaping Use    Vaping status: Never Used   Substance Use Topics    Alcohol use: Yes     Comment: drink a few glasses of wine on special occasions    Drug use: No     FAMILY HISTORY:  Family History   Problem Relation Age of Onset    Depression Mother     Migraines Mother     Osteopenia Mother     Aortic aneurysm Father     Hyperlipidemia Father     Hypertension Father     Heart disease Father         ischemic    Thyroid disease Father     Stroke Father     Transient ischemic attack Father     Anxiety disorder  "Brother     Depression Brother     Lung cancer Maternal Uncle         70's    Skin cancer Maternal Uncle         70's    No Known Problems Paternal Aunt     Thyroid disease Paternal Uncle     Diabetes Maternal Grandmother     Stroke Maternal Grandmother     Skin cancer Maternal Grandfather         80's or 90's    Stroke Maternal Grandfather     Osteoporosis Paternal Grandmother     No Known Problems Paternal Grandfather     Anxiety disorder Son     ADD / ADHD Son     Thyroid disease Cousin      Family History of Sleep Disorders: brother sleep apnea, father lots of sleep apnea symptoms, but never tested    PHYSICAL EXAMINATION:  Vital Signs:  /80 (BP Location: Left arm, Patient Position: Sitting, Cuff Size: Large)   Pulse 64   Ht 5' 4\" (1.626 m)   Wt 85.9 kg (189 lb 4.8 oz)   LMP  (LMP Unknown)   SpO2 98%   BMI 32.49 kg/m²   Body mass index is 32.49 kg/m².    Constitutional: NAD, well appearing, obese   Mental Status: AAOx3  Neck Circumference: Neck Circumference: 15 inches  Skin: Warm, dry, no rashes noted   Eyes: PERRL, normal conjunctiva  ENT: Nasal congestion present, nasal valve incompetence absent.  Posterior Airspace:   Garcia Tongue Position: 4  Retrognathia: absent  Overbite: absent  High Arched Palate: absent  Tongue Scalloping/Ridging: present  Tonsils: 1+  Uvula: normal  Chest: RRR, +S1/S2, CTA B/L, no W/R/R, no M/R/G   Extremities: No digital clubbing or pedal edema      Martina Ferrell MD  Pulmonary-Critical Care and Sleep Medicine  04/24/25    Portions of the record may have been created with voice recognition software. Occasional wrong word or \"sound a like\" substitutions may have occurred due to the inherent limitations of voice recognition software. Please read the chart carefully and recognize, using context, where substitutions have occurred.   "

## 2025-04-24 NOTE — PATIENT INSTRUCTIONS
- Start taking vitron-C 1 tablet every other night on an empty stomach  - Repeat iron studies in 3 months  - An in lab sleep study has been ordered to evaluate for sleep apnea.  This test should be scheduled at your earliest convenience.    - You may have sleep apnea.  Sleep apnea is a serious sleep disorder that occurs when a person's breathing is interrupted during sleep.  People with untreated sleep apnea stop breathing repeatedly during sleep.  - The most common type of sleep apnea is obstructive sleep apnea.  - If left untreated, obstructive sleep apnea can result in a number of health problems including high blood pressure, stroke, irregular heart rhythms, heart failure, diabetes, obesity and heart attacks.  - Treatment options for obstructive sleep apnea may include positive airway pressure (PAP) therapy, oral appliances, hypoglossal nerve stimulator, nose/throat surgery, weight loss, side sleeping or avoidance of medications or substances that can relax the airway muscles (alcohol, benzodiazepines and opioids).  - Avoid driving is drowsy.  It is recommended that if you are dozing while driving that you do not drive until your sleepiness is appropriately treated.  - It is important to lead a healthy lifestyle with adequate sleep (7-9 hours per night), balanced diet and routine exercise.

## 2025-05-02 ENCOUNTER — CONSULT (OUTPATIENT)
Dept: PAIN MEDICINE | Facility: MEDICAL CENTER | Age: 55
End: 2025-05-02

## 2025-05-02 VITALS — WEIGHT: 189 LBS | BODY MASS INDEX: 32.27 KG/M2 | HEIGHT: 64 IN

## 2025-05-02 DIAGNOSIS — M54.50 CHRONIC LEFT-SIDED LOW BACK PAIN WITHOUT SCIATICA: Primary | ICD-10-CM

## 2025-05-02 DIAGNOSIS — G89.29 CHRONIC LEFT-SIDED LOW BACK PAIN WITHOUT SCIATICA: Primary | ICD-10-CM

## 2025-05-02 DIAGNOSIS — M47.816 LUMBAR SPONDYLOSIS: ICD-10-CM

## 2025-05-02 NOTE — PROGRESS NOTES
Assessment  1. Chronic left-sided low back pain without sciatica    2. Lumbar spondylosis        Plan  The patient has been experiencing moderate to severe axial spine pain that is causing functional deficit.  The pain has been present for at least 3 months and is not improving with conservative care including one or more of the following: home exercise regimen, physician directed home exercise program, physical therapy, or chiropractic care.  Currently the patient is not experiencing any radicular features nor neurogenic claudication.  Non-facet pathology has been ruled out on clinical evaluation.     We will schedule the patient for left L3-5 medial branch nerve blocks with intention of moving forward towards radiofrequency ablation if there is an appropriate diagnostic response. The initial blocks will be performed with 2% lidocaine and if an appropriate response is obtained upon review of the patient's pain diary, a confirmatory block will be scheduled with 0.5% bupivacaine at least 2 weeks after the initial block.    In the office today, we reviewed the nature of facet joint pathology in depth using a spine model. We discussed the approach we would use for the injections and provided literature for home review. The patient understands the risks associated with the procedure including bleeding, infection, tissue injury, and allergic reaction and provided verbal informed consent in the office today.    My impressions and treatment recommendations were discussed in detail with the patient who verbalized understanding and had no further questions.  Discharge instructions were provided. I personally saw and examined the patient and I agree with the above discussed plan of care.    Orders Placed This Encounter   Procedures    FL spine and pain procedure     Standing Status:   Future     Expiration Date:   5/2/2026     Reason for Exam::   (L) L3-5 MBB#1     Anticoagulant hold needed?:   no     No orders of the defined  types were placed in this encounter.      History of Present Illness    Dayanna Sneed is a 54 y.o. female seen in consultation at the request of Dr. Terrell regarding chronic lower back pain.  The patient's been experiencing symptoms for the past 2-1/2 years without any inciting event or trauma.  She describes moderate to severe intensity pain currently rated as an 8/10 which is nearly constant without any typical pattern characterized as sharp as well as dull aching type pain localized to the left lower back without radicular features.    Aggravating factors include bending sitting walking exercise.  Alleviating factors include lying down and relaxation.    Diagnostic studies include MRI of the lumbar spine.  She does have some facet arthropathy.    She has tried physical therapy exercise without relief and moderate relief from local heat or ice application.  She has undergone lumbar facet joint injections in the past with Dr. Georges from [facility].    Social history negative for tobacco marijuana and alcohol use.    Currently using acetaminophen and ibuprofen for pain relief and these do provide some mild benefit.  She is hopeful for further relief with interventional approaches.    I have personally reviewed and/or updated the patient's past medical history, past surgical history, family history, social history, current medications, allergies, and vital signs today.     Review of Systems   Constitutional:  Positive for unexpected weight change. Negative for fatigue.   HENT:  Negative for ear pain, hearing loss and sinus pain.    Eyes:  Negative for redness and visual disturbance.   Respiratory:  Negative for shortness of breath and wheezing.    Cardiovascular:  Negative for chest pain and palpitations.   Gastrointestinal:  Negative for abdominal pain, diarrhea and rectal pain.   Endocrine: Negative for polydipsia and polyuria.   Genitourinary:  Negative for difficulty urinating and frequency.    Musculoskeletal:  Negative for gait problem and myalgias.   Skin:  Negative for rash.   Allergic/Immunologic: Negative for food allergies.   Neurological:  Positive for headaches. Negative for numbness.   Psychiatric/Behavioral:  Negative for decreased concentration and sleep disturbance. The patient is nervous/anxious.        Patient Active Problem List   Diagnosis    BPPV (benign paroxysmal positional vertigo)    Dietary calcium deficiency    Esophageal reflux    Hyperlipidemia    Hypothyroidism    Perimenopause    Anxiety    Muscle twitching    Tension headache    Low vitamin D level    Vertigo    Viral infection, unspecified    Obesity, Class I, BMI 30-34.9    Elevated liver function tests    Fatty liver    Tinnitus of left ear    Arthritis of facet joint of lumbar spine    Chronic pain disorder    Lumbar trigger point syndrome    Lumbosacral radiculopathy    Sacroiliac joint pain       Past Medical History:   Diagnosis Date    Disease of thyroid gland     Graves disease     Headache(784.0)     Herpes simplex infection     Migraine without aura and responsive to treatment     Postablative hypothyroidism     Varicella without complication        Past Surgical History:   Procedure Laterality Date    HYSTERECTOMY      age 38    MYOMECTOMY      NEUROPLASTY / TRANSPOSITION MEDIAN NERVE AT CARPAL TUNNEL Right     TOOTH EXTRACTION      UPPER GASTROINTESTINAL ENDOSCOPY         Family History   Problem Relation Age of Onset    Depression Mother     Migraines Mother     Osteopenia Mother     Aortic aneurysm Father     Hyperlipidemia Father     Hypertension Father     Heart disease Father         ischemic    Thyroid disease Father     Stroke Father     Transient ischemic attack Father     Anxiety disorder Brother     Depression Brother     Lung cancer Maternal Uncle         70's    Skin cancer Maternal Uncle         70's    No Known Problems Paternal Aunt     Thyroid disease Paternal Uncle     Diabetes Maternal  "Grandmother     Stroke Maternal Grandmother     Skin cancer Maternal Grandfather         80's or 90's    Stroke Maternal Grandfather     Osteoporosis Paternal Grandmother     No Known Problems Paternal Grandfather     Anxiety disorder Son     ADD / ADHD Son     Thyroid disease Cousin        Social History     Occupational History    Not on file   Tobacco Use    Smoking status: Never    Smokeless tobacco: Never   Vaping Use    Vaping status: Never Used   Substance and Sexual Activity    Alcohol use: Yes     Comment: drink a few glasses of wine on special occasions    Drug use: No    Sexual activity: Not Currently     Partners: Male     Birth control/protection: Surgical       Current Outpatient Medications on File Prior to Visit   Medication Sig    baclofen 10 mg tablet Take 2 tablets (20 mg total) by mouth daily    bisacodyl (DULCOLAX) 5 mg EC tablet Take 2 tablets (10 mg total) by mouth daily as needed for constipation for up to 1 dose    ergocalciferol (VITAMIN D2) 50,000 units Take 1 capsule (50,000 Units total) by mouth once a week    levothyroxine 100 mcg tablet Take 1 tablet (100 mcg total) by mouth daily    meclizine (ANTIVERT) 25 mg tablet Take 1 tablet (25 mg total) by mouth every 8 (eight) hours as needed for dizziness    Multiple Vitamins-Minerals (DAILY MULTI PO) Take by mouth    omeprazole (PriLOSEC) 20 mg delayed release capsule Take 1 capsule (20 mg total) by mouth daily    sertraline (ZOLOFT) 50 mg tablet Take 1 tablet (50 mg total) by mouth daily     No current facility-administered medications on file prior to visit.       No Known Allergies    Physical Exam    Ht 5' 4\" (1.626 m)   Wt 85.7 kg (189 lb)   LMP  (LMP Unknown)   BMI 32.44 kg/m²     Constitutional: normal, well developed, well nourished, alert, in no distress and non-toxic and no overt pain behavior.  Eyes: anicteric  HEENT: grossly intact  Neck: supple, symmetric, trachea midline and no masses   Pulmonary:even and " unlabored  Cardiovascular:No edema or pitting edema present  Psychiatric:Mood and affect appropriate  Neurologic:Cranial Nerves II-XII grossly intact, patient able to stand on heels and toes without difficulty  Musculoskeletal:normal, except for pain reproduced with lumbar extension as well as extension with rotation towards the left    Imaging    Narrative & Impression   MRI LUMBAR SPINE WITHOUT CONTRAST     INDICATION: M54.17: Radiculopathy, lumbosacral region.     COMPARISON: Sacroiliac radiographs 1/26/2024. Lumbar radiographs 1/17/2024     TECHNIQUE:  Multiplanar, multisequence imaging of the lumbar spine was performed. .        IMAGE QUALITY:  Diagnostic     FINDINGS:     VERTEBRAL BODIES:  There are 5 lumbar type vertebral bodies.  Normal alignment of the lumbar spine.  No spondylolysis or spondylolisthesis. No scoliosis.  No compression fracture.    Modic type I endplate changes are noted at the L2-3 and L4-5 levels.   Inferior L2 endplate Schmorl's node is noted.     SACRUM:  Normal signal within the sacrum. No evidence of insufficiency or stress fracture.     DISTAL CORD AND CONUS:  Normal size and signal within the distal cord and conus.     PARASPINAL SOFT TISSUES:  Paraspinal soft tissues are unremarkable.     LOWER THORACIC DISC SPACES:  Normal disc height and signal.  No disc herniation, canal stenosis or foraminal narrowing.     LUMBAR DISC SPACES:     L1-2: No focal disc herniation, central canal stenosis, or neural foraminal narrowing.     L2-3: Mild diffuse disc bulge extending into the foraminal regions.  No central canal or  subarticular/lateral recess narrowing.  No neural foraminal stenosis.     L3-4: Mild diffuse disc bulge extending into the foraminal regions.  No central canal or  subarticular/lateral recess narrowing. Mild right neural foraminal stenosis.     L4-5: Mild to moderate diffuse disc bulge extending into the foraminal regions. Bilateral ligamentum flavum and facet hypertrophy.  Mild to moderate central canal and bilateral subarticular/lateral recess narrowing. Mild bilateral neural foraminal   stenosis.     L5-S1: No focal disc herniation, central canal stenosis, or neural foraminal narrowing.     IMPRESSION:     Mild multilevel lumbar degenerative disc disease as detailed. Mild to moderate central canal and bilateral subarticular/lateral recess stenosis is noted at the L4-5 level.

## 2025-05-13 ENCOUNTER — OFFICE VISIT (OUTPATIENT)
Dept: NEUROLOGY | Facility: CLINIC | Age: 55
End: 2025-05-13
Payer: COMMERCIAL

## 2025-05-13 VITALS
HEART RATE: 71 BPM | TEMPERATURE: 97.9 F | BODY MASS INDEX: 32.23 KG/M2 | WEIGHT: 188.8 LBS | OXYGEN SATURATION: 95 % | HEIGHT: 64 IN | SYSTOLIC BLOOD PRESSURE: 140 MMHG | DIASTOLIC BLOOD PRESSURE: 80 MMHG | RESPIRATION RATE: 18 BRPM

## 2025-05-13 DIAGNOSIS — G44.209 TENSION HEADACHE: Primary | ICD-10-CM

## 2025-05-13 DIAGNOSIS — R25.3 MUSCLE TWITCHING: ICD-10-CM

## 2025-05-13 PROCEDURE — 99214 OFFICE O/P EST MOD 30 MIN: CPT | Performed by: PHYSICIAN ASSISTANT

## 2025-05-13 NOTE — PATIENT INSTRUCTIONS
Continue with baclofen and current supplements  Continue to stay well hydrated, eat regularly, getting good sleep  Proceed with sleep study as scheduled  Follow up in 1 year or sooner if needed

## 2025-05-13 NOTE — PROGRESS NOTES
Name: Dayanna Sneed      : 1970      MRN: [id number]  Encounter Provider: Nazia Burns PA-C  Encounter Date: 2025   Encounter department: [facility] [place]  :  Assessment & Plan  Tension headache  Headaches are currently well-controlled, getting a headache maybe once a week, not always weekly.  Reduction in stress has helped significantly.  She continues to take baclofen 20 mg daily, as well as magnesium and B2 supplements regularly.  She uses Excedrin at onset of headache if needed.    Will continue with current regimen and continue to monitor.    We reviewed lifestyle factors that contribute to headaches.  Advised staying well-hydrated, eating regularly/not skipping meals and getting adequate sleep.  She has upcoming sleep study.  We discussed not using OTC NSAIDs/Tylenol no more than 2-3 times a week to prevent rebound headaches.       Muscle twitching  Patient with intermittent muscle twitching in all of the extremities, usually worse when she is trying to fall asleep or relaxing her body.  EMG was normal with no evidence to support a generalized neurogenic or myopathic process.     No evidence of atrophy, fasciculations or weakness on exam.     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  Reassurance given that her neurologic workup is unremarkable for a more concerning neuromuscular disorder.     Encouraged the patient to stay hydrated, get proper rest, limit caffeine/stimulants.      This has been very stable, no worsening of symptoms, will continue to monitor.       Patient will follow-up in 1 year or sooner if needed.        History of Present Illness   HPI   Patient is a 54 year old right handed female who presents today for neurologic follow up.  She was last seen in 2024.      To review, she was initially evaluated by Dr. Early on 6/15/2020 for several issues.  Patient described that for the past 8-9 months she had been  experiencing muscle twitching. She described twitching proximally in any of the 4 extremities although occasionally will occur in the hands when she is working on the computer. She stated this was particularly apparent when resting.  She had no associated weakness or sensory symptoms. No symptoms to suggest a Lhermitte's phenomenon. No change in bowel or bladder function, speech or swallowing. In reviewing past studies, she was found to be vitamin-D deficient. She was put on a supplement.   Her father has Parkinson's disease. However, she is unaware of any family member with neuro muscular disease.  She also described headaches. These date back a long time were getting worse over the past 8-9 months as well. These are described as generally right frontoparietal in location.  She describes these predominantly as tightness, with rare modest pulsatile component. These have been occurring in the recent months on average of every other day and she does take Excedrin 2 tablets generally every other day, suggesting the possibility analgesic overuse effect as well.     Labs completed 8/13/2020 with normal CK, slightly low aldolase, slightly elevated TSH at 4.25 with normal FT4 1.12, normal magnesium, low vit D (29), normal folate, normal ionized calcium, essentially unremarkable CBC and CMP.    EMG of the LUE/LLE completed 10/14/2020 by Dr. Presley was a normal study with no evidence of a generalized neurogenic or myopathic process effecting the peripheral nervous system.       At timing of visit in Feb 2021 she reported some issues with vertigo.  She has a history of BPPV, but more recently had experienced some episodes of vertigo associated with head pressure that seemed to be brought on by stress.  This did not occur with head movement.  Also with some imbalance.  MRI brain with IACs w and wo contrast was completed 4/14/2021.  Normal appearance of the IACs.  Scattered FLAIR and T2 foci are seen most compatible with  chronic microangiopathic changes in patients of this age group.  In 2023 she reported occasionally getting a “whooshing” sound in her left ear, occurring on and off. Carotid US was completed 8/7/23 with no evidence of disease in R ICA and <50% stenosis L ICA.     Today, patient reports she is stable. The twitching is the same, no changes. She feels she stays well hydrated, is eating regularly, sleeps well at night.  I did refer her to sleep medicine at timing of last visit due to her snoring and daytime fatigue.  She recently had a consult and is scheduled for a sleep study in September.  She notes she was having a lot of fatigue after she had COVID in August 2024 and PCP ordered an echocardiogram which showed some abnormalities and she has upcoming visit with cardiology.  She has been trying to lose weight and is considering GLP-1 medications.  She was seeing physiatry and PT at [facility] for her low back pain, but now transitioned care to [facility]. She denies any vision changes, speech or swallowing difficulty, bowel or bladder changes. No recent bouts of vertigo.     Review of Systems   Constitutional:  Positive for fatigue. Negative for fever.   HENT: Negative.  Negative for hearing loss, tinnitus and trouble swallowing.    Eyes:  Negative for photophobia, pain and visual disturbance.   Respiratory: Negative.  Negative for cough and shortness of breath.    Cardiovascular: Negative.  Negative for chest pain and palpitations.   Gastrointestinal:  Negative for constipation, diarrhea, nausea and vomiting.   Endocrine: Negative.    Genitourinary: Negative.  Negative for difficulty urinating and urgency.   Musculoskeletal: Negative.  Negative for back pain, gait problem and neck pain.   Skin: Negative.  Negative for rash.   Neurological: Negative.  Negative for dizziness, tremors, seizures, syncope, speech difficulty, weakness, numbness and headaches.   Hematological: Negative.    Psychiatric/Behavioral:  Negative  "for decreased concentration and sleep disturbance. The patient is not nervous/anxious.     I have personally reviewed the MA's review of systems and made changes as necessary.    Current Outpatient Medications on File Prior to Visit   Medication Sig Dispense Refill    baclofen 10 mg tablet Take 2 tablets (20 mg total) by mouth daily 180 tablet 1    bisacodyl (DULCOLAX) 5 mg EC tablet Take 2 tablets (10 mg total) by mouth daily as needed for constipation for up to 1 dose 30 tablet 0    ergocalciferol (VITAMIN D2) 50,000 units Take 1 capsule (50,000 Units total) by mouth once a week 12 capsule 1    levothyroxine 100 mcg tablet Take 1 tablet (100 mcg total) by mouth daily 90 tablet 1    meclizine (ANTIVERT) 25 mg tablet Take 1 tablet (25 mg total) by mouth every 8 (eight) hours as needed for dizziness 90 tablet 1    Multiple Vitamins-Minerals (DAILY MULTI PO) Take by mouth      omeprazole (PriLOSEC) 20 mg delayed release capsule Take 1 capsule (20 mg total) by mouth daily 90 capsule 1    sertraline (ZOLOFT) 50 mg tablet Take 1 tablet (50 mg total) by mouth daily 90 tablet 1     No current facility-administered medications on file prior to visit.         Objective   /80 (BP Location: Left arm, Patient Position: Sitting, Cuff Size: Large)   Pulse 71   Temp 97.9 °F (36.6 °C) (Temporal)   Resp 18   Ht 5' 4\" (1.626 m)   Wt 85.6 kg (188 lb 12.8 oz)   LMP  (LMP Unknown)   SpO2 95%   BMI 32.41 kg/m²     Physical Exam  Constitutional:       Appearance: Normal appearance.   Eyes:      Extraocular Movements: EOM normal.      Pupils: Pupils are equal, round, and reactive to light.   Neurological:      Mental Status: She is alert.      Motor: Motor strength is normal.     Deep Tendon Reflexes: Reflexes are normal and symmetric.   Psychiatric:         Mood and Affect: Mood normal.         Speech: Speech normal.         Behavior: Behavior normal.       Neurological Exam  Mental Status  Alert. Oriented to person, place, " time and situation. Speech is normal. Language is fluent with no aphasia. Attention and concentration are normal.    Cranial Nerves  CN II: Visual fields full to confrontation.  CN III, IV, VI: Extraocular movements intact bilaterally. Pupils equal round and reactive to light bilaterally.  CN V: Facial sensation is normal.  CN VII: Full and symmetric facial movement.  CN VIII: Hearing is normal.  CN IX, X: Palate elevates symmetrically  CN XI: Shoulder shrug strength is normal.  CN XII: Tongue midline without atrophy or fasciculations.    Motor   Normal muscle tone. Strength is 5/5 throughout all four extremities.    Sensory  Light touch is normal in upper and lower extremities.     Reflexes  Deep tendon reflexes are 2+ and symmetric in all four extremities.    Coordination  Left: Finger-to-nose normal.    Gait  Casual gait is normal including stance, stride, and arm swing.

## 2025-05-13 NOTE — ASSESSMENT & PLAN NOTE
Patient with intermittent muscle twitching in all of the extremities, usually worse when she is trying to fall asleep or relaxing her body.  EMG was normal with no evidence to support a generalized neurogenic or myopathic process.     No evidence of atrophy, fasciculations or weakness on exam.     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  Reassurance given that her neurologic workup is unremarkable for a more concerning neuromuscular disorder.     Encouraged the patient to stay hydrated, get proper rest, limit caffeine/stimulants.      This has been very stable, no worsening of symptoms, will continue to monitor.

## 2025-05-13 NOTE — ASSESSMENT & PLAN NOTE
Headaches are currently well-controlled, getting a headache maybe once a week, not always weekly.  Reduction in stress has helped significantly.  She continues to take baclofen 20 mg daily, as well as magnesium and B2 supplements regularly.  She uses Excedrin at onset of headache if needed.    Will continue with current regimen and continue to monitor.    We reviewed lifestyle factors that contribute to headaches.  Advised staying well-hydrated, eating regularly/not skipping meals and getting adequate sleep.  She has upcoming sleep study.  We discussed not using OTC NSAIDs/Tylenol no more than 2-3 times a week to prevent rebound headaches.

## 2025-05-19 ENCOUNTER — OFFICE VISIT (OUTPATIENT)
Dept: CARDIOLOGY CLINIC | Facility: CLINIC | Age: 55
End: 2025-05-19
Attending: FAMILY MEDICINE
Payer: COMMERCIAL

## 2025-05-19 VITALS
HEIGHT: 65 IN | SYSTOLIC BLOOD PRESSURE: 122 MMHG | HEART RATE: 65 BPM | WEIGHT: 189.2 LBS | DIASTOLIC BLOOD PRESSURE: 78 MMHG | BODY MASS INDEX: 31.52 KG/M2

## 2025-05-19 DIAGNOSIS — R53.83 FATIGUE, UNSPECIFIED TYPE: ICD-10-CM

## 2025-05-19 DIAGNOSIS — R06.02 SHORTNESS OF BREATH: Primary | ICD-10-CM

## 2025-05-19 PROCEDURE — 99203 OFFICE O/P NEW LOW 30 MIN: CPT | Performed by: INTERNAL MEDICINE

## 2025-05-19 PROCEDURE — 93000 ELECTROCARDIOGRAM COMPLETE: CPT | Performed by: INTERNAL MEDICINE

## 2025-05-19 RX ORDER — METOPROLOL TARTRATE 50 MG
50 TABLET ORAL ONCE
Qty: 1 TABLET | Refills: 0 | Status: SHIPPED | OUTPATIENT
Start: 2025-05-19 | End: 2025-05-19

## 2025-05-19 NOTE — PROGRESS NOTES
[facility] [address] [address]  Phone#  [phone]  Fax#  [phone]  [facility] Card[facility] Office Consultation             NAME: Dayanna Sneed  AGE: 54 y.o. SEX: female   : 1970   MRN: [id number]    DATE: 2025  TIME: 8:52 AM    Cardiology Problem list:  No specialty comments available.      Assessment & Plan  Shortness of breath  Stable symptoms. Will check CCTA.    Orders:    POCT ECG    CTA Cardiac w FFR if needed; Future    metoprolol tartrate (LOPRESSOR) 50 mg tablet; Take 1 tablet (50 mg total) by mouth 1 (one) time for 1 dose    Fatigue, unspecified type                HPI:    Dayanna Sneed is a 54 y.o.-year-old female who presents to the cardiology clinic for initial consultation.    Dayanna had COVID last summer and she had persistent fatigue since then. She had an echocardiogram and then was referred for further evaluation.    She has fatigue and she has some shortness of breath with exertion. She has no chest pain and no palpitations.     Family Hx: Father had coronary artery disease. He had an MI at age 51.     Past history, family history, social history, current medications, vital signs, recent lab and imaging studies and  prior cardiology studies reviewed independently on this visit.    Allergies[1]  Current Medications[2]    Past Medical History:   Diagnosis Date    Disease of thyroid gland     Graves disease     Headache(784.0)     Herpes simplex infection     Migraine without aura and responsive to treatment     Postablative hypothyroidism     Varicella without complication      Past Surgical History:   Procedure Laterality Date    HYSTERECTOMY      age 38    MYOMECTOMY      NEUROPLASTY / TRANSPOSITION MEDIAN NERVE AT CARPAL TUNNEL Right     TOOTH EXTRACTION      UPPER GASTROINTESTINAL ENDOSCOPY       Family History   Problem Relation Age of Onset    Depression Mother     Migraines Mother     Osteopenia  Mother     Aortic aneurysm Father     Hyperlipidemia Father     Hypertension Father     Heart disease Father         ischemic    Thyroid disease Father     Stroke Father     Transient ischemic attack Father     Anxiety disorder Brother     Depression Brother     Lung cancer Maternal Uncle         70's    Skin cancer Maternal Uncle         70's    No Known Problems Paternal Aunt     Thyroid disease Paternal Uncle     Diabetes Maternal Grandmother     Stroke Maternal Grandmother     Skin cancer Maternal Grandfather         80's or 90's    Stroke Maternal Grandfather     Osteoporosis Paternal Grandmother     No Known Problems Paternal Grandfather     Anxiety disorder Son     ADD / ADHD Son     Thyroid disease Cousin      Social History   reports that she has never smoked. She has never used smokeless tobacco. She reports current alcohol use. She reports that she does not use drugs.     Review of Systems   Constitutional:  Positive for fatigue. Negative for chills and fever.   HENT:  Negative for ear pain and sore throat.    Eyes:  Negative for pain and visual disturbance.   Respiratory:  Positive for shortness of breath. Negative for cough.    Cardiovascular:  Negative for chest pain and palpitations.   Gastrointestinal:  Negative for abdominal pain and vomiting.   Genitourinary:  Negative for dysuria and hematuria.   Musculoskeletal:  Negative for arthralgias and back pain.   Skin:  Negative for color change and rash.   Neurological:  Negative for seizures and syncope.   All other systems reviewed and are negative.      Objective:     Vitals:    05/19/25 0840   BP: 122/78   Pulse: 65     Physical Exam  Vitals and nursing note reviewed.   Constitutional:       General: She is not in acute distress.     Appearance: She is well-developed.   HENT:      Head: Normocephalic and atraumatic.     Eyes:      Conjunctiva/sclera: Conjunctivae normal.       Cardiovascular:      Rate and Rhythm: Normal rate and regular rhythm.       Heart sounds: No murmur heard.  Pulmonary:      Effort: Pulmonary effort is normal. No respiratory distress.      Breath sounds: Normal breath sounds.   Abdominal:      Palpations: Abdomen is soft.      Tenderness: There is no abdominal tenderness.     Musculoskeletal:         General: No swelling.      Cervical back: Neck supple.     Skin:     General: Skin is warm and dry.      Capillary Refill: Capillary refill takes less than 2 seconds.     Neurological:      Mental Status: She is alert.     Psychiatric:         Mood and Affect: Mood normal.         Pertinent Laboratory/Diagnostic Studies:    Laboratory studies reviewed personally by Mauri Vasquez MD    BMP:   Lab Results   Component Value Date    SODIUM 140 10/23/2024    K 4.6 10/23/2024     10/23/2024    CO2 24 10/23/2024    BUN 15 10/23/2024    CREATININE 0.85 10/23/2024    GLUC 93 09/08/2018    CALCIUM 9.3 10/23/2024     CBC:  Lab Results   Component Value Date    WBC 5.41 10/23/2024    HGB 13.3 10/23/2024    HCT 41.9 10/23/2024    MCV 91 10/23/2024     10/23/2024     Lipid Profile:   Lab Results   Component Value Date    HDL 63 10/23/2024     Lab Results   Component Value Date    LDLCALC 180 (H) 10/23/2024     Lab Results   Component Value Date    TRIG 120 10/23/2024      Other labs:  Lab Results   Component Value Date    DDE0XDKAQSEX 3.270 12/27/2024    TSH 0.84 01/26/2019     Lab Results   Component Value Date    ALT 30 10/23/2024    AST 32 10/23/2024     Lab Results   Component Value Date    HGBA1C 5.3 01/13/2023       Imaging Studies:     Pertinent cardiac studies and imaging studies were personally reviewed on this visit and results summarized.    I have spent a total time of 45  minutes in caring for this patient on the day of the visit/encounter including reviewing and entering of medical history, physical examination, diagnostic results, instructions for management, patient education, risk factor reduction, documenting in the medical  "record, sending communications to other providers, reviewing/ordering tests, medicine, procedures etc.    Portions of the record may have been created with voice recognition software.  Occasional wrong word or \"sound alike\" substitutions may have occurred due to the inherent limitations of voice recognition software.  Read the chart carefully and recognize, using context, where substitutions have occurred. Please reach out to me directly for any clarifications.         [1] No Known Allergies  [2]   Current Outpatient Medications:     baclofen 10 mg tablet, Take 2 tablets (20 mg total) by mouth daily, Disp: 180 tablet, Rfl: 1    ergocalciferol (VITAMIN D2) 50,000 units, Take 1 capsule (50,000 Units total) by mouth once a week, Disp: 12 capsule, Rfl: 1    levothyroxine 100 mcg tablet, Take 1 tablet (100 mcg total) by mouth daily, Disp: 90 tablet, Rfl: 1    meclizine (ANTIVERT) 25 mg tablet, Take 1 tablet (25 mg total) by mouth every 8 (eight) hours as needed for dizziness, Disp: 90 tablet, Rfl: 1    Multiple Vitamins-Minerals (DAILY MULTI PO), Take by mouth, Disp: , Rfl:     omeprazole (PriLOSEC) 20 mg delayed release capsule, Take 1 capsule (20 mg total) by mouth daily, Disp: 90 capsule, Rfl: 1    sertraline (ZOLOFT) 50 mg tablet, Take 1 tablet (50 mg total) by mouth daily, Disp: 90 tablet, Rfl: 1    bisacodyl (DULCOLAX) 5 mg EC tablet, Take 2 tablets (10 mg total) by mouth daily as needed for constipation for up to 1 dose, Disp: 30 tablet, Rfl: 0    "

## 2025-05-23 ENCOUNTER — HOSPITAL ENCOUNTER (OUTPATIENT)
Dept: RADIOLOGY | Facility: MEDICAL CENTER | Age: 55
End: 2025-05-23
Attending: PHYSICAL MEDICINE & REHABILITATION
Payer: COMMERCIAL

## 2025-05-23 VITALS
HEART RATE: 56 BPM | SYSTOLIC BLOOD PRESSURE: 147 MMHG | DIASTOLIC BLOOD PRESSURE: 82 MMHG | RESPIRATION RATE: 20 BRPM | OXYGEN SATURATION: 97 % | TEMPERATURE: 98.2 F

## 2025-05-23 DIAGNOSIS — M47.816 LUMBAR SPONDYLOSIS: ICD-10-CM

## 2025-05-23 PROCEDURE — 64493 INJ PARAVERT F JNT L/S 1 LEV: CPT | Performed by: PHYSICAL MEDICINE & REHABILITATION

## 2025-05-23 PROCEDURE — 64494 INJ PARAVERT F JNT L/S 2 LEV: CPT | Performed by: PHYSICAL MEDICINE & REHABILITATION

## 2025-05-23 RX ADMIN — LIDOCAINE HYDROCHLORIDE 1.5 ML: 20 INJECTION, SOLUTION EPIDURAL; INFILTRATION; INTRACAUDAL; PERINEURAL at 13:38

## 2025-05-23 NOTE — H&P
History of Present Illness: The patient is a 54 y.o. female who presents with complaints of left low back pain    Past Medical History[1]    Past Surgical History[2]    Current Medications[3]    Allergies[4]    Physical Exam:   Vitals:    05/23/25 1326   BP: 146/83   Pulse: 59   Resp: 20   Temp: 98.2 °F (36.8 °C)   SpO2: 96%     General: Awake, Alert, Oriented x 3, Mood and affect appropriate  Respiratory: Respirations even and unlabored  Cardiovascular: Peripheral pulses intact; no edema  Musculoskeletal Exam: left low back pain    ASA Score: 3    Patient/Chart Verification  Patient ID Verified: Verbal  ID Band Applied: No  Consents Confirmed: To be obtained in the Procedural area  Interval H&P(within 24 hr) Complete (required for Outpatients and Surgery Admit only): To be obtained in the Procedural area  Allergies Reviewed: Yes  Anticoag/NSAID held?: NA  Currently on antibiotics?: No    Assessment:   1. Lumbar spondylosis        Plan: (L) L3-5 MBB#1  ]       [1]   Past Medical History:  Diagnosis Date    Disease of thyroid gland     Graves disease     Headache(784.0)     Herpes simplex infection     Migraine without aura and responsive to treatment     Postablative hypothyroidism     Varicella without complication    [2]   Past Surgical History:  Procedure Laterality Date    HYSTERECTOMY      age 38    MYOMECTOMY      NEUROPLASTY / TRANSPOSITION MEDIAN NERVE AT CARPAL TUNNEL Right     TOOTH EXTRACTION      UPPER GASTROINTESTINAL ENDOSCOPY     [3]   Current Outpatient Medications:     baclofen 10 mg tablet, Take 2 tablets (20 mg total) by mouth daily, Disp: 180 tablet, Rfl: 1    bisacodyl (DULCOLAX) 5 mg EC tablet, Take 2 tablets (10 mg total) by mouth daily as needed for constipation for up to 1 dose, Disp: 30 tablet, Rfl: 0    ergocalciferol (VITAMIN D2) 50,000 units, Take 1 capsule (50,000 Units total) by mouth once a week, Disp: 12 capsule, Rfl: 1    levothyroxine 100 mcg tablet, Take 1 tablet (100 mcg total)  by mouth daily, Disp: 90 tablet, Rfl: 1    meclizine (ANTIVERT) 25 mg tablet, Take 1 tablet (25 mg total) by mouth every 8 (eight) hours as needed for dizziness, Disp: 90 tablet, Rfl: 1    metoprolol tartrate (LOPRESSOR) 50 mg tablet, Take 1 tablet (50 mg total) by mouth 1 (one) time for 1 dose, Disp: 1 tablet, Rfl: 0    Multiple Vitamins-Minerals (DAILY MULTI PO), Take by mouth, Disp: , Rfl:     omeprazole (PriLOSEC) 20 mg delayed release capsule, Take 1 capsule (20 mg total) by mouth daily, Disp: 90 capsule, Rfl: 1    sertraline (ZOLOFT) 50 mg tablet, Take 1 tablet (50 mg total) by mouth daily, Disp: 90 tablet, Rfl: 1  [4] No Known Allergies

## 2025-05-23 NOTE — DISCHARGE INSTR - LAB

## 2025-05-27 ENCOUNTER — TELEPHONE (OUTPATIENT)
Dept: RADIOLOGY | Facility: MEDICAL CENTER | Age: 55
End: 2025-05-27

## 2025-05-27 NOTE — TELEPHONE ENCOUNTER
Procedure scheduled 6/10/25    Reviewed instructions: , NPO 1 hour prior, loose-fitting/comfortable clothes, if ill/fever/infx/abx to call and reschedule.  Also pain level at leat 5/10 and refrain from PRN, as-needed pain meds 6h prior.  Patient stated verbal understanding.

## 2025-05-29 ENCOUNTER — HOSPITAL ENCOUNTER (OUTPATIENT)
Dept: CT IMAGING | Facility: HOSPITAL | Age: 55
Discharge: HOME | End: 2025-05-29
Attending: INTERNAL MEDICINE
Payer: COMMERCIAL

## 2025-05-29 VITALS — DIASTOLIC BLOOD PRESSURE: 61 MMHG | HEART RATE: 61 BPM | SYSTOLIC BLOOD PRESSURE: 123 MMHG

## 2025-05-29 DIAGNOSIS — R06.02 SHORTNESS OF BREATH: ICD-10-CM

## 2025-05-29 PROCEDURE — 75574 CT ANGIO HRT W/3D IMAGE: CPT

## 2025-05-29 RX ORDER — NITROGLYCERIN 0.4 MG/1
0.8 TABLET SUBLINGUAL ONCE
Status: COMPLETED | OUTPATIENT
Start: 2025-05-29 | End: 2025-05-29

## 2025-05-29 RX ORDER — METOPROLOL TARTRATE 1 MG/ML
5 INJECTION, SOLUTION INTRAVENOUS
Status: DISCONTINUED | OUTPATIENT
Start: 2025-05-29 | End: 2025-05-30 | Stop reason: HOSPADM

## 2025-05-29 RX ADMIN — IOHEXOL 70 ML: 350 INJECTION, SOLUTION INTRAVENOUS at 12:16

## 2025-05-29 RX ADMIN — NITROGLYCERIN 0.8 MG: 0.4 TABLET SUBLINGUAL at 12:13

## 2025-05-29 NOTE — NURSING NOTE
Patient arrived for coronary CT angiography. Test education reviewed with patient. Medications and allergies verified. Pt denies PDE5 inhibitor use. Patient took lopressor as instructed by cardiology. SL nitro given per protocol. See vitals flowsheet. Tolerated test well. Instructed to increase fluid intake remainder of day. Vitals stable, patient asymptomatic upon departure with family.

## 2025-06-03 NOTE — TELEPHONE ENCOUNTER
Caller: pt    Doctor: Dr. nichols    Reason for call: pt needs to r/s her procedure.    Call back#: [phone]

## 2025-06-03 NOTE — TELEPHONE ENCOUNTER
Caller: Patient     Doctor/Office: Dr Erickson    Call regarding :  Calling to reschedule procedure      Call was transferred to: Procedure

## 2025-06-09 ENCOUNTER — RESULTS FOLLOW-UP (OUTPATIENT)
Dept: CARDIOLOGY CLINIC | Facility: CLINIC | Age: 55
End: 2025-06-09

## 2025-06-17 ENCOUNTER — HOSPITAL ENCOUNTER (OUTPATIENT)
Dept: RADIOLOGY | Facility: MEDICAL CENTER | Age: 55
Discharge: HOME | End: 2025-06-17
Attending: PHYSICAL MEDICINE & REHABILITATION
Payer: COMMERCIAL

## 2025-06-17 VITALS
OXYGEN SATURATION: 96 % | HEART RATE: 62 BPM | SYSTOLIC BLOOD PRESSURE: 148 MMHG | TEMPERATURE: 98.3 F | DIASTOLIC BLOOD PRESSURE: 80 MMHG | RESPIRATION RATE: 18 BRPM

## 2025-06-17 DIAGNOSIS — M47.816 LUMBAR SPONDYLOSIS: ICD-10-CM

## 2025-06-17 PROCEDURE — 64493 INJ PARAVERT F JNT L/S 1 LEV: CPT | Performed by: PHYSICAL MEDICINE & REHABILITATION

## 2025-06-17 PROCEDURE — 64494 INJ PARAVERT F JNT L/S 2 LEV: CPT | Performed by: PHYSICAL MEDICINE & REHABILITATION

## 2025-06-17 RX ORDER — BUPIVACAINE HYDROCHLORIDE 5 MG/ML
1.5 INJECTION, SOLUTION EPIDURAL; INTRACAUDAL; PERINEURAL ONCE
Status: COMPLETED | OUTPATIENT
Start: 2025-06-17 | End: 2025-06-17

## 2025-06-17 RX ADMIN — BUPIVACAINE HYDROCHLORIDE 1.5 ML: 5 INJECTION, SOLUTION EPIDURAL; INTRACAUDAL; PERINEURAL at 16:05

## 2025-06-17 NOTE — DISCHARGE INSTR - LAB

## 2025-06-17 NOTE — H&P
History of Present Illness: The patient is a 54 y.o. female who presents with complaints of left low back pain    Past Medical History[1]    Past Surgical History[2]    Current Medications[3]    Allergies[4]    Physical Exam:   Vitals:    06/17/25 1556   BP: 137/85   Pulse: 62   Resp: 18   Temp: 98.3 °F (36.8 °C)   SpO2: 98%     General: Awake, Alert, Oriented x 3, Mood and affect appropriate  Respiratory: Respirations even and unlabored  Cardiovascular: Peripheral pulses intact; no edema  Musculoskeletal Exam: left low back pain    ASA Score: 3    Patient/Chart Verification  Patient ID Verified: Verbal  ID Band Applied: No  Consents Confirmed: To be obtained in the Procedural area  Interval H&P(within 24 hr) Complete (required for Outpatients and Surgery Admit only): To be obtained in the Procedural area  Allergies Reviewed: Yes  Anticoag/NSAID held?: NA  Currently on antibiotics?: No  Does Patient Have a Prosthetic Device/Implant: No    Assessment:   1. Lumbar spondylosis        Plan: LT L3-5 MBB #2 (56387, 28259)         [1]   Past Medical History:  Diagnosis Date    Disease of thyroid gland     Graves disease     Headache(784.0)     Herpes simplex infection     Migraine without aura and responsive to treatment     Postablative hypothyroidism     Varicella without complication    [2]   Past Surgical History:  Procedure Laterality Date    HYSTERECTOMY      age 38    MYOMECTOMY      NEUROPLASTY / TRANSPOSITION MEDIAN NERVE AT CARPAL TUNNEL Right     TOOTH EXTRACTION      UPPER GASTROINTESTINAL ENDOSCOPY     [3]   Current Outpatient Medications:     baclofen 10 mg tablet, Take 2 tablets (20 mg total) by mouth daily, Disp: 180 tablet, Rfl: 1    bisacodyl (DULCOLAX) 5 mg EC tablet, Take 2 tablets (10 mg total) by mouth daily as needed for constipation for up to 1 dose, Disp: 30 tablet, Rfl: 0    ergocalciferol (VITAMIN D2) 50,000 units, Take 1 capsule (50,000 Units total) by mouth once a week, Disp: 12 capsule, Rfl:  1    levothyroxine 100 mcg tablet, Take 1 tablet (100 mcg total) by mouth daily, Disp: 90 tablet, Rfl: 1    meclizine (ANTIVERT) 25 mg tablet, Take 1 tablet (25 mg total) by mouth every 8 (eight) hours as needed for dizziness, Disp: 90 tablet, Rfl: 1    metoprolol tartrate (LOPRESSOR) 50 mg tablet, Take 1 tablet (50 mg total) by mouth 1 (one) time for 1 dose, Disp: 1 tablet, Rfl: 0    Multiple Vitamins-Minerals (DAILY MULTI PO), Take by mouth, Disp: , Rfl:     omeprazole (PriLOSEC) 20 mg delayed release capsule, Take 1 capsule (20 mg total) by mouth daily, Disp: 90 capsule, Rfl: 1    sertraline (ZOLOFT) 50 mg tablet, Take 1 tablet (50 mg total) by mouth daily, Disp: 90 tablet, Rfl: 1    Current Facility-Administered Medications:     bupivacaine (PF) (MARCAINE) 0.5 % injection 1.5 mL, 1.5 mL, Infiltration, Once, Kennedy Burgos DO  [4] No Known Allergies

## 2025-07-11 ENCOUNTER — HOSPITAL ENCOUNTER (OUTPATIENT)
Dept: RADIOLOGY | Facility: MEDICAL CENTER | Age: 55
Discharge: HOME | End: 2025-07-11
Attending: PHYSICAL MEDICINE & REHABILITATION
Payer: COMMERCIAL

## 2025-07-11 ENCOUNTER — TELEPHONE (OUTPATIENT)
Dept: PAIN MEDICINE | Facility: MEDICAL CENTER | Age: 55
End: 2025-07-11

## 2025-07-11 VITALS
DIASTOLIC BLOOD PRESSURE: 74 MMHG | OXYGEN SATURATION: 96 % | RESPIRATION RATE: 20 BRPM | TEMPERATURE: 98.3 F | SYSTOLIC BLOOD PRESSURE: 118 MMHG | HEART RATE: 63 BPM

## 2025-07-11 DIAGNOSIS — M54.16 LUMBAR RADICULOPATHY: ICD-10-CM

## 2025-07-11 PROCEDURE — 64635 DESTROY LUMB/SAC FACET JNT: CPT | Performed by: PHYSICAL MEDICINE & REHABILITATION

## 2025-07-11 PROCEDURE — 64636 DESTROY L/S FACET JNT ADDL: CPT | Performed by: PHYSICAL MEDICINE & REHABILITATION

## 2025-07-11 RX ORDER — TIRZEPATIDE 2.5 MG/.5ML
INJECTION, SOLUTION SUBCUTANEOUS
COMMUNITY
Start: 2025-06-25

## 2025-07-11 RX ADMIN — LIDOCAINE HYDROCHLORIDE 5 ML: 20 INJECTION, SOLUTION EPIDURAL; INFILTRATION; INTRACAUDAL; PERINEURAL at 11:11

## 2025-07-11 NOTE — DISCHARGE INSTR - LAB

## 2025-07-11 NOTE — H&P
History of Present Illness: The patient is a 54 y.o. female who presents with complaints of left low back pain    Past Medical History[1]    Past Surgical History[2]    Current Medications[3]    Allergies[4]    Physical Exam:   Vitals:    07/11/25 1053   BP: 125/77   Pulse: 64   Resp: 20   Temp: 98.3 °F (36.8 °C)   SpO2: 98%     General: Awake, Alert, Oriented x 3, Mood and affect appropriate  Respiratory: Respirations even and unlabored  Cardiovascular: Peripheral pulses intact; no edema  Musculoskeletal Exam: left low back pain    ASA Score: 3    Patient/Chart Verification  Patient ID Verified: Verbal  ID Band Applied: No  Consents Confirmed: To be obtained in the Procedural area  Interval H&P(within 24 hr) Complete (required for Outpatients and Surgery Admit only): To be obtained in the Procedural area  Allergies Reviewed: Yes  Anticoag/NSAID held?: NA  Currently on antibiotics?: No  Does Patient Have a Prosthetic Device/Implant: No (no pacemaker defibrilator)    Assessment:   1. Lumbar radiculopathy        Plan: Lt L3-5 RFA (80583, 84993)         [1]   Past Medical History:  Diagnosis Date    Disease of thyroid gland     Graves disease     Headache(784.0)     Herpes simplex infection     Migraine without aura and responsive to treatment     Postablative hypothyroidism     Varicella without complication    [2]   Past Surgical History:  Procedure Laterality Date    HYSTERECTOMY      age 38    MYOMECTOMY      NEUROPLASTY / TRANSPOSITION MEDIAN NERVE AT CARPAL TUNNEL Right     TOOTH EXTRACTION      UPPER GASTROINTESTINAL ENDOSCOPY     [3]   Current Outpatient Medications:     Tirzepatide-Weight Management (Zepbound) 2.5 mg/0.5 mL subcutaneous solution (vial), , Disp: , Rfl:     baclofen 10 mg tablet, Take 2 tablets (20 mg total) by mouth daily, Disp: 180 tablet, Rfl: 1    bisacodyl (DULCOLAX) 5 mg EC tablet, Take 2 tablets (10 mg total) by mouth daily as needed for constipation for up to 1 dose, Disp: 30 tablet,  Rfl: 0    ergocalciferol (VITAMIN D2) 50,000 units, Take 1 capsule (50,000 Units total) by mouth once a week, Disp: 12 capsule, Rfl: 1    levothyroxine 100 mcg tablet, Take 1 tablet (100 mcg total) by mouth daily, Disp: 90 tablet, Rfl: 1    meclizine (ANTIVERT) 25 mg tablet, Take 1 tablet (25 mg total) by mouth every 8 (eight) hours as needed for dizziness, Disp: 90 tablet, Rfl: 1    metoprolol tartrate (LOPRESSOR) 50 mg tablet, Take 1 tablet (50 mg total) by mouth 1 (one) time for 1 dose, Disp: 1 tablet, Rfl: 0    Multiple Vitamins-Minerals (DAILY MULTI PO), Take by mouth, Disp: , Rfl:     omeprazole (PriLOSEC) 20 mg delayed release capsule, Take 1 capsule (20 mg total) by mouth daily, Disp: 90 capsule, Rfl: 1    sertraline (ZOLOFT) 50 mg tablet, Take 1 tablet (50 mg total) by mouth daily, Disp: 90 tablet, Rfl: 1  [4] No Known Allergies

## 2025-07-24 DIAGNOSIS — R51.9 PERSISTENT HEADACHES: ICD-10-CM

## 2025-07-24 DIAGNOSIS — K21.9 GASTROESOPHAGEAL REFLUX DISEASE WITHOUT ESOPHAGITIS: ICD-10-CM

## 2025-07-24 RX ORDER — BACLOFEN 10 MG/1
20 TABLET ORAL DAILY
Qty: 180 TABLET | Refills: 1 | Status: SHIPPED | OUTPATIENT
Start: 2025-07-24

## 2025-07-25 RX ORDER — OMEPRAZOLE 20 MG/1
20 CAPSULE, DELAYED RELEASE ORAL DAILY
Qty: 90 CAPSULE | Refills: 1 | Status: SHIPPED | OUTPATIENT
Start: 2025-07-25

## 2025-07-29 DIAGNOSIS — E03.9 HYPOTHYROIDISM, UNSPECIFIED TYPE: ICD-10-CM

## 2025-07-30 RX ORDER — LEVOTHYROXINE SODIUM 100 UG/1
100 TABLET ORAL DAILY
Qty: 90 TABLET | Refills: 1 | Status: SHIPPED | OUTPATIENT
Start: 2025-07-30

## 2025-08-22 ENCOUNTER — OFFICE VISIT (OUTPATIENT)
Dept: PAIN MEDICINE | Facility: MEDICAL CENTER | Age: 55
End: 2025-08-22
Payer: COMMERCIAL

## 2025-08-22 VITALS — BODY MASS INDEX: 28.82 KG/M2 | HEIGHT: 65 IN | WEIGHT: 173 LBS

## 2025-08-22 DIAGNOSIS — G89.29 CHRONIC LEFT-SIDED LOW BACK PAIN WITHOUT SCIATICA: Primary | ICD-10-CM

## 2025-08-22 DIAGNOSIS — M54.50 CHRONIC LEFT-SIDED LOW BACK PAIN WITHOUT SCIATICA: Primary | ICD-10-CM

## 2025-08-22 DIAGNOSIS — M47.816 LUMBAR SPONDYLOSIS: ICD-10-CM

## 2025-08-22 DIAGNOSIS — M79.18 MYOFASCIAL PAIN SYNDROME: ICD-10-CM

## 2025-08-22 PROCEDURE — 99214 OFFICE O/P EST MOD 30 MIN: CPT
